# Patient Record
Sex: MALE | Race: WHITE | Employment: UNEMPLOYED | URBAN - METROPOLITAN AREA
[De-identification: names, ages, dates, MRNs, and addresses within clinical notes are randomized per-mention and may not be internally consistent; named-entity substitution may affect disease eponyms.]

---

## 2018-01-01 ENCOUNTER — APPOINTMENT (OUTPATIENT)
Dept: GENERAL RADIOLOGY | Age: 58
DRG: 870 | End: 2018-01-01
Attending: INTERNAL MEDICINE
Payer: MEDICARE

## 2018-01-01 ENCOUNTER — APPOINTMENT (OUTPATIENT)
Dept: GENERAL RADIOLOGY | Age: 58
DRG: 870 | End: 2018-01-01
Attending: HOSPITALIST
Payer: MEDICARE

## 2018-01-01 ENCOUNTER — APPOINTMENT (OUTPATIENT)
Dept: GENERAL RADIOLOGY | Age: 58
DRG: 870 | End: 2018-01-01
Attending: EMERGENCY MEDICINE
Payer: MEDICARE

## 2018-01-01 ENCOUNTER — HOSPITAL ENCOUNTER (INPATIENT)
Age: 58
LOS: 10 days | DRG: 870 | End: 2018-01-11
Attending: EMERGENCY MEDICINE | Admitting: INTERNAL MEDICINE
Payer: MEDICARE

## 2018-01-01 ENCOUNTER — APPOINTMENT (OUTPATIENT)
Dept: INTERVENTIONAL RADIOLOGY/VASCULAR | Age: 58
DRG: 870 | End: 2018-01-01
Attending: INTERNAL MEDICINE
Payer: MEDICARE

## 2018-01-01 VITALS
DIASTOLIC BLOOD PRESSURE: 57 MMHG | HEIGHT: 67 IN | WEIGHT: 224.87 LBS | TEMPERATURE: 97.6 F | BODY MASS INDEX: 35.29 KG/M2 | SYSTOLIC BLOOD PRESSURE: 99 MMHG
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
## 2018-01-01 DIAGNOSIS — R06.02 SOB (SHORTNESS OF BREATH): ICD-10-CM

## 2018-01-01 DIAGNOSIS — J18.9 HAP (HOSPITAL-ACQUIRED PNEUMONIA): ICD-10-CM

## 2018-01-01 DIAGNOSIS — J96.01 ACUTE RESPIRATORY FAILURE WITH HYPOXIA (HCC): Primary | ICD-10-CM

## 2018-01-01 DIAGNOSIS — R14.0 ABDOMINAL DISTENTION: ICD-10-CM

## 2018-01-01 DIAGNOSIS — N28.9 ACUTE RENAL INSUFFICIENCY: ICD-10-CM

## 2018-01-01 DIAGNOSIS — G93.40 ACUTE ENCEPHALOPATHY: ICD-10-CM

## 2018-01-01 DIAGNOSIS — I50.23 ACUTE ON CHRONIC SYSTOLIC HF (HEART FAILURE) (HCC): ICD-10-CM

## 2018-01-01 PROBLEM — J96.00 ACUTE RESPIRATORY FAILURE (HCC): Status: ACTIVE | Noted: 2018-01-01

## 2018-01-01 PROBLEM — I48.3 TYPICAL ATRIAL FLUTTER (HCC): Status: ACTIVE | Noted: 2018-01-01

## 2018-01-01 LAB
ALBUMIN SERPL-MCNC: 1.1 G/DL (ref 3.5–5)
ALBUMIN SERPL-MCNC: 1.2 G/DL (ref 3.5–5)
ALBUMIN SERPL-MCNC: 1.3 G/DL (ref 3.5–5)
ALBUMIN SERPL-MCNC: 1.4 G/DL (ref 3.5–5)
ALBUMIN SERPL-MCNC: 1.4 G/DL (ref 3.5–5)
ALBUMIN SERPL-MCNC: 1.5 G/DL (ref 3.5–5)
ALBUMIN SERPL-MCNC: 1.8 G/DL (ref 3.5–5)
ALBUMIN SERPL-MCNC: 2.1 G/DL (ref 3.5–5)
ALBUMIN SERPL-MCNC: 3.2 G/DL (ref 3.5–5)
ALBUMIN/GLOB SERPL: 0.3 {RATIO} (ref 1.1–2.2)
ALBUMIN/GLOB SERPL: 0.4 {RATIO} (ref 1.1–2.2)
ALBUMIN/GLOB SERPL: 0.5 {RATIO} (ref 1.1–2.2)
ALBUMIN/GLOB SERPL: 0.7 {RATIO} (ref 1.1–2.2)
ALP SERPL-CCNC: 114 U/L (ref 45–117)
ALP SERPL-CCNC: 43 U/L (ref 45–117)
ALP SERPL-CCNC: 50 U/L (ref 45–117)
ALP SERPL-CCNC: 56 U/L (ref 45–117)
ALP SERPL-CCNC: 59 U/L (ref 45–117)
ALP SERPL-CCNC: 69 U/L (ref 45–117)
ALP SERPL-CCNC: 70 U/L (ref 45–117)
ALP SERPL-CCNC: 88 U/L (ref 45–117)
ALP SERPL-CCNC: 94 U/L (ref 45–117)
ALP SERPL-CCNC: 99 U/L (ref 45–117)
ALT SERPL-CCNC: 25 U/L (ref 12–78)
ALT SERPL-CCNC: 25 U/L (ref 12–78)
ALT SERPL-CCNC: 29 U/L (ref 12–78)
ALT SERPL-CCNC: 29 U/L (ref 12–78)
ALT SERPL-CCNC: 34 U/L (ref 12–78)
ALT SERPL-CCNC: 40 U/L (ref 12–78)
ALT SERPL-CCNC: 41 U/L (ref 12–78)
ALT SERPL-CCNC: 44 U/L (ref 12–78)
ALT SERPL-CCNC: 48 U/L (ref 12–78)
ALT SERPL-CCNC: 62 U/L (ref 12–78)
AMORPH CRY URNS QL MICRO: ABNORMAL
ANION GAP SERPL CALC-SCNC: 10 MMOL/L (ref 5–15)
ANION GAP SERPL CALC-SCNC: 2 MMOL/L (ref 5–15)
ANION GAP SERPL CALC-SCNC: 3 MMOL/L (ref 5–15)
ANION GAP SERPL CALC-SCNC: 4 MMOL/L (ref 5–15)
ANION GAP SERPL CALC-SCNC: 5 MMOL/L (ref 5–15)
ANION GAP SERPL CALC-SCNC: 6 MMOL/L (ref 5–15)
ANION GAP SERPL CALC-SCNC: 7 MMOL/L (ref 5–15)
ANION GAP SERPL CALC-SCNC: 8 MMOL/L (ref 5–15)
ANION GAP SERPL CALC-SCNC: 9 MMOL/L (ref 5–15)
APPEARANCE UR: ABNORMAL
APTT PPP: 37.6 SEC (ref 22.1–32.5)
APTT PPP: 50.2 SEC (ref 22.1–32.5)
APTT PPP: 56 SEC (ref 22.1–32.5)
APTT PPP: 57.1 SEC (ref 22.1–32.5)
APTT PPP: 58.3 SEC (ref 22.1–32.5)
APTT PPP: 59.2 SEC (ref 22.1–32.5)
APTT PPP: 59.6 SEC (ref 22.1–32.5)
APTT PPP: 61.6 SEC (ref 22.1–32.5)
APTT PPP: 62.2 SEC (ref 22.1–32.5)
APTT PPP: 63 SEC (ref 22.1–32.5)
APTT PPP: >130 SEC (ref 22.1–32.5)
ARTERIAL PATENCY WRIST A: ABNORMAL
ARTERIAL PATENCY WRIST A: YES
AST SERPL-CCNC: 35 U/L (ref 15–37)
AST SERPL-CCNC: 40 U/L (ref 15–37)
AST SERPL-CCNC: 50 U/L (ref 15–37)
AST SERPL-CCNC: 52 U/L (ref 15–37)
AST SERPL-CCNC: 56 U/L (ref 15–37)
AST SERPL-CCNC: 60 U/L (ref 15–37)
AST SERPL-CCNC: 71 U/L (ref 15–37)
AST SERPL-CCNC: 92 U/L (ref 15–37)
ATRIAL RATE: 120 BPM
ATRIAL RATE: 150 BPM
ATRIAL RATE: 311 BPM
ATRIAL RATE: 96 BPM
BACTERIA SPEC CULT: ABNORMAL
BACTERIA SPEC CULT: ABNORMAL
BACTERIA SPEC CULT: NORMAL
BACTERIA SPEC CULT: NORMAL
BACTERIA URNS QL MICRO: NEGATIVE /HPF
BASE EXCESS BLDA CALC-SCNC: 1 MMOL/L
BASE EXCESS BLDA CALC-SCNC: 1 MMOL/L
BASE EXCESS BLDA CALC-SCNC: 1.5 MMOL/L
BASE EXCESS BLDA CALC-SCNC: 1.8 MMOL/L
BASE EXCESS BLDA CALC-SCNC: 10 MMOL/L
BASE EXCESS BLDA CALC-SCNC: 10 MMOL/L
BASE EXCESS BLDA CALC-SCNC: 11.9 MMOL/L
BASE EXCESS BLDA CALC-SCNC: 3.3 MMOL/L
BASE EXCESS BLDA CALC-SCNC: 3.3 MMOL/L
BASE EXCESS BLDA CALC-SCNC: 3.6 MMOL/L
BASE EXCESS BLDA CALC-SCNC: 3.6 MMOL/L
BASE EXCESS BLDA CALC-SCNC: 5.5 MMOL/L
BASE EXCESS BLDA CALC-SCNC: 6.2 MMOL/L
BASE EXCESS BLDA CALC-SCNC: 6.2 MMOL/L
BASE EXCESS BLDA CALC-SCNC: 7.7 MMOL/L
BASE EXCESS BLDA CALC-SCNC: 8 MMOL/L
BASOPHILS # BLD: 0 K/UL
BASOPHILS # BLD: 0 K/UL (ref 0–0.1)
BASOPHILS NFR BLD: 0 %
BASOPHILS NFR BLD: 0 % (ref 0–1)
BDY SITE: ABNORMAL
BILIRUB DIRECT SERPL-MCNC: 0.1 MG/DL (ref 0–0.2)
BILIRUB SERPL-MCNC: 0.4 MG/DL (ref 0.2–1)
BILIRUB SERPL-MCNC: 0.5 MG/DL (ref 0.2–1)
BILIRUB SERPL-MCNC: 0.9 MG/DL (ref 0.2–1)
BILIRUB UR QL: NEGATIVE
BNP SERPL-MCNC: 4443 PG/ML (ref 0–125)
BREATHS.SPONTANEOUS ON VENT: 30
BREATHS.SPONTANEOUS ON VENT: 30
BREATHS.SPONTANEOUS ON VENT: 32
BUN SERPL-MCNC: 100 MG/DL (ref 6–20)
BUN SERPL-MCNC: 105 MG/DL (ref 6–20)
BUN SERPL-MCNC: 123 MG/DL (ref 6–20)
BUN SERPL-MCNC: 124 MG/DL (ref 6–20)
BUN SERPL-MCNC: 22 MG/DL (ref 6–20)
BUN SERPL-MCNC: 43 MG/DL (ref 6–20)
BUN SERPL-MCNC: 43 MG/DL (ref 6–20)
BUN SERPL-MCNC: 47 MG/DL (ref 6–20)
BUN SERPL-MCNC: 49 MG/DL (ref 6–20)
BUN SERPL-MCNC: 52 MG/DL (ref 6–20)
BUN SERPL-MCNC: 54 MG/DL (ref 6–20)
BUN SERPL-MCNC: 55 MG/DL (ref 6–20)
BUN SERPL-MCNC: 64 MG/DL (ref 6–20)
BUN SERPL-MCNC: 72 MG/DL (ref 6–20)
BUN SERPL-MCNC: 75 MG/DL (ref 6–20)
BUN SERPL-MCNC: 82 MG/DL (ref 6–20)
BUN SERPL-MCNC: 89 MG/DL (ref 6–20)
BUN/CREAT SERPL: 21 (ref 12–20)
BUN/CREAT SERPL: 23 (ref 12–20)
BUN/CREAT SERPL: 25 (ref 12–20)
BUN/CREAT SERPL: 26 (ref 12–20)
BUN/CREAT SERPL: 27 (ref 12–20)
BUN/CREAT SERPL: 27 (ref 12–20)
BUN/CREAT SERPL: 28 (ref 12–20)
BUN/CREAT SERPL: 29 (ref 12–20)
BUN/CREAT SERPL: 29 (ref 12–20)
BUN/CREAT SERPL: 30 (ref 12–20)
BUN/CREAT SERPL: 36 (ref 12–20)
CALCIUM SERPL-MCNC: 7.4 MG/DL (ref 8.5–10.1)
CALCIUM SERPL-MCNC: 7.4 MG/DL (ref 8.5–10.1)
CALCIUM SERPL-MCNC: 7.7 MG/DL (ref 8.5–10.1)
CALCIUM SERPL-MCNC: 7.8 MG/DL (ref 8.5–10.1)
CALCIUM SERPL-MCNC: 7.9 MG/DL (ref 8.5–10.1)
CALCIUM SERPL-MCNC: 8 MG/DL (ref 8.5–10.1)
CALCIUM SERPL-MCNC: 8.1 MG/DL (ref 8.5–10.1)
CALCIUM SERPL-MCNC: 8.1 MG/DL (ref 8.5–10.1)
CALCIUM SERPL-MCNC: 8.2 MG/DL (ref 8.5–10.1)
CALCIUM SERPL-MCNC: 8.3 MG/DL (ref 8.5–10.1)
CALCIUM SERPL-MCNC: 8.4 MG/DL (ref 8.5–10.1)
CALCIUM SERPL-MCNC: 8.8 MG/DL (ref 8.5–10.1)
CALCULATED P AXIS, ECG09: 115 DEGREES
CALCULATED P AXIS, ECG09: 51 DEGREES
CALCULATED R AXIS, ECG10: 113 DEGREES
CALCULATED R AXIS, ECG10: 113 DEGREES
CALCULATED R AXIS, ECG10: 119 DEGREES
CALCULATED R AXIS, ECG10: 132 DEGREES
CALCULATED T AXIS, ECG11: -22 DEGREES
CALCULATED T AXIS, ECG11: -66 DEGREES
CALCULATED T AXIS, ECG11: -76 DEGREES
CALCULATED T AXIS, ECG11: -78 DEGREES
CHLORIDE SERPL-SCNC: 100 MMOL/L (ref 97–108)
CHLORIDE SERPL-SCNC: 93 MMOL/L (ref 97–108)
CHLORIDE SERPL-SCNC: 93 MMOL/L (ref 97–108)
CHLORIDE SERPL-SCNC: 94 MMOL/L (ref 97–108)
CHLORIDE SERPL-SCNC: 95 MMOL/L (ref 97–108)
CHLORIDE SERPL-SCNC: 95 MMOL/L (ref 97–108)
CHLORIDE SERPL-SCNC: 96 MMOL/L (ref 97–108)
CHLORIDE SERPL-SCNC: 97 MMOL/L (ref 97–108)
CHLORIDE SERPL-SCNC: 98 MMOL/L (ref 97–108)
CHLORIDE SERPL-SCNC: 99 MMOL/L (ref 97–108)
CK MB CFR SERPL CALC: 4.1 % (ref 0–2.5)
CK MB SERPL-MCNC: 1.4 NG/ML (ref 5–25)
CK SERPL-CCNC: 34 U/L (ref 39–308)
CK SERPL-CCNC: 40 U/L (ref 39–308)
CO2 SERPL-SCNC: 30 MMOL/L (ref 21–32)
CO2 SERPL-SCNC: 31 MMOL/L (ref 21–32)
CO2 SERPL-SCNC: 31 MMOL/L (ref 21–32)
CO2 SERPL-SCNC: 32 MMOL/L (ref 21–32)
CO2 SERPL-SCNC: 33 MMOL/L (ref 21–32)
CO2 SERPL-SCNC: 34 MMOL/L (ref 21–32)
CO2 SERPL-SCNC: 35 MMOL/L (ref 21–32)
CO2 SERPL-SCNC: 37 MMOL/L (ref 21–32)
CO2 SERPL-SCNC: 42 MMOL/L (ref 21–32)
COLOR UR: ABNORMAL
CREAT SERPL-MCNC: 1.07 MG/DL (ref 0.7–1.3)
CREAT SERPL-MCNC: 1.3 MG/DL (ref 0.7–1.3)
CREAT SERPL-MCNC: 1.42 MG/DL (ref 0.7–1.3)
CREAT SERPL-MCNC: 1.82 MG/DL (ref 0.7–1.3)
CREAT SERPL-MCNC: 1.89 MG/DL (ref 0.7–1.3)
CREAT SERPL-MCNC: 1.9 MG/DL (ref 0.7–1.3)
CREAT SERPL-MCNC: 2.14 MG/DL (ref 0.7–1.3)
CREAT SERPL-MCNC: 2.17 MG/DL (ref 0.7–1.3)
CREAT SERPL-MCNC: 2.44 MG/DL (ref 0.7–1.3)
CREAT SERPL-MCNC: 2.62 MG/DL (ref 0.7–1.3)
CREAT SERPL-MCNC: 2.72 MG/DL (ref 0.7–1.3)
CREAT SERPL-MCNC: 2.99 MG/DL (ref 0.7–1.3)
CREAT SERPL-MCNC: 3.56 MG/DL (ref 0.7–1.3)
CREAT SERPL-MCNC: 3.75 MG/DL (ref 0.7–1.3)
CREAT SERPL-MCNC: 3.8 MG/DL (ref 0.7–1.3)
CREAT SERPL-MCNC: 4.76 MG/DL (ref 0.7–1.3)
CREAT SERPL-MCNC: 4.81 MG/DL (ref 0.7–1.3)
DATE LAST DOSE: ABNORMAL
DIAGNOSIS, 93000: NORMAL
DIFFERENTIAL METHOD BLD: ABNORMAL
EOSINOPHIL # BLD: 0 K/UL
EOSINOPHIL # BLD: 0 K/UL (ref 0–0.4)
EOSINOPHIL # BLD: 0.2 K/UL
EOSINOPHIL NFR BLD: 0 %
EOSINOPHIL NFR BLD: 0 % (ref 0–7)
EOSINOPHIL NFR BLD: 1 %
EPAP/CPAP/PEEP, PAPEEP: 12
EPAP/CPAP/PEEP, PAPEEP: 12
EPAP/CPAP/PEEP, PAPEEP: 14
EPAP/CPAP/PEEP, PAPEEP: 18
EPAP/CPAP/PEEP, PAPEEP: 5
EPAP/CPAP/PEEP, PAPEEP: 6
EPAP/CPAP/PEEP, PAPEEP: 8
EPITH CASTS URNS QL MICRO: ABNORMAL /LPF
ERYTHROCYTE [DISTWIDTH] IN BLOOD BY AUTOMATED COUNT: 14.4 % (ref 11.5–14.5)
ERYTHROCYTE [DISTWIDTH] IN BLOOD BY AUTOMATED COUNT: 15 % (ref 11.5–14.5)
ERYTHROCYTE [DISTWIDTH] IN BLOOD BY AUTOMATED COUNT: 15.3 % (ref 11.5–14.5)
ERYTHROCYTE [DISTWIDTH] IN BLOOD BY AUTOMATED COUNT: 15.5 % (ref 11.5–14.5)
ERYTHROCYTE [DISTWIDTH] IN BLOOD BY AUTOMATED COUNT: 15.6 % (ref 11.5–14.5)
ERYTHROCYTE [DISTWIDTH] IN BLOOD BY AUTOMATED COUNT: 15.6 % (ref 11.5–14.5)
ERYTHROCYTE [DISTWIDTH] IN BLOOD BY AUTOMATED COUNT: 15.8 % (ref 11.5–14.5)
ERYTHROCYTE [DISTWIDTH] IN BLOOD BY AUTOMATED COUNT: 16 % (ref 11.5–14.5)
EST. AVERAGE GLUCOSE BLD GHB EST-MCNC: 212 MG/DL
FIBRINOGEN PPP-MCNC: 633 MG/DL (ref 200–475)
FIO2 ON VENT: 100 %
FIO2 ON VENT: 90 %
FIO2 ON VENT: 90 %
GAS FLOW.O2 O2 DELIVERY SYS: 60 L/MIN
GAS FLOW.O2 SETTING OXYMISER: 16 L/MIN
GAS FLOW.O2 SETTING OXYMISER: 24 L/MIN
GAS FLOW.O2 SETTING OXYMISER: 30 L/MIN
GAS FLOW.O2 SETTING OXYMISER: 4 L/MIN
GLOBULIN SER CALC-MCNC: 3.2 G/DL (ref 2–4)
GLOBULIN SER CALC-MCNC: 3.4 G/DL (ref 2–4)
GLOBULIN SER CALC-MCNC: 3.5 G/DL (ref 2–4)
GLOBULIN SER CALC-MCNC: 3.6 G/DL (ref 2–4)
GLOBULIN SER CALC-MCNC: 3.6 G/DL (ref 2–4)
GLOBULIN SER CALC-MCNC: 3.8 G/DL (ref 2–4)
GLOBULIN SER CALC-MCNC: 3.8 G/DL (ref 2–4)
GLOBULIN SER CALC-MCNC: 3.9 G/DL (ref 2–4)
GLOBULIN SER CALC-MCNC: 4 G/DL (ref 2–4)
GLOBULIN SER CALC-MCNC: 4.7 G/DL (ref 2–4)
GLUCOSE BLD STRIP.AUTO-MCNC: 100 MG/DL (ref 65–100)
GLUCOSE BLD STRIP.AUTO-MCNC: 100 MG/DL (ref 65–100)
GLUCOSE BLD STRIP.AUTO-MCNC: 103 MG/DL (ref 65–100)
GLUCOSE BLD STRIP.AUTO-MCNC: 118 MG/DL (ref 65–100)
GLUCOSE BLD STRIP.AUTO-MCNC: 122 MG/DL (ref 65–100)
GLUCOSE BLD STRIP.AUTO-MCNC: 133 MG/DL (ref 65–100)
GLUCOSE BLD STRIP.AUTO-MCNC: 134 MG/DL (ref 65–100)
GLUCOSE BLD STRIP.AUTO-MCNC: 139 MG/DL (ref 65–100)
GLUCOSE BLD STRIP.AUTO-MCNC: 141 MG/DL (ref 65–100)
GLUCOSE BLD STRIP.AUTO-MCNC: 142 MG/DL (ref 65–100)
GLUCOSE BLD STRIP.AUTO-MCNC: 147 MG/DL (ref 65–100)
GLUCOSE BLD STRIP.AUTO-MCNC: 152 MG/DL (ref 65–100)
GLUCOSE BLD STRIP.AUTO-MCNC: 152 MG/DL (ref 65–100)
GLUCOSE BLD STRIP.AUTO-MCNC: 153 MG/DL (ref 65–100)
GLUCOSE BLD STRIP.AUTO-MCNC: 161 MG/DL (ref 65–100)
GLUCOSE BLD STRIP.AUTO-MCNC: 176 MG/DL (ref 65–100)
GLUCOSE BLD STRIP.AUTO-MCNC: 179 MG/DL (ref 65–100)
GLUCOSE BLD STRIP.AUTO-MCNC: 195 MG/DL (ref 65–100)
GLUCOSE BLD STRIP.AUTO-MCNC: 195 MG/DL (ref 65–100)
GLUCOSE BLD STRIP.AUTO-MCNC: 198 MG/DL (ref 65–100)
GLUCOSE BLD STRIP.AUTO-MCNC: 198 MG/DL (ref 65–100)
GLUCOSE BLD STRIP.AUTO-MCNC: 217 MG/DL (ref 65–100)
GLUCOSE BLD STRIP.AUTO-MCNC: 220 MG/DL (ref 65–100)
GLUCOSE BLD STRIP.AUTO-MCNC: 231 MG/DL (ref 65–100)
GLUCOSE BLD STRIP.AUTO-MCNC: 233 MG/DL (ref 65–100)
GLUCOSE BLD STRIP.AUTO-MCNC: 237 MG/DL (ref 65–100)
GLUCOSE BLD STRIP.AUTO-MCNC: 238 MG/DL (ref 65–100)
GLUCOSE BLD STRIP.AUTO-MCNC: 247 MG/DL (ref 65–100)
GLUCOSE BLD STRIP.AUTO-MCNC: 247 MG/DL (ref 65–100)
GLUCOSE BLD STRIP.AUTO-MCNC: 252 MG/DL (ref 65–100)
GLUCOSE BLD STRIP.AUTO-MCNC: 262 MG/DL (ref 65–100)
GLUCOSE BLD STRIP.AUTO-MCNC: 270 MG/DL (ref 65–100)
GLUCOSE BLD STRIP.AUTO-MCNC: 278 MG/DL (ref 65–100)
GLUCOSE BLD STRIP.AUTO-MCNC: 285 MG/DL (ref 65–100)
GLUCOSE BLD STRIP.AUTO-MCNC: 294 MG/DL (ref 65–100)
GLUCOSE BLD STRIP.AUTO-MCNC: 298 MG/DL (ref 65–100)
GLUCOSE BLD STRIP.AUTO-MCNC: 308 MG/DL (ref 65–100)
GLUCOSE BLD STRIP.AUTO-MCNC: 312 MG/DL (ref 65–100)
GLUCOSE BLD STRIP.AUTO-MCNC: 35 MG/DL (ref 65–100)
GLUCOSE BLD STRIP.AUTO-MCNC: 36 MG/DL (ref 65–100)
GLUCOSE BLD STRIP.AUTO-MCNC: 47 MG/DL (ref 65–100)
GLUCOSE BLD STRIP.AUTO-MCNC: 49 MG/DL (ref 65–100)
GLUCOSE BLD STRIP.AUTO-MCNC: 78 MG/DL (ref 65–100)
GLUCOSE BLD STRIP.AUTO-MCNC: 83 MG/DL (ref 65–100)
GLUCOSE BLD STRIP.AUTO-MCNC: 86 MG/DL (ref 65–100)
GLUCOSE BLD STRIP.AUTO-MCNC: 89 MG/DL (ref 65–100)
GLUCOSE BLD STRIP.AUTO-MCNC: 97 MG/DL (ref 65–100)
GLUCOSE SERPL-MCNC: 102 MG/DL (ref 65–100)
GLUCOSE SERPL-MCNC: 138 MG/DL (ref 65–100)
GLUCOSE SERPL-MCNC: 142 MG/DL (ref 65–100)
GLUCOSE SERPL-MCNC: 165 MG/DL (ref 65–100)
GLUCOSE SERPL-MCNC: 187 MG/DL (ref 65–100)
GLUCOSE SERPL-MCNC: 189 MG/DL (ref 65–100)
GLUCOSE SERPL-MCNC: 190 MG/DL (ref 65–100)
GLUCOSE SERPL-MCNC: 192 MG/DL (ref 65–100)
GLUCOSE SERPL-MCNC: 238 MG/DL (ref 65–100)
GLUCOSE SERPL-MCNC: 243 MG/DL (ref 65–100)
GLUCOSE SERPL-MCNC: 251 MG/DL (ref 65–100)
GLUCOSE SERPL-MCNC: 259 MG/DL (ref 65–100)
GLUCOSE SERPL-MCNC: 269 MG/DL (ref 65–100)
GLUCOSE SERPL-MCNC: 278 MG/DL (ref 65–100)
GLUCOSE SERPL-MCNC: 290 MG/DL (ref 65–100)
GLUCOSE SERPL-MCNC: 331 MG/DL (ref 65–100)
GLUCOSE SERPL-MCNC: 83 MG/DL (ref 65–100)
GLUCOSE UR STRIP.AUTO-MCNC: NEGATIVE MG/DL
GRAM STN SPEC: ABNORMAL
GRAM STN SPEC: ABNORMAL
GRAM STN SPEC: NORMAL
HBA1C MFR BLD: 9 % (ref 4.2–6.3)
HBV SURFACE AB SER QL: NONREACTIVE
HBV SURFACE AB SER-ACNC: 6.65 MIU/ML
HBV SURFACE AG SER QL: <0.1 INDEX
HBV SURFACE AG SER QL: NEGATIVE
HCO3 BLDA-SCNC: 31 MMOL/L (ref 22–26)
HCO3 BLDA-SCNC: 31 MMOL/L (ref 22–26)
HCO3 BLDA-SCNC: 32 MMOL/L (ref 22–26)
HCO3 BLDA-SCNC: 34 MMOL/L (ref 22–26)
HCO3 BLDA-SCNC: 35 MMOL/L (ref 22–26)
HCO3 BLDA-SCNC: 36 MMOL/L (ref 22–26)
HCO3 BLDA-SCNC: 37 MMOL/L (ref 22–26)
HCO3 BLDA-SCNC: 38 MMOL/L (ref 22–26)
HCO3 BLDA-SCNC: 38 MMOL/L (ref 22–26)
HCO3 BLDA-SCNC: 39 MMOL/L (ref 22–26)
HCO3 BLDA-SCNC: 44 MMOL/L (ref 22–26)
HCO3 BLDA-SCNC: 45 MMOL/L (ref 22–26)
HCT VFR BLD AUTO: 36.2 % (ref 36.6–50.3)
HCT VFR BLD AUTO: 39.6 % (ref 36.6–50.3)
HCT VFR BLD AUTO: 39.8 % (ref 36.6–50.3)
HCT VFR BLD AUTO: 40.8 % (ref 36.6–50.3)
HCT VFR BLD AUTO: 41.4 % (ref 36.6–50.3)
HCT VFR BLD AUTO: 43 % (ref 36.6–50.3)
HCT VFR BLD AUTO: 43.2 % (ref 36.6–50.3)
HCT VFR BLD AUTO: 43.5 % (ref 36.6–50.3)
HCT VFR BLD AUTO: 48.6 % (ref 36.6–50.3)
HCT VFR BLD AUTO: 55 % (ref 36.6–50.3)
HGB BLD-MCNC: 11.6 G/DL (ref 12.1–17)
HGB BLD-MCNC: 12.5 G/DL (ref 12.1–17)
HGB BLD-MCNC: 12.6 G/DL (ref 12.1–17)
HGB BLD-MCNC: 12.7 G/DL (ref 12.1–17)
HGB BLD-MCNC: 13.3 G/DL (ref 12.1–17)
HGB BLD-MCNC: 13.7 G/DL (ref 12.1–17)
HGB BLD-MCNC: 14 G/DL (ref 12.1–17)
HGB BLD-MCNC: 14.1 G/DL (ref 12.1–17)
HGB BLD-MCNC: 14.9 G/DL (ref 12.1–17)
HGB BLD-MCNC: 18.4 G/DL (ref 12.1–17)
HGB UR QL STRIP: ABNORMAL
INR PPP: 1.2 (ref 0.9–1.1)
IPAP/PIP, IPAPIP: 14
IPAP/PIP, IPAPIP: 16
IPAP/PIP, IPAPIP: 25
IPAP/PIP, IPAPIP: 26
IPAP/PIP, IPAPIP: 34
KETONES UR QL STRIP.AUTO: NEGATIVE MG/DL
KOH PREP SPEC: NORMAL
LACTATE SERPL-SCNC: 0.8 MMOL/L (ref 0.4–2)
LACTATE SERPL-SCNC: 2.5 MMOL/L (ref 0.4–2)
LEUKOCYTE ESTERASE UR QL STRIP.AUTO: ABNORMAL
LYMPHOCYTES # BLD: 0 K/UL
LYMPHOCYTES # BLD: 0.2 K/UL (ref 0.8–3.5)
LYMPHOCYTES # BLD: 0.3 K/UL
LYMPHOCYTES # BLD: 0.3 K/UL (ref 0.8–3.5)
LYMPHOCYTES # BLD: 0.5 K/UL (ref 0.8–3.5)
LYMPHOCYTES # BLD: 1.3 K/UL
LYMPHOCYTES # BLD: 1.6 K/UL
LYMPHOCYTES NFR BLD: 0 %
LYMPHOCYTES NFR BLD: 1 %
LYMPHOCYTES NFR BLD: 1 % (ref 12–49)
LYMPHOCYTES NFR BLD: 1 % (ref 12–49)
LYMPHOCYTES NFR BLD: 2 % (ref 12–49)
LYMPHOCYTES NFR BLD: 4 %
LYMPHOCYTES NFR BLD: 5 %
MAGNESIUM SERPL-MCNC: 2 MG/DL (ref 1.6–2.4)
MAGNESIUM SERPL-MCNC: 2.1 MG/DL (ref 1.6–2.4)
MAGNESIUM SERPL-MCNC: 2.3 MG/DL (ref 1.6–2.4)
MAGNESIUM SERPL-MCNC: 2.3 MG/DL (ref 1.6–2.4)
MAGNESIUM SERPL-MCNC: 2.4 MG/DL (ref 1.6–2.4)
MAGNESIUM SERPL-MCNC: 2.5 MG/DL (ref 1.6–2.4)
MAGNESIUM SERPL-MCNC: 2.5 MG/DL (ref 1.6–2.4)
MCH RBC QN AUTO: 28.7 PG (ref 26–34)
MCH RBC QN AUTO: 28.9 PG (ref 26–34)
MCH RBC QN AUTO: 28.9 PG (ref 26–34)
MCH RBC QN AUTO: 29.3 PG (ref 26–34)
MCH RBC QN AUTO: 29.4 PG (ref 26–34)
MCH RBC QN AUTO: 29.4 PG (ref 26–34)
MCH RBC QN AUTO: 29.5 PG (ref 26–34)
MCH RBC QN AUTO: 29.9 PG (ref 26–34)
MCH RBC QN AUTO: 30.4 PG (ref 26–34)
MCH RBC QN AUTO: 31.7 PG (ref 26–34)
MCHC RBC AUTO-ENTMCNC: 30.7 G/DL (ref 30–36.5)
MCHC RBC AUTO-ENTMCNC: 30.9 G/DL (ref 30–36.5)
MCHC RBC AUTO-ENTMCNC: 30.9 G/DL (ref 30–36.5)
MCHC RBC AUTO-ENTMCNC: 31.6 G/DL (ref 30–36.5)
MCHC RBC AUTO-ENTMCNC: 31.9 G/DL (ref 30–36.5)
MCHC RBC AUTO-ENTMCNC: 32 G/DL (ref 30–36.5)
MCHC RBC AUTO-ENTMCNC: 32.2 G/DL (ref 30–36.5)
MCHC RBC AUTO-ENTMCNC: 32.6 G/DL (ref 30–36.5)
MCHC RBC AUTO-ENTMCNC: 33.1 G/DL (ref 30–36.5)
MCHC RBC AUTO-ENTMCNC: 33.5 G/DL (ref 30–36.5)
MCV RBC AUTO: 89.6 FL (ref 80–99)
MCV RBC AUTO: 89.7 FL (ref 80–99)
MCV RBC AUTO: 90.5 FL (ref 80–99)
MCV RBC AUTO: 91.5 FL (ref 80–99)
MCV RBC AUTO: 91.8 FL (ref 80–99)
MCV RBC AUTO: 93.2 FL (ref 80–99)
MCV RBC AUTO: 94.7 FL (ref 80–99)
MCV RBC AUTO: 94.7 FL (ref 80–99)
MCV RBC AUTO: 95.3 FL (ref 80–99)
MCV RBC AUTO: 96.2 FL (ref 80–99)
MONOCYTES # BLD: 0 K/UL
MONOCYTES # BLD: 0.4 K/UL (ref 0–1)
MONOCYTES # BLD: 0.5 K/UL (ref 0–1)
MONOCYTES # BLD: 0.6 K/UL
MONOCYTES # BLD: 0.6 K/UL (ref 0–1)
MONOCYTES # BLD: 1.3 K/UL
MONOCYTES # BLD: 1.6 K/UL
MONOCYTES NFR BLD: 0 %
MONOCYTES NFR BLD: 2 %
MONOCYTES NFR BLD: 2 % (ref 5–13)
MONOCYTES NFR BLD: 4 %
MONOCYTES NFR BLD: 5 %
MYELOCYTES NFR BLD MANUAL: 1 %
NEUTS BAND NFR BLD MANUAL: 1 %
NEUTS BAND NFR BLD MANUAL: 3 %
NEUTS BAND NFR BLD MANUAL: 6 %
NEUTS BAND NFR BLD MANUAL: 6 %
NEUTS SEG # BLD: 18.5 K/UL (ref 1.8–8)
NEUTS SEG # BLD: 22.5 K/UL (ref 1.8–8)
NEUTS SEG # BLD: 24 K/UL
NEUTS SEG # BLD: 27.8 K/UL
NEUTS SEG # BLD: 28.4 K/UL (ref 1.8–8)
NEUTS SEG # BLD: 28.7 K/UL
NEUTS SEG # BLD: 29.8 K/UL
NEUTS SEG NFR BLD: 86 %
NEUTS SEG NFR BLD: 88 %
NEUTS SEG NFR BLD: 93 %
NEUTS SEG NFR BLD: 94 %
NEUTS SEG NFR BLD: 96 % (ref 32–75)
NEUTS SEG NFR BLD: 97 % (ref 32–75)
NEUTS SEG NFR BLD: 97 % (ref 32–75)
NITRITE UR QL STRIP.AUTO: NEGATIVE
NRBC # BLD: 0 K/UL (ref 0–0.01)
NRBC # BLD: 0.06 K/UL (ref 0–0.01)
NRBC BLD-RTO: 0 PER 100 WBC
NRBC BLD-RTO: 0.2 PER 100 WBC
P-R INTERVAL, ECG05: 120 MS
P-R INTERVAL, ECG05: 148 MS
P-R INTERVAL, ECG05: 176 MS
PCO2 BLDA: 102 MMHG (ref 35–45)
PCO2 BLDA: 111 MMHG (ref 35–45)
PCO2 BLDA: 118 MMHG (ref 35–45)
PCO2 BLDA: 127 MMHG (ref 35–45)
PCO2 BLDA: 135 MMHG (ref 35–45)
PCO2 BLDA: 60 MMHG (ref 35–45)
PCO2 BLDA: 63 MMHG (ref 35–45)
PCO2 BLDA: 64 MMHG (ref 35–45)
PCO2 BLDA: 65 MMHG (ref 35–45)
PCO2 BLDA: 71 MMHG (ref 35–45)
PCO2 BLDA: 72 MMHG (ref 35–45)
PCO2 BLDA: 75 MMHG (ref 35–45)
PCO2 BLDA: 76 MMHG (ref 35–45)
PCO2 BLDA: 79 MMHG (ref 35–45)
PCO2 BLDA: 82 MMHG (ref 35–45)
PCO2 BLDA: 84 MMHG (ref 35–45)
PF4 HEPARIN CMPLX AB SER-ACNC: 0.32 OD (ref 0–0.4)
PH BLDA: 7.07 [PH] (ref 7.35–7.45)
PH BLDA: 7.09 [PH] (ref 7.35–7.45)
PH BLDA: 7.14 [PH] (ref 7.35–7.45)
PH BLDA: 7.19 [PH] (ref 7.35–7.45)
PH BLDA: 7.23 [PH] (ref 7.35–7.45)
PH BLDA: 7.24 [PH] (ref 7.35–7.45)
PH BLDA: 7.25 [PH] (ref 7.35–7.45)
PH BLDA: 7.26 [PH] (ref 7.35–7.45)
PH BLDA: 7.29 [PH] (ref 7.35–7.45)
PH BLDA: 7.32 [PH] (ref 7.35–7.45)
PH BLDA: 7.34 [PH] (ref 7.35–7.45)
PH BLDA: 7.34 [PH] (ref 7.35–7.45)
PH BLDA: 7.38 [PH] (ref 7.35–7.45)
PH BLDA: 7.39 [PH] (ref 7.35–7.45)
PH UR STRIP: 5 [PH] (ref 5–8)
PHOSPHATE SERPL-MCNC: 2.2 MG/DL (ref 2.6–4.7)
PHOSPHATE SERPL-MCNC: 3.1 MG/DL (ref 2.6–4.7)
PHOSPHATE SERPL-MCNC: 3.2 MG/DL (ref 2.6–4.7)
PHOSPHATE SERPL-MCNC: 3.7 MG/DL (ref 2.6–4.7)
PHOSPHATE SERPL-MCNC: 4 MG/DL (ref 2.6–4.7)
PHOSPHATE SERPL-MCNC: 4.2 MG/DL (ref 2.6–4.7)
PHOSPHATE SERPL-MCNC: 4.5 MG/DL (ref 2.6–4.7)
PHOSPHATE SERPL-MCNC: 4.7 MG/DL (ref 2.6–4.7)
PHOSPHATE SERPL-MCNC: 5 MG/DL (ref 2.6–4.7)
PHOSPHATE SERPL-MCNC: 5.6 MG/DL (ref 2.6–4.7)
PHOSPHATE SERPL-MCNC: 5.7 MG/DL (ref 2.6–4.7)
PHOSPHATE SERPL-MCNC: 6.5 MG/DL (ref 2.6–4.7)
PHOSPHATE SERPL-MCNC: 6.9 MG/DL (ref 2.6–4.7)
PHOSPHATE SERPL-MCNC: 7.1 MG/DL (ref 2.6–4.7)
PHOSPHATE SERPL-MCNC: 7.8 MG/DL (ref 2.6–4.7)
PHOSPHATE SERPL-MCNC: 7.9 MG/DL (ref 2.6–4.7)
PLATELET # BLD AUTO: 115 K/UL (ref 150–400)
PLATELET # BLD AUTO: 134 K/UL (ref 150–400)
PLATELET # BLD AUTO: 239 K/UL (ref 150–400)
PLATELET # BLD AUTO: 57 K/UL (ref 150–400)
PLATELET # BLD AUTO: 71 K/UL (ref 150–400)
PLATELET # BLD AUTO: 83 K/UL (ref 150–400)
PLATELET # BLD AUTO: 83 K/UL (ref 150–400)
PLATELET # BLD AUTO: 84 K/UL (ref 150–400)
PLATELET # BLD AUTO: 87 K/UL (ref 150–400)
PLATELET # BLD AUTO: 94 K/UL (ref 150–400)
PO2 BLDA: 36 MMHG (ref 80–100)
PO2 BLDA: 41 MMHG (ref 80–100)
PO2 BLDA: 44 MMHG (ref 80–100)
PO2 BLDA: 49 MMHG (ref 80–100)
PO2 BLDA: 50 MMHG (ref 80–100)
PO2 BLDA: 59 MMHG (ref 80–100)
PO2 BLDA: 60 MMHG (ref 80–100)
PO2 BLDA: 61 MMHG (ref 80–100)
PO2 BLDA: 64 MMHG (ref 80–100)
PO2 BLDA: 64 MMHG (ref 80–100)
PO2 BLDA: 70 MMHG (ref 80–100)
PO2 BLDA: 71 MMHG (ref 80–100)
PO2 BLDA: 71 MMHG (ref 80–100)
PO2 BLDA: 83 MMHG (ref 80–100)
PO2 BLDA: 83 MMHG (ref 80–100)
PO2 BLDA: 84 MMHG (ref 80–100)
POTASSIUM SERPL-SCNC: 3.8 MMOL/L (ref 3.5–5.1)
POTASSIUM SERPL-SCNC: 4.1 MMOL/L (ref 3.5–5.1)
POTASSIUM SERPL-SCNC: 4.2 MMOL/L (ref 3.5–5.1)
POTASSIUM SERPL-SCNC: 4.4 MMOL/L (ref 3.5–5.1)
POTASSIUM SERPL-SCNC: 4.5 MMOL/L (ref 3.5–5.1)
POTASSIUM SERPL-SCNC: 4.6 MMOL/L (ref 3.5–5.1)
POTASSIUM SERPL-SCNC: 4.6 MMOL/L (ref 3.5–5.1)
POTASSIUM SERPL-SCNC: 4.7 MMOL/L (ref 3.5–5.1)
POTASSIUM SERPL-SCNC: 5 MMOL/L (ref 3.5–5.1)
POTASSIUM SERPL-SCNC: 5.2 MMOL/L (ref 3.5–5.1)
POTASSIUM SERPL-SCNC: 5.4 MMOL/L (ref 3.5–5.1)
POTASSIUM SERPL-SCNC: 5.4 MMOL/L (ref 3.5–5.1)
POTASSIUM SERPL-SCNC: 5.5 MMOL/L (ref 3.5–5.1)
POTASSIUM SERPL-SCNC: 5.6 MMOL/L (ref 3.5–5.1)
POTASSIUM SERPL-SCNC: 5.7 MMOL/L (ref 3.5–5.1)
PRESSURE SUPPORT SETTING VENT: 16 CM[H2O]
PROCALCITONIN SERPL-MCNC: 2.77 NG/ML (ref 0–0.08)
PROT SERPL-MCNC: 4.7 G/DL (ref 6.4–8.2)
PROT SERPL-MCNC: 4.8 G/DL (ref 6.4–8.2)
PROT SERPL-MCNC: 5 G/DL (ref 6.4–8.2)
PROT SERPL-MCNC: 5 G/DL (ref 6.4–8.2)
PROT SERPL-MCNC: 5.2 G/DL (ref 6.4–8.2)
PROT SERPL-MCNC: 5.3 G/DL (ref 6.4–8.2)
PROT SERPL-MCNC: 6 G/DL (ref 6.4–8.2)
PROT SERPL-MCNC: 7.9 G/DL (ref 6.4–8.2)
PROT UR STRIP-MCNC: 30 MG/DL
PROTHROMBIN TIME: 12 SEC (ref 9–11.1)
Q-T INTERVAL, ECG07: 304 MS
Q-T INTERVAL, ECG07: 310 MS
Q-T INTERVAL, ECG07: 348 MS
Q-T INTERVAL, ECG07: 366 MS
QRS DURATION, ECG06: 122 MS
QRS DURATION, ECG06: 128 MS
QRS DURATION, ECG06: 128 MS
QRS DURATION, ECG06: 138 MS
QTC CALCULATION (BEZET), ECG08: 419 MS
QTC CALCULATION (BEZET), ECG08: 462 MS
QTC CALCULATION (BEZET), ECG08: 480 MS
QTC CALCULATION (BEZET), ECG08: 502 MS
RBC # BLD AUTO: 4.04 M/UL (ref 4.1–5.7)
RBC # BLD AUTO: 4.25 M/UL (ref 4.1–5.7)
RBC # BLD AUTO: 4.28 M/UL (ref 4.1–5.7)
RBC # BLD AUTO: 4.4 M/UL (ref 4.1–5.7)
RBC # BLD AUTO: 4.51 M/UL (ref 4.1–5.7)
RBC # BLD AUTO: 4.54 M/UL (ref 4.1–5.7)
RBC # BLD AUTO: 4.72 M/UL (ref 4.1–5.7)
RBC # BLD AUTO: 4.85 M/UL (ref 4.1–5.7)
RBC # BLD AUTO: 5.05 M/UL (ref 4.1–5.7)
RBC # BLD AUTO: 5.81 M/UL (ref 4.1–5.7)
RBC #/AREA URNS HPF: ABNORMAL /HPF (ref 0–5)
RBC MORPH BLD: ABNORMAL
REPORTED DOSE,DOSE: ABNORMAL UNITS
REPORTED DOSE/TIME,TMG: 2315
SAO2 % BLD: 60 % (ref 92–97)
SAO2 % BLD: 66 % (ref 92–97)
SAO2 % BLD: 77 % (ref 92–97)
SAO2 % BLD: 78 % (ref 92–97)
SAO2 % BLD: 81 % (ref 92–97)
SAO2 % BLD: 84 % (ref 92–97)
SAO2 % BLD: 85 % (ref 92–97)
SAO2 % BLD: 87 % (ref 92–97)
SAO2 % BLD: 87 % (ref 92–97)
SAO2 % BLD: 88 % (ref 92–97)
SAO2 % BLD: 90 % (ref 92–97)
SAO2 % BLD: 90 % (ref 92–97)
SAO2 % BLD: 91 % (ref 92–97)
SAO2 % BLD: 91 % (ref 92–97)
SAO2 % BLD: 92 % (ref 92–97)
SAO2 % BLD: 95 % (ref 92–97)
SAO2% DEVICE SAO2% SENSOR NAME: ABNORMAL
SERVICE CMNT-IMP: ABNORMAL
SERVICE CMNT-IMP: NORMAL
SODIUM SERPL-SCNC: 135 MMOL/L (ref 136–145)
SODIUM SERPL-SCNC: 135 MMOL/L (ref 136–145)
SODIUM SERPL-SCNC: 136 MMOL/L (ref 136–145)
SODIUM SERPL-SCNC: 137 MMOL/L (ref 136–145)
SODIUM SERPL-SCNC: 138 MMOL/L (ref 136–145)
SODIUM SERPL-SCNC: 139 MMOL/L (ref 136–145)
SODIUM SERPL-SCNC: 141 MMOL/L (ref 136–145)
SP GR UR REFRACTOMETRY: 1.02 (ref 1–1.03)
SPECIMEN SITE: ABNORMAL
THERAPEUTIC RANGE,PTTT: ABNORMAL SECS (ref 58–77)
TRIGL SERPL-MCNC: 613 MG/DL (ref ?–150)
TRIGL SERPL-MCNC: 779 MG/DL (ref ?–150)
TROPONIN I SERPL-MCNC: 0.05 NG/ML
TROPONIN I SERPL-MCNC: 0.1 NG/ML
UROBILINOGEN UR QL STRIP.AUTO: 0.2 EU/DL (ref 0.2–1)
VANCOMYCIN SERPL-MCNC: 28.4 UG/ML
VANCOMYCIN SERPL-MCNC: 7.5 UG/ML
VANCOMYCIN TROUGH SERPL-MCNC: 27.9 UG/ML (ref 5–10)
VENTILATION MODE VENT: ABNORMAL
VENTRICULAR RATE, ECG03: 110 BPM
VENTRICULAR RATE, ECG03: 125 BPM
VENTRICULAR RATE, ECG03: 150 BPM
VENTRICULAR RATE, ECG03: 96 BPM
VT SETTING VENT: 250 ML
VT SETTING VENT: 270 ML
VT SETTING VENT: 400 ML
VT SETTING VENT: 500 ML
WBC # BLD AUTO: 19.1 K/UL (ref 4.1–11.1)
WBC # BLD AUTO: 19.8 K/UL (ref 4.1–11.1)
WBC # BLD AUTO: 20.7 K/UL (ref 4.1–11.1)
WBC # BLD AUTO: 23.5 K/UL (ref 4.1–11.1)
WBC # BLD AUTO: 24.2 K/UL (ref 4.1–11.1)
WBC # BLD AUTO: 26 K/UL (ref 4.1–11.1)
WBC # BLD AUTO: 28.7 K/UL (ref 4.1–11.1)
WBC # BLD AUTO: 29.3 K/UL (ref 4.1–11.1)
WBC # BLD AUTO: 32.3 K/UL (ref 4.1–11.1)
WBC # BLD AUTO: 32.4 K/UL (ref 4.1–11.1)
WBC MORPH BLD: ABNORMAL
WBC NRBC COR # BLD: ABNORMAL 10*3/UL
WBC URNS QL MICRO: ABNORMAL /HPF (ref 0–4)

## 2018-01-01 PROCEDURE — 76450000000

## 2018-01-01 PROCEDURE — 84100 ASSAY OF PHOSPHORUS: CPT | Performed by: INTERNAL MEDICINE

## 2018-01-01 PROCEDURE — 86706 HEP B SURFACE ANTIBODY: CPT | Performed by: INTERNAL MEDICINE

## 2018-01-01 PROCEDURE — 94640 AIRWAY INHALATION TREATMENT: CPT

## 2018-01-01 PROCEDURE — C1751 CATH, INF, PER/CENT/MIDLINE: HCPCS

## 2018-01-01 PROCEDURE — 82962 GLUCOSE BLOOD TEST: CPT

## 2018-01-01 PROCEDURE — 36415 COLL VENOUS BLD VENIPUNCTURE: CPT | Performed by: INTERNAL MEDICINE

## 2018-01-01 PROCEDURE — 65610000006 HC RM INTENSIVE CARE

## 2018-01-01 PROCEDURE — 77030037878 HC DRSG MEPILEX >48IN BORD MOLN -B

## 2018-01-01 PROCEDURE — 85730 THROMBOPLASTIN TIME PARTIAL: CPT | Performed by: INTERNAL MEDICINE

## 2018-01-01 PROCEDURE — 74011250636 HC RX REV CODE- 250/636: Performed by: INTERNAL MEDICINE

## 2018-01-01 PROCEDURE — 80202 ASSAY OF VANCOMYCIN: CPT | Performed by: INTERNAL MEDICINE

## 2018-01-01 PROCEDURE — 80069 RENAL FUNCTION PANEL: CPT | Performed by: INTERNAL MEDICINE

## 2018-01-01 PROCEDURE — 77030018798 HC PMP KT ENTRL FED COVD -A

## 2018-01-01 PROCEDURE — 74011000258 HC RX REV CODE- 258: Performed by: INTERNAL MEDICINE

## 2018-01-01 PROCEDURE — 36600 WITHDRAWAL OF ARTERIAL BLOOD: CPT | Performed by: INTERNAL MEDICINE

## 2018-01-01 PROCEDURE — 80053 COMPREHEN METABOLIC PANEL: CPT | Performed by: INTERNAL MEDICINE

## 2018-01-01 PROCEDURE — 83735 ASSAY OF MAGNESIUM: CPT | Performed by: INTERNAL MEDICINE

## 2018-01-01 PROCEDURE — 87070 CULTURE OTHR SPECIMN AEROBIC: CPT | Performed by: INTERNAL MEDICINE

## 2018-01-01 PROCEDURE — 74011250637 HC RX REV CODE- 250/637: Performed by: INTERNAL MEDICINE

## 2018-01-01 PROCEDURE — 74011250636 HC RX REV CODE- 250/636: Performed by: EMERGENCY MEDICINE

## 2018-01-01 PROCEDURE — 74011636637 HC RX REV CODE- 636/637: Performed by: INTERNAL MEDICINE

## 2018-01-01 PROCEDURE — C1892 INTRO/SHEATH,FIXED,PEEL-AWAY: HCPCS

## 2018-01-01 PROCEDURE — 0BC38ZZ EXTIRPATION OF MATTER FROM RIGHT MAIN BRONCHUS, VIA NATURAL OR ARTIFICIAL OPENING ENDOSCOPIC: ICD-10-PCS | Performed by: INTERNAL MEDICINE

## 2018-01-01 PROCEDURE — 77030032490 HC SLV COMPR SCD KNE COVD -B

## 2018-01-01 PROCEDURE — 82803 BLOOD GASES ANY COMBINATION: CPT | Performed by: INTERNAL MEDICINE

## 2018-01-01 PROCEDURE — 93325 DOPPLER ECHO COLOR FLOW MAPG: CPT

## 2018-01-01 PROCEDURE — 31500 INSERT EMERGENCY AIRWAY: CPT

## 2018-01-01 PROCEDURE — 74011000250 HC RX REV CODE- 250: Performed by: INTERNAL MEDICINE

## 2018-01-01 PROCEDURE — 76937 US GUIDE VASCULAR ACCESS: CPT

## 2018-01-01 PROCEDURE — C1752 CATH,HEMODIALYSIS,SHORT-TERM: HCPCS

## 2018-01-01 PROCEDURE — 94003 VENT MGMT INPAT SUBQ DAY: CPT

## 2018-01-01 PROCEDURE — 83036 HEMOGLOBIN GLYCOSYLATED A1C: CPT | Performed by: INTERNAL MEDICINE

## 2018-01-01 PROCEDURE — 81001 URINALYSIS AUTO W/SCOPE: CPT | Performed by: INTERNAL MEDICINE

## 2018-01-01 PROCEDURE — 74011000250 HC RX REV CODE- 250: Performed by: EMERGENCY MEDICINE

## 2018-01-01 PROCEDURE — 85025 COMPLETE CBC W/AUTO DIFF WBC: CPT | Performed by: INTERNAL MEDICINE

## 2018-01-01 PROCEDURE — 93041 RHYTHM ECG TRACING: CPT

## 2018-01-01 PROCEDURE — 71045 X-RAY EXAM CHEST 1 VIEW: CPT

## 2018-01-01 PROCEDURE — 80053 COMPREHEN METABOLIC PANEL: CPT | Performed by: EMERGENCY MEDICINE

## 2018-01-01 PROCEDURE — 87205 SMEAR GRAM STAIN: CPT | Performed by: INTERNAL MEDICINE

## 2018-01-01 PROCEDURE — 87102 FUNGUS ISOLATION CULTURE: CPT | Performed by: INTERNAL MEDICINE

## 2018-01-01 PROCEDURE — 84484 ASSAY OF TROPONIN QUANT: CPT | Performed by: EMERGENCY MEDICINE

## 2018-01-01 PROCEDURE — 92950 HEART/LUNG RESUSCITATION CPR: CPT

## 2018-01-01 PROCEDURE — 74011000258 HC RX REV CODE- 258: Performed by: EMERGENCY MEDICINE

## 2018-01-01 PROCEDURE — 5A2204Z RESTORATION OF CARDIAC RHYTHM, SINGLE: ICD-10-PCS | Performed by: INTERNAL MEDICINE

## 2018-01-01 PROCEDURE — 93005 ELECTROCARDIOGRAM TRACING: CPT

## 2018-01-01 PROCEDURE — 96365 THER/PROPH/DIAG IV INF INIT: CPT

## 2018-01-01 PROCEDURE — 74011000250 HC RX REV CODE- 250: Performed by: NURSE PRACTITIONER

## 2018-01-01 PROCEDURE — 85027 COMPLETE CBC AUTOMATED: CPT | Performed by: INTERNAL MEDICINE

## 2018-01-01 PROCEDURE — 0BC78ZZ EXTIRPATION OF MATTER FROM LEFT MAIN BRONCHUS, VIA NATURAL OR ARTIFICIAL OPENING ENDOSCOPIC: ICD-10-PCS | Performed by: INTERNAL MEDICINE

## 2018-01-01 PROCEDURE — 77030018729 HC ELECTRD DEFIB PAD CARD -B

## 2018-01-01 PROCEDURE — 74011000250 HC RX REV CODE- 250

## 2018-01-01 PROCEDURE — 77030013140 HC MSK NEB VYRM -A

## 2018-01-01 PROCEDURE — 80076 HEPATIC FUNCTION PANEL: CPT | Performed by: INTERNAL MEDICINE

## 2018-01-01 PROCEDURE — 85025 COMPLETE CBC W/AUTO DIFF WBC: CPT | Performed by: EMERGENCY MEDICINE

## 2018-01-01 PROCEDURE — 74011000250 HC RX REV CODE- 250: Performed by: RADIOLOGY

## 2018-01-01 PROCEDURE — 74011250636 HC RX REV CODE- 250/636

## 2018-01-01 PROCEDURE — 82803 BLOOD GASES ANY COMBINATION: CPT | Performed by: EMERGENCY MEDICINE

## 2018-01-01 PROCEDURE — 51702 INSERT TEMP BLADDER CATH: CPT

## 2018-01-01 PROCEDURE — 74011000250 HC RX REV CODE- 250: Performed by: HOSPITALIST

## 2018-01-01 PROCEDURE — 80048 BASIC METABOLIC PNL TOTAL CA: CPT | Performed by: INTERNAL MEDICINE

## 2018-01-01 PROCEDURE — 77030018786 HC NDL GD F/USND BARD -B

## 2018-01-01 PROCEDURE — 77030018719 HC DRSG PTCH ANTIMIC J&J -A

## 2018-01-01 PROCEDURE — 02HV33Z INSERTION OF INFUSION DEVICE INTO SUPERIOR VENA CAVA, PERCUTANEOUS APPROACH: ICD-10-PCS | Performed by: RADIOLOGY

## 2018-01-01 PROCEDURE — 96366 THER/PROPH/DIAG IV INF ADDON: CPT

## 2018-01-01 PROCEDURE — 0BH17EZ INSERTION OF ENDOTRACHEAL AIRWAY INTO TRACHEA, VIA NATURAL OR ARTIFICIAL OPENING: ICD-10-PCS | Performed by: EMERGENCY MEDICINE

## 2018-01-01 PROCEDURE — 96375 TX/PRO/DX INJ NEW DRUG ADDON: CPT

## 2018-01-01 PROCEDURE — 82550 ASSAY OF CK (CPK): CPT | Performed by: EMERGENCY MEDICINE

## 2018-01-01 PROCEDURE — 36569 INSJ PICC 5 YR+ W/O IMAGING: CPT | Performed by: INTERNAL MEDICINE

## 2018-01-01 PROCEDURE — 31622 DX BRONCHOSCOPE/WASH: CPT

## 2018-01-01 PROCEDURE — 90945 DIALYSIS ONE EVALUATION: CPT

## 2018-01-01 PROCEDURE — 84478 ASSAY OF TRIGLYCERIDES: CPT | Performed by: INTERNAL MEDICINE

## 2018-01-01 PROCEDURE — 96367 TX/PROPH/DG ADDL SEQ IV INF: CPT

## 2018-01-01 PROCEDURE — 36600 WITHDRAWAL OF ARTERIAL BLOOD: CPT

## 2018-01-01 PROCEDURE — 77030013797 HC KT TRNSDUC PRSSR EDWD -A

## 2018-01-01 PROCEDURE — 5A1D70Z PERFORMANCE OF URINARY FILTRATION, INTERMITTENT, LESS THAN 6 HOURS PER DAY: ICD-10-PCS | Performed by: INTERNAL MEDICINE

## 2018-01-01 PROCEDURE — B24BZZ4 ULTRASONOGRAPHY OF HEART WITH AORTA, TRANSESOPHAGEAL: ICD-10-PCS | Performed by: INTERNAL MEDICINE

## 2018-01-01 PROCEDURE — 36415 COLL VENOUS BLD VENIPUNCTURE: CPT | Performed by: EMERGENCY MEDICINE

## 2018-01-01 PROCEDURE — 99285 EMERGENCY DEPT VISIT HI MDM: CPT

## 2018-01-01 PROCEDURE — 83605 ASSAY OF LACTIC ACID: CPT | Performed by: EMERGENCY MEDICINE

## 2018-01-01 PROCEDURE — 84145 PROCALCITONIN (PCT): CPT | Performed by: INTERNAL MEDICINE

## 2018-01-01 PROCEDURE — 74640000003 HC CRRT SET UP OR EXCHANGE

## 2018-01-01 PROCEDURE — 94664 DEMO&/EVAL PT USE INHALER: CPT

## 2018-01-01 PROCEDURE — 94660 CPAP INITIATION&MGMT: CPT

## 2018-01-01 PROCEDURE — 77030011256 HC DRSG MEPILEX <16IN NO BORD MOLN -A

## 2018-01-01 PROCEDURE — 99152 MOD SED SAME PHYS/QHP 5/>YRS: CPT

## 2018-01-01 PROCEDURE — 82550 ASSAY OF CK (CPK): CPT | Performed by: INTERNAL MEDICINE

## 2018-01-01 PROCEDURE — 93306 TTE W/DOPPLER COMPLETE: CPT

## 2018-01-01 PROCEDURE — 77030005514 HC CATH URETH FOL14 BARD -A

## 2018-01-01 PROCEDURE — 86022 PLATELET ANTIBODIES: CPT | Performed by: INTERNAL MEDICINE

## 2018-01-01 PROCEDURE — 83880 ASSAY OF NATRIURETIC PEPTIDE: CPT | Performed by: EMERGENCY MEDICINE

## 2018-01-01 PROCEDURE — 96368 THER/DIAG CONCURRENT INF: CPT

## 2018-01-01 PROCEDURE — 77030010894 HC CHMB TRNSF FISP -A

## 2018-01-01 PROCEDURE — 94002 VENT MGMT INPAT INIT DAY: CPT

## 2018-01-01 PROCEDURE — 74011250636 HC RX REV CODE- 250/636: Performed by: RADIOLOGY

## 2018-01-01 PROCEDURE — 92960 CARDIOVERSION ELECTRIC EXT: CPT | Performed by: INTERNAL MEDICINE

## 2018-01-01 PROCEDURE — 83605 ASSAY OF LACTIC ACID: CPT | Performed by: INTERNAL MEDICINE

## 2018-01-01 PROCEDURE — 87040 BLOOD CULTURE FOR BACTERIA: CPT | Performed by: EMERGENCY MEDICINE

## 2018-01-01 PROCEDURE — 77030008768 HC TU NG VYGC -A

## 2018-01-01 PROCEDURE — 87340 HEPATITIS B SURFACE AG IA: CPT | Performed by: INTERNAL MEDICINE

## 2018-01-01 PROCEDURE — 84484 ASSAY OF TROPONIN QUANT: CPT | Performed by: INTERNAL MEDICINE

## 2018-01-01 PROCEDURE — 77030020847 HC STATLOK BARD -A

## 2018-01-01 PROCEDURE — 74018 RADEX ABDOMEN 1 VIEW: CPT

## 2018-01-01 PROCEDURE — 77030008683 HC TU ET CUF COVD -A

## 2018-01-01 PROCEDURE — 77030012879 HC MSK CPAP FLL FAC PHIL -B

## 2018-01-01 PROCEDURE — 87210 SMEAR WET MOUNT SALINE/INK: CPT | Performed by: INTERNAL MEDICINE

## 2018-01-01 PROCEDURE — 36600 WITHDRAWAL OF ARTERIAL BLOOD: CPT | Performed by: EMERGENCY MEDICINE

## 2018-01-01 PROCEDURE — 5A1955Z RESPIRATORY VENTILATION, GREATER THAN 96 CONSECUTIVE HOURS: ICD-10-PCS | Performed by: EMERGENCY MEDICINE

## 2018-01-01 RX ORDER — METOPROLOL TARTRATE 5 MG/5ML
1.25 INJECTION INTRAVENOUS EVERY 4 HOURS
Status: DISCONTINUED | OUTPATIENT
Start: 2018-01-01 | End: 2018-01-01

## 2018-01-01 RX ORDER — POLYETHYLENE GLYCOL 3350 17 G/17G
17 POWDER, FOR SOLUTION ORAL DAILY
Status: DISCONTINUED | OUTPATIENT
Start: 2018-01-01 | End: 2018-01-01

## 2018-01-01 RX ORDER — IPRATROPIUM BROMIDE AND ALBUTEROL SULFATE 2.5; .5 MG/3ML; MG/3ML
3 SOLUTION RESPIRATORY (INHALATION)
Status: DISCONTINUED | OUTPATIENT
Start: 2018-01-01 | End: 2018-01-01

## 2018-01-01 RX ORDER — HEPARIN SODIUM 10000 [USP'U]/100ML
9.3-25 INJECTION, SOLUTION INTRAVENOUS
Status: DISCONTINUED | OUTPATIENT
Start: 2018-01-01 | End: 2018-01-01

## 2018-01-01 RX ORDER — BUDESONIDE 0.5 MG/2ML
500 INHALANT ORAL
Status: DISCONTINUED | OUTPATIENT
Start: 2018-01-01 | End: 2018-01-01

## 2018-01-01 RX ORDER — LIDOCAINE HYDROCHLORIDE 20 MG/ML
10 INJECTION, SOLUTION INFILTRATION; PERINEURAL ONCE
Status: COMPLETED | OUTPATIENT
Start: 2018-01-01 | End: 2018-01-01

## 2018-01-01 RX ORDER — FENTANYL CITRATE 50 UG/ML
50-100 INJECTION, SOLUTION INTRAMUSCULAR; INTRAVENOUS
Status: DISCONTINUED | OUTPATIENT
Start: 2018-01-01 | End: 2018-01-12 | Stop reason: HOSPADM

## 2018-01-01 RX ORDER — INSULIN LISPRO 100 [IU]/ML
INJECTION, SOLUTION INTRAVENOUS; SUBCUTANEOUS EVERY 6 HOURS
Status: DISCONTINUED | OUTPATIENT
Start: 2018-01-01 | End: 2018-01-01

## 2018-01-01 RX ORDER — INSULIN GLARGINE 100 [IU]/ML
15 INJECTION, SOLUTION SUBCUTANEOUS ONCE
Status: COMPLETED | OUTPATIENT
Start: 2018-01-01 | End: 2018-01-01

## 2018-01-01 RX ORDER — FLUCONAZOLE 2 MG/ML
400 INJECTION, SOLUTION INTRAVENOUS DAILY
Status: DISCONTINUED | OUTPATIENT
Start: 2018-01-01 | End: 2018-01-01

## 2018-01-01 RX ORDER — MIDAZOLAM HYDROCHLORIDE 1 MG/ML
2 INJECTION, SOLUTION INTRAMUSCULAR; INTRAVENOUS ONCE
Status: ACTIVE | OUTPATIENT
Start: 2018-01-01 | End: 2018-01-01

## 2018-01-01 RX ORDER — HYDROCORTISONE SODIUM SUCCINATE 100 MG/2ML
50 INJECTION, POWDER, FOR SOLUTION INTRAMUSCULAR; INTRAVENOUS EVERY 8 HOURS
Status: DISCONTINUED | OUTPATIENT
Start: 2018-01-01 | End: 2018-01-01 | Stop reason: SDUPTHER

## 2018-01-01 RX ORDER — ONDANSETRON 2 MG/ML
INJECTION INTRAMUSCULAR; INTRAVENOUS
Status: DISCONTINUED
Start: 2018-01-01 | End: 2018-01-01

## 2018-01-01 RX ORDER — MUPIROCIN CALCIUM 20 MG/G
CREAM TOPICAL 2 TIMES DAILY
Status: COMPLETED | OUTPATIENT
Start: 2018-01-01 | End: 2018-01-01

## 2018-01-01 RX ORDER — ONDANSETRON 2 MG/ML
4 INJECTION INTRAMUSCULAR; INTRAVENOUS
Status: DISCONTINUED | OUTPATIENT
Start: 2018-01-01 | End: 2018-01-01

## 2018-01-01 RX ORDER — FLUCONAZOLE 2 MG/ML
400 INJECTION, SOLUTION INTRAVENOUS ONCE
Status: COMPLETED | OUTPATIENT
Start: 2018-01-01 | End: 2018-01-01

## 2018-01-01 RX ORDER — INSULIN GLARGINE 100 [IU]/ML
5 INJECTION, SOLUTION SUBCUTANEOUS DAILY
Status: DISCONTINUED | OUTPATIENT
Start: 2018-01-01 | End: 2018-01-01

## 2018-01-01 RX ORDER — ENOXAPARIN SODIUM 100 MG/ML
1 INJECTION SUBCUTANEOUS EVERY 12 HOURS
Status: DISCONTINUED | OUTPATIENT
Start: 2018-01-01 | End: 2018-01-01

## 2018-01-01 RX ORDER — VECURONIUM BROMIDE FOR INJECTION 1 MG/ML
INJECTION, POWDER, LYOPHILIZED, FOR SOLUTION INTRAVENOUS
Status: COMPLETED
Start: 2018-01-01 | End: 2018-01-01

## 2018-01-01 RX ORDER — LABETALOL HYDROCHLORIDE 5 MG/ML
10 INJECTION, SOLUTION INTRAVENOUS
Status: DISCONTINUED | OUTPATIENT
Start: 2018-01-01 | End: 2018-01-01

## 2018-01-01 RX ORDER — SODIUM CHLORIDE 0.9 % (FLUSH) 0.9 %
5-10 SYRINGE (ML) INJECTION AS NEEDED
Status: DISCONTINUED | OUTPATIENT
Start: 2018-01-01 | End: 2018-01-12 | Stop reason: HOSPADM

## 2018-01-01 RX ORDER — LORAZEPAM 2 MG/ML
1-2 INJECTION INTRAMUSCULAR ONCE
Status: ACTIVE | OUTPATIENT
Start: 2018-01-01 | End: 2018-01-01

## 2018-01-01 RX ORDER — HEPARIN 100 UNIT/ML
500 SYRINGE INTRAVENOUS ONCE
Status: COMPLETED | OUTPATIENT
Start: 2018-01-01 | End: 2018-01-01

## 2018-01-01 RX ORDER — SUCCINYLCHOLINE CHLORIDE 20 MG/ML
100 INJECTION INTRAMUSCULAR; INTRAVENOUS
Status: COMPLETED | OUTPATIENT
Start: 2018-01-01 | End: 2018-01-01

## 2018-01-01 RX ORDER — LEVALBUTEROL INHALATION SOLUTION 1.25 MG/3ML
1.25 SOLUTION RESPIRATORY (INHALATION)
Status: COMPLETED | OUTPATIENT
Start: 2018-01-01 | End: 2018-01-01

## 2018-01-01 RX ORDER — SODIUM BICARBONATE 1 MEQ/ML
50 SYRINGE (ML) INTRAVENOUS ONCE
Status: COMPLETED | OUTPATIENT
Start: 2018-01-01 | End: 2018-01-01

## 2018-01-01 RX ORDER — VANCOMYCIN HYDROCHLORIDE
1250
Status: DISCONTINUED | OUTPATIENT
Start: 2018-01-01 | End: 2018-01-01

## 2018-01-01 RX ORDER — VANCOMYCIN 1.75 GRAM/500 ML IN 0.9 % SODIUM CHLORIDE INTRAVENOUS
1750 ONCE
Status: DISCONTINUED | OUTPATIENT
Start: 2018-01-01 | End: 2018-01-01 | Stop reason: ALTCHOICE

## 2018-01-01 RX ORDER — FUROSEMIDE 10 MG/ML
40 INJECTION INTRAMUSCULAR; INTRAVENOUS ONCE
Status: COMPLETED | OUTPATIENT
Start: 2018-01-01 | End: 2018-01-01

## 2018-01-01 RX ORDER — LEVOFLOXACIN 5 MG/ML
750 INJECTION, SOLUTION INTRAVENOUS EVERY 24 HOURS
Status: DISCONTINUED | OUTPATIENT
Start: 2018-01-01 | End: 2018-01-01

## 2018-01-01 RX ORDER — NOREPINEPHRINE BITARTRATE/D5W 8 MG/250ML
2-30 PLASTIC BAG, INJECTION (ML) INTRAVENOUS
Status: DISCONTINUED | OUTPATIENT
Start: 2018-01-01 | End: 2018-01-01

## 2018-01-01 RX ORDER — DEXTROSE 50 % IN WATER (D50W) INTRAVENOUS SYRINGE
12.5-25 AS NEEDED
Status: DISCONTINUED | OUTPATIENT
Start: 2018-01-01 | End: 2018-01-01

## 2018-01-01 RX ORDER — SODIUM CHLORIDE 0.9 % (FLUSH) 0.9 %
20 SYRINGE (ML) INJECTION EVERY 24 HOURS
Status: DISCONTINUED | OUTPATIENT
Start: 2018-01-01 | End: 2018-01-12 | Stop reason: HOSPADM

## 2018-01-01 RX ORDER — IPRATROPIUM BROMIDE AND ALBUTEROL SULFATE 2.5; .5 MG/3ML; MG/3ML
3 SOLUTION RESPIRATORY (INHALATION)
Status: DISCONTINUED | OUTPATIENT
Start: 2018-01-01 | End: 2018-01-01 | Stop reason: SDUPTHER

## 2018-01-01 RX ORDER — HEPARIN SODIUM 5000 [USP'U]/ML
2000 INJECTION, SOLUTION INTRAVENOUS; SUBCUTANEOUS
Status: DISCONTINUED | OUTPATIENT
Start: 2018-01-01 | End: 2018-01-01

## 2018-01-01 RX ORDER — SODIUM CHLORIDE 9 MG/ML
60 INJECTION, SOLUTION INTRAVENOUS CONTINUOUS
Status: DISCONTINUED | OUTPATIENT
Start: 2018-01-01 | End: 2018-01-01

## 2018-01-01 RX ORDER — FACIAL-BODY WIPES
10 EACH TOPICAL ONCE
Status: COMPLETED | OUTPATIENT
Start: 2018-01-01 | End: 2018-01-01

## 2018-01-01 RX ORDER — LEVOFLOXACIN 5 MG/ML
750 INJECTION, SOLUTION INTRAVENOUS ONCE
Status: COMPLETED | OUTPATIENT
Start: 2018-01-01 | End: 2018-01-01

## 2018-01-01 RX ORDER — PROPOFOL 10 MG/ML
0-50 VIAL (ML) INTRAVENOUS
Status: DISCONTINUED | OUTPATIENT
Start: 2018-01-01 | End: 2018-01-01

## 2018-01-01 RX ORDER — LORAZEPAM 2 MG/ML
1 INJECTION INTRAMUSCULAR
Status: COMPLETED | OUTPATIENT
Start: 2018-01-01 | End: 2018-01-01

## 2018-01-01 RX ORDER — LORAZEPAM 2 MG/ML
1 INJECTION INTRAMUSCULAR
Status: ACTIVE | OUTPATIENT
Start: 2018-01-01 | End: 2018-01-01

## 2018-01-01 RX ORDER — ONDANSETRON 2 MG/ML
4 INJECTION INTRAMUSCULAR; INTRAVENOUS
Status: COMPLETED | OUTPATIENT
Start: 2018-01-01 | End: 2018-01-01

## 2018-01-01 RX ORDER — DILTIAZEM HYDROCHLORIDE 5 MG/ML
10 INJECTION INTRAVENOUS
Status: COMPLETED | OUTPATIENT
Start: 2018-01-01 | End: 2018-01-01

## 2018-01-01 RX ORDER — FENTANYL CITRATE 50 UG/ML
50-100 INJECTION, SOLUTION INTRAMUSCULAR; INTRAVENOUS
Status: DISCONTINUED | OUTPATIENT
Start: 2018-01-01 | End: 2018-01-01

## 2018-01-01 RX ORDER — SODIUM CHLORIDE 0.9 % (FLUSH) 0.9 %
10 SYRINGE (ML) INJECTION EVERY 24 HOURS
Status: DISCONTINUED | OUTPATIENT
Start: 2018-01-01 | End: 2018-01-01 | Stop reason: SDUPTHER

## 2018-01-01 RX ORDER — VECURONIUM BROMIDE FOR INJECTION 1 MG/ML
10 INJECTION, POWDER, LYOPHILIZED, FOR SOLUTION INTRAVENOUS ONCE
Status: ACTIVE | OUTPATIENT
Start: 2018-01-01 | End: 2018-01-01

## 2018-01-01 RX ORDER — SODIUM CHLORIDE 0.9 % (FLUSH) 0.9 %
SYRINGE (ML) INJECTION
Status: COMPLETED
Start: 2018-01-01 | End: 2018-01-01

## 2018-01-01 RX ORDER — SODIUM CHLORIDE 9 MG/ML
75 INJECTION, SOLUTION INTRAVENOUS CONTINUOUS
Status: DISCONTINUED | OUTPATIENT
Start: 2018-01-01 | End: 2018-01-01

## 2018-01-01 RX ORDER — CHLORHEXIDINE GLUCONATE 1.2 MG/ML
15 RINSE ORAL EVERY 12 HOURS
Status: DISCONTINUED | OUTPATIENT
Start: 2018-01-01 | End: 2018-01-01

## 2018-01-01 RX ORDER — LEVOFLOXACIN 5 MG/ML
750 INJECTION, SOLUTION INTRAVENOUS
Status: DISCONTINUED | OUTPATIENT
Start: 2018-01-01 | End: 2018-01-01

## 2018-01-01 RX ORDER — METOPROLOL TARTRATE 5 MG/5ML
1.25 INJECTION INTRAVENOUS EVERY 6 HOURS
Status: DISCONTINUED | OUTPATIENT
Start: 2018-01-01 | End: 2018-01-01

## 2018-01-01 RX ORDER — HEPARIN SODIUM 5000 [USP'U]/ML
4000 INJECTION, SOLUTION INTRAVENOUS; SUBCUTANEOUS
Status: DISCONTINUED | OUTPATIENT
Start: 2018-01-01 | End: 2018-01-01

## 2018-01-01 RX ORDER — DILTIAZEM HYDROCHLORIDE 5 MG/ML
10 INJECTION INTRAVENOUS
Status: DISCONTINUED | OUTPATIENT
Start: 2018-01-01 | End: 2018-01-01

## 2018-01-01 RX ORDER — VANCOMYCIN/0.9 % SOD CHLORIDE 1.5G/250ML
1500 PLASTIC BAG, INJECTION (ML) INTRAVENOUS
Status: DISCONTINUED | OUTPATIENT
Start: 2018-01-01 | End: 2018-01-01

## 2018-01-01 RX ORDER — HYDROCORTISONE SODIUM SUCCINATE 100 MG/2ML
50 INJECTION, POWDER, FOR SOLUTION INTRAMUSCULAR; INTRAVENOUS EVERY 8 HOURS
Status: DISCONTINUED | OUTPATIENT
Start: 2018-01-01 | End: 2018-01-01

## 2018-01-01 RX ORDER — FLUCONAZOLE 2 MG/ML
200 INJECTION, SOLUTION INTRAVENOUS DAILY
Status: COMPLETED | OUTPATIENT
Start: 2018-01-01 | End: 2018-01-01

## 2018-01-01 RX ORDER — HYDROCORTISONE SODIUM SUCCINATE 100 MG/2ML
100 INJECTION, POWDER, FOR SOLUTION INTRAMUSCULAR; INTRAVENOUS ONCE
Status: COMPLETED | OUTPATIENT
Start: 2018-01-01 | End: 2018-01-01

## 2018-01-01 RX ORDER — LORAZEPAM 2 MG/ML
INJECTION INTRAMUSCULAR
Status: COMPLETED
Start: 2018-01-01 | End: 2018-01-01

## 2018-01-01 RX ORDER — SODIUM CHLORIDE 0.9 % (FLUSH) 0.9 %
10-30 SYRINGE (ML) INJECTION AS NEEDED
Status: DISCONTINUED | OUTPATIENT
Start: 2018-01-01 | End: 2018-01-12 | Stop reason: HOSPADM

## 2018-01-01 RX ORDER — HEPARIN 100 UNIT/ML
300 SYRINGE INTRAVENOUS AS NEEDED
Status: DISCONTINUED | OUTPATIENT
Start: 2018-01-01 | End: 2018-01-01

## 2018-01-01 RX ORDER — METOCLOPRAMIDE HYDROCHLORIDE 5 MG/ML
5 INJECTION INTRAMUSCULAR; INTRAVENOUS EVERY 6 HOURS
Status: DISCONTINUED | OUTPATIENT
Start: 2018-01-01 | End: 2018-01-01

## 2018-01-01 RX ORDER — MORPHINE SULFATE 4 MG/ML
2-5 INJECTION, SOLUTION INTRAMUSCULAR; INTRAVENOUS
Status: DISCONTINUED | OUTPATIENT
Start: 2018-01-01 | End: 2018-01-12 | Stop reason: HOSPADM

## 2018-01-01 RX ORDER — WATER FOR INJECTION,STERILE
VIAL (ML) INJECTION
Status: DISPENSED
Start: 2018-01-01 | End: 2018-01-01

## 2018-01-01 RX ORDER — BUMETANIDE 0.25 MG/ML
2 INJECTION INTRAMUSCULAR; INTRAVENOUS EVERY 12 HOURS
Status: DISCONTINUED | OUTPATIENT
Start: 2018-01-01 | End: 2018-01-01

## 2018-01-01 RX ORDER — BUMETANIDE 0.25 MG/ML
2 INJECTION INTRAMUSCULAR; INTRAVENOUS ONCE
Status: DISPENSED | OUTPATIENT
Start: 2018-01-01 | End: 2018-01-01

## 2018-01-01 RX ORDER — SODIUM CHLORIDE 0.9 % (FLUSH) 0.9 %
5-10 SYRINGE (ML) INJECTION EVERY 8 HOURS
Status: DISCONTINUED | OUTPATIENT
Start: 2018-01-01 | End: 2018-01-01 | Stop reason: SDUPTHER

## 2018-01-01 RX ORDER — SODIUM CHLORIDE 0.9 % (FLUSH) 0.9 %
5-10 SYRINGE (ML) INJECTION AS NEEDED
Status: DISCONTINUED | OUTPATIENT
Start: 2018-01-01 | End: 2018-01-01 | Stop reason: SDUPTHER

## 2018-01-01 RX ORDER — MIDAZOLAM HYDROCHLORIDE 5 MG/ML
INJECTION INTRAMUSCULAR; INTRAVENOUS
Status: COMPLETED
Start: 2018-01-01 | End: 2018-01-01

## 2018-01-01 RX ORDER — FENTANYL CITRATE 50 UG/ML
50 INJECTION, SOLUTION INTRAMUSCULAR; INTRAVENOUS
Status: DISCONTINUED | OUTPATIENT
Start: 2018-01-01 | End: 2018-01-01

## 2018-01-01 RX ORDER — METRONIDAZOLE 500 MG/100ML
500 INJECTION, SOLUTION INTRAVENOUS EVERY 12 HOURS
Status: COMPLETED | OUTPATIENT
Start: 2018-01-01 | End: 2018-01-01

## 2018-01-01 RX ORDER — HEPARIN SODIUM 5000 [USP'U]/ML
37 INJECTION, SOLUTION INTRAVENOUS; SUBCUTANEOUS ONCE
Status: COMPLETED | OUTPATIENT
Start: 2018-01-01 | End: 2018-01-01

## 2018-01-01 RX ORDER — ETOMIDATE 2 MG/ML
20 INJECTION INTRAVENOUS
Status: COMPLETED | OUTPATIENT
Start: 2018-01-01 | End: 2018-01-01

## 2018-01-01 RX ORDER — INSULIN GLARGINE 100 [IU]/ML
20 INJECTION, SOLUTION SUBCUTANEOUS DAILY
Status: DISCONTINUED | OUTPATIENT
Start: 2018-01-12 | End: 2018-01-01

## 2018-01-01 RX ORDER — SODIUM CHLORIDE 0.9 % (FLUSH) 0.9 %
10-40 SYRINGE (ML) INJECTION EVERY 8 HOURS
Status: DISCONTINUED | OUTPATIENT
Start: 2018-01-01 | End: 2018-01-12 | Stop reason: HOSPADM

## 2018-01-01 RX ORDER — HEPARIN SODIUM 200 [USP'U]/100ML
200 INJECTION, SOLUTION INTRAVENOUS ONCE
Status: COMPLETED | OUTPATIENT
Start: 2018-01-01 | End: 2018-01-01

## 2018-01-01 RX ORDER — PROPOFOL 10 MG/ML
INJECTION, EMULSION INTRAVENOUS
Status: COMPLETED
Start: 2018-01-01 | End: 2018-01-01

## 2018-01-01 RX ORDER — HEPARIN SODIUM 5000 [USP'U]/ML
5000 INJECTION, SOLUTION INTRAVENOUS; SUBCUTANEOUS EVERY 12 HOURS
Status: DISCONTINUED | OUTPATIENT
Start: 2018-01-01 | End: 2018-01-01

## 2018-01-01 RX ORDER — INSULIN GLARGINE 100 [IU]/ML
5 INJECTION, SOLUTION SUBCUTANEOUS 2 TIMES DAILY
Status: DISCONTINUED | OUTPATIENT
Start: 2018-01-01 | End: 2018-01-01

## 2018-01-01 RX ORDER — VANCOMYCIN 2 GRAM/500 ML IN 0.9 % SODIUM CHLORIDE INTRAVENOUS
2000 ONCE
Status: COMPLETED | OUTPATIENT
Start: 2018-01-01 | End: 2018-01-01

## 2018-01-01 RX ORDER — MAGNESIUM SULFATE 100 %
4 CRYSTALS MISCELLANEOUS AS NEEDED
Status: DISCONTINUED | OUTPATIENT
Start: 2018-01-01 | End: 2018-01-01

## 2018-01-01 RX ORDER — MIDAZOLAM HYDROCHLORIDE 1 MG/ML
2 INJECTION, SOLUTION INTRAMUSCULAR; INTRAVENOUS
Status: COMPLETED | OUTPATIENT
Start: 2018-01-01 | End: 2018-01-01

## 2018-01-01 RX ADMIN — HYDROCORTISONE SODIUM SUCCINATE 50 MG: 100 INJECTION, POWDER, FOR SOLUTION INTRAMUSCULAR; INTRAVENOUS at 14:17

## 2018-01-01 RX ADMIN — HYDROCORTISONE SODIUM SUCCINATE 50 MG: 100 INJECTION, POWDER, FOR SOLUTION INTRAMUSCULAR; INTRAVENOUS at 05:43

## 2018-01-01 RX ADMIN — PROPOFOL 30 MCG/KG/MIN: 10 INJECTION, EMULSION INTRAVENOUS at 13:18

## 2018-01-01 RX ADMIN — METOCLOPRAMIDE 5 MG: 5 INJECTION, SOLUTION INTRAMUSCULAR; INTRAVENOUS at 17:31

## 2018-01-01 RX ADMIN — CHLORHEXIDINE GLUCONATE 15 ML: 1.2 RINSE ORAL at 08:31

## 2018-01-01 RX ADMIN — METOCLOPRAMIDE 5 MG: 5 INJECTION, SOLUTION INTRAMUSCULAR; INTRAVENOUS at 23:12

## 2018-01-01 RX ADMIN — APIXABAN 2.5 MG: 2.5 TABLET, FILM COATED ORAL at 08:35

## 2018-01-01 RX ADMIN — MIDAZOLAM HYDROCHLORIDE 2 MG: 1 INJECTION, SOLUTION INTRAMUSCULAR; INTRAVENOUS at 07:48

## 2018-01-01 RX ADMIN — SODIUM CHLORIDE 60 ML/HR: 900 INJECTION, SOLUTION INTRAVENOUS at 07:38

## 2018-01-01 RX ADMIN — ASCORBIC ACID: 500 INJECTION, SOLUTION INTRAMUSCULAR; INTRAVENOUS; SUBCUTANEOUS at 06:02

## 2018-01-01 RX ADMIN — FAMOTIDINE 20 MG: 10 INJECTION, SOLUTION INTRAVENOUS at 08:38

## 2018-01-01 RX ADMIN — METOPROLOL TARTRATE 1.25 MG: 5 INJECTION, SOLUTION INTRAVENOUS at 20:07

## 2018-01-01 RX ADMIN — SODIUM CHLORIDE 0.7 MCG/KG/HR: 900 INJECTION, SOLUTION INTRAVENOUS at 06:51

## 2018-01-01 RX ADMIN — Medication 100 MCG/HR: at 01:42

## 2018-01-01 RX ADMIN — IPRATROPIUM BROMIDE AND ALBUTEROL SULFATE 3 ML: .5; 3 SOLUTION RESPIRATORY (INHALATION) at 03:51

## 2018-01-01 RX ADMIN — Medication 150 MCG/HR: at 09:48

## 2018-01-01 RX ADMIN — INSULIN LISPRO 4 UNITS: 100 INJECTION, SOLUTION INTRAVENOUS; SUBCUTANEOUS at 17:46

## 2018-01-01 RX ADMIN — ENOXAPARIN SODIUM 90 MG: 30 INJECTION SUBCUTANEOUS at 18:00

## 2018-01-01 RX ADMIN — VANCOMYCIN HYDROCHLORIDE 1000 MG: 1 INJECTION, POWDER, LYOPHILIZED, FOR SOLUTION INTRAVENOUS at 07:37

## 2018-01-01 RX ADMIN — CEFEPIME 2 G: 2 INJECTION, POWDER, FOR SOLUTION INTRAMUSCULAR; INTRAVENOUS at 14:06

## 2018-01-01 RX ADMIN — PROPOFOL 40 MCG/KG/MIN: 10 INJECTION, EMULSION INTRAVENOUS at 12:36

## 2018-01-01 RX ADMIN — INSULIN LISPRO 3 UNITS: 100 INJECTION, SOLUTION INTRAVENOUS; SUBCUTANEOUS at 17:55

## 2018-01-01 RX ADMIN — EPOPROSTENOL 50 NG/KG/MIN: 0.5 INJECTION, POWDER, LYOPHILIZED, FOR SOLUTION INTRAVENOUS at 19:20

## 2018-01-01 RX ADMIN — Medication 10 ML: at 21:06

## 2018-01-01 RX ADMIN — SODIUM CHLORIDE 75 ML/HR: 900 INJECTION, SOLUTION INTRAVENOUS at 20:08

## 2018-01-01 RX ADMIN — APIXABAN 5 MG: 5 TABLET, FILM COATED ORAL at 09:22

## 2018-01-01 RX ADMIN — PROPOFOL 35 MCG/KG/MIN: 10 INJECTION, EMULSION INTRAVENOUS at 03:57

## 2018-01-01 RX ADMIN — INSULIN LISPRO 3 UNITS: 100 INJECTION, SOLUTION INTRAVENOUS; SUBCUTANEOUS at 23:18

## 2018-01-01 RX ADMIN — BUMETANIDE 2 MG: 0.25 INJECTION, SOLUTION INTRAMUSCULAR; INTRAVENOUS at 09:23

## 2018-01-01 RX ADMIN — ASCORBIC ACID: 500 INJECTION, SOLUTION INTRAMUSCULAR; INTRAVENOUS; SUBCUTANEOUS at 17:54

## 2018-01-01 RX ADMIN — Medication 20 ML: at 12:01

## 2018-01-01 RX ADMIN — EPOPROSTENOL 50 NG/KG/MIN: 0.5 INJECTION, POWDER, LYOPHILIZED, FOR SOLUTION INTRAVENOUS at 10:46

## 2018-01-01 RX ADMIN — AMIODARONE HYDROCHLORIDE 0.5 MG/MIN: 50 INJECTION, SOLUTION INTRAVENOUS at 10:16

## 2018-01-01 RX ADMIN — INSULIN LISPRO 3 UNITS: 100 INJECTION, SOLUTION INTRAVENOUS; SUBCUTANEOUS at 18:21

## 2018-01-01 RX ADMIN — Medication 20 ML: at 12:42

## 2018-01-01 RX ADMIN — INSULIN LISPRO 3 UNITS: 100 INJECTION, SOLUTION INTRAVENOUS; SUBCUTANEOUS at 23:13

## 2018-01-01 RX ADMIN — HYDROCORTISONE SODIUM SUCCINATE 100 MG: 100 INJECTION, POWDER, FOR SOLUTION INTRAMUSCULAR; INTRAVENOUS at 06:58

## 2018-01-01 RX ADMIN — INSULIN LISPRO 10 UNITS: 100 INJECTION, SOLUTION INTRAVENOUS; SUBCUTANEOUS at 23:24

## 2018-01-01 RX ADMIN — METRONIDAZOLE 500 MG: 500 INJECTION, SOLUTION INTRAVENOUS at 09:35

## 2018-01-01 RX ADMIN — METOCLOPRAMIDE 5 MG: 5 INJECTION, SOLUTION INTRAMUSCULAR; INTRAVENOUS at 23:16

## 2018-01-01 RX ADMIN — Medication: at 18:14

## 2018-01-01 RX ADMIN — METOCLOPRAMIDE 5 MG: 5 INJECTION, SOLUTION INTRAMUSCULAR; INTRAVENOUS at 17:09

## 2018-01-01 RX ADMIN — NOREPINEPHRINE BITARTRATE 10 MCG/MIN: 1 INJECTION INTRAVENOUS at 08:11

## 2018-01-01 RX ADMIN — METOCLOPRAMIDE 5 MG: 5 INJECTION, SOLUTION INTRAMUSCULAR; INTRAVENOUS at 05:04

## 2018-01-01 RX ADMIN — IPRATROPIUM BROMIDE AND ALBUTEROL SULFATE 3 ML: .5; 3 SOLUTION RESPIRATORY (INHALATION) at 11:28

## 2018-01-01 RX ADMIN — CHLORHEXIDINE GLUCONATE 15 ML: 1.2 RINSE ORAL at 08:53

## 2018-01-01 RX ADMIN — PROPOFOL 35 MCG/KG/MIN: 10 INJECTION, EMULSION INTRAVENOUS at 23:27

## 2018-01-01 RX ADMIN — PROPOFOL 30 MCG/KG/MIN: 10 INJECTION, EMULSION INTRAVENOUS at 00:39

## 2018-01-01 RX ADMIN — Medication 10 ML: at 21:05

## 2018-01-01 RX ADMIN — MUPIROCIN CALCIUM: 20 CREAM TOPICAL at 08:30

## 2018-01-01 RX ADMIN — Medication: at 17:30

## 2018-01-01 RX ADMIN — ASCORBIC ACID: 500 INJECTION, SOLUTION INTRAMUSCULAR; INTRAVENOUS; SUBCUTANEOUS at 08:01

## 2018-01-01 RX ADMIN — METHYLPREDNISOLONE SODIUM SUCCINATE 40 MG: 40 INJECTION, POWDER, FOR SOLUTION INTRAMUSCULAR; INTRAVENOUS at 23:16

## 2018-01-01 RX ADMIN — IPRATROPIUM BROMIDE AND ALBUTEROL SULFATE 3 ML: .5; 3 SOLUTION RESPIRATORY (INHALATION) at 19:43

## 2018-01-01 RX ADMIN — METHYLPREDNISOLONE SODIUM SUCCINATE 40 MG: 40 INJECTION, POWDER, FOR SOLUTION INTRAMUSCULAR; INTRAVENOUS at 05:07

## 2018-01-01 RX ADMIN — PROPOFOL 25 MCG/KG/MIN: 10 INJECTION, EMULSION INTRAVENOUS at 18:22

## 2018-01-01 RX ADMIN — Medication: at 18:00

## 2018-01-01 RX ADMIN — METHYLPREDNISOLONE SODIUM SUCCINATE 40 MG: 40 INJECTION, POWDER, FOR SOLUTION INTRAMUSCULAR; INTRAVENOUS at 11:57

## 2018-01-01 RX ADMIN — BUDESONIDE 500 MCG: 0.5 SUSPENSION RESPIRATORY (INHALATION) at 19:02

## 2018-01-01 RX ADMIN — FAMOTIDINE 20 MG: 10 INJECTION, SOLUTION INTRAVENOUS at 08:29

## 2018-01-01 RX ADMIN — VANCOMYCIN HYDROCHLORIDE 1500 MG: 10 INJECTION, POWDER, LYOPHILIZED, FOR SOLUTION INTRAVENOUS at 07:42

## 2018-01-01 RX ADMIN — Medication 40 ML: at 05:04

## 2018-01-01 RX ADMIN — APIXABAN 5 MG: 5 TABLET, FILM COATED ORAL at 20:56

## 2018-01-01 RX ADMIN — NOREPINEPHRINE BITARTRATE 4 MCG/MIN: 1 INJECTION INTRAVENOUS at 12:34

## 2018-01-01 RX ADMIN — AMIODARONE HYDROCHLORIDE 150 MG: 50 INJECTION, SOLUTION INTRAVENOUS at 13:09

## 2018-01-01 RX ADMIN — BUMETANIDE 2 MG: 0.25 INJECTION, SOLUTION INTRAMUSCULAR; INTRAVENOUS at 09:05

## 2018-01-01 RX ADMIN — AMIODARONE HYDROCHLORIDE 1 MG/MIN: 50 INJECTION, SOLUTION INTRAVENOUS at 07:58

## 2018-01-01 RX ADMIN — CALCIUM CHLORIDE, MAGNESIUM CHLORIDE, DEXTROSE MONOHYDRATE, LACTIC ACID, SODIUM CHLORIDE, SODIUM BICARBONATE AND POTASSIUM CHLORIDE 2000 ML/HR: 5.15; 2.03; 22; 5.4; 6.46; 3.09; .157 INJECTION INTRAVENOUS at 00:41

## 2018-01-01 RX ADMIN — LEVOFLOXACIN 750 MG: 5 INJECTION, SOLUTION INTRAVENOUS at 00:36

## 2018-01-01 RX ADMIN — METOCLOPRAMIDE 5 MG: 5 INJECTION, SOLUTION INTRAMUSCULAR; INTRAVENOUS at 18:00

## 2018-01-01 RX ADMIN — EPOPROSTENOL 50 NG/KG/MIN: 0.5 INJECTION, POWDER, LYOPHILIZED, FOR SOLUTION INTRAVENOUS at 05:45

## 2018-01-01 RX ADMIN — ASCORBIC ACID: 500 INJECTION, SOLUTION INTRAMUSCULAR; INTRAVENOUS; SUBCUTANEOUS at 20:59

## 2018-01-01 RX ADMIN — Medication: at 09:07

## 2018-01-01 RX ADMIN — METRONIDAZOLE 500 MG: 500 INJECTION, SOLUTION INTRAVENOUS at 20:56

## 2018-01-01 RX ADMIN — LEVOFLOXACIN 750 MG: 5 INJECTION, SOLUTION INTRAVENOUS at 02:34

## 2018-01-01 RX ADMIN — Medication 20 ML: at 12:35

## 2018-01-01 RX ADMIN — METOCLOPRAMIDE 5 MG: 5 INJECTION, SOLUTION INTRAMUSCULAR; INTRAVENOUS at 05:19

## 2018-01-01 RX ADMIN — FAMOTIDINE 20 MG: 10 INJECTION, SOLUTION INTRAVENOUS at 21:22

## 2018-01-01 RX ADMIN — METRONIDAZOLE 500 MG: 500 INJECTION, SOLUTION INTRAVENOUS at 20:40

## 2018-01-01 RX ADMIN — AMIODARONE HYDROCHLORIDE 1 MG/MIN: 50 INJECTION, SOLUTION INTRAVENOUS at 23:47

## 2018-01-01 RX ADMIN — METOCLOPRAMIDE 5 MG: 5 INJECTION, SOLUTION INTRAMUSCULAR; INTRAVENOUS at 11:55

## 2018-01-01 RX ADMIN — WATER: 1000 INJECTION, SOLUTION INTRAVENOUS at 10:08

## 2018-01-01 RX ADMIN — METHYLPREDNISOLONE SODIUM SUCCINATE 80 MG: 40 INJECTION, POWDER, FOR SOLUTION INTRAMUSCULAR; INTRAVENOUS at 12:28

## 2018-01-01 RX ADMIN — EPOPROSTENOL 50 NG/KG/MIN: 0.5 INJECTION, POWDER, LYOPHILIZED, FOR SOLUTION INTRAVENOUS at 04:35

## 2018-01-01 RX ADMIN — LIDOCAINE HYDROCHLORIDE 200 MG: 20 INJECTION, SOLUTION INFILTRATION; PERINEURAL at 13:10

## 2018-01-01 RX ADMIN — PROPOFOL 50 MCG/KG/MIN: 10 INJECTION, EMULSION INTRAVENOUS at 04:18

## 2018-01-01 RX ADMIN — PROPOFOL 45 MCG/KG/MIN: 10 INJECTION, EMULSION INTRAVENOUS at 13:15

## 2018-01-01 RX ADMIN — METOPROLOL TARTRATE 1.25 MG: 5 INJECTION, SOLUTION INTRAVENOUS at 08:56

## 2018-01-01 RX ADMIN — Medication 20 MCG/MIN: at 16:59

## 2018-01-01 RX ADMIN — CEFEPIME HYDROCHLORIDE 2 G: 2 INJECTION, POWDER, FOR SOLUTION INTRAVENOUS at 11:38

## 2018-01-01 RX ADMIN — PROPOFOL 35 MCG/KG/MIN: 10 INJECTION, EMULSION INTRAVENOUS at 18:57

## 2018-01-01 RX ADMIN — INSULIN GLARGINE 15 UNITS: 100 INJECTION, SOLUTION SUBCUTANEOUS at 12:40

## 2018-01-01 RX ADMIN — INSULIN LISPRO 3 UNITS: 100 INJECTION, SOLUTION INTRAVENOUS; SUBCUTANEOUS at 23:23

## 2018-01-01 RX ADMIN — ENOXAPARIN SODIUM 70 MG: 80 INJECTION, SOLUTION INTRAVENOUS; SUBCUTANEOUS at 06:48

## 2018-01-01 RX ADMIN — EPOPROSTENOL 50 NG/KG/MIN: 0.5 INJECTION, POWDER, LYOPHILIZED, FOR SOLUTION INTRAVENOUS at 04:02

## 2018-01-01 RX ADMIN — IPRATROPIUM BROMIDE AND ALBUTEROL SULFATE 3 ML: .5; 3 SOLUTION RESPIRATORY (INHALATION) at 12:40

## 2018-01-01 RX ADMIN — EPOPROSTENOL 50 NG/KG/MIN: 0.5 INJECTION, POWDER, LYOPHILIZED, FOR SOLUTION INTRAVENOUS at 21:57

## 2018-01-01 RX ADMIN — METRONIDAZOLE 500 MG: 500 INJECTION, SOLUTION INTRAVENOUS at 20:04

## 2018-01-01 RX ADMIN — PROPOFOL 30 MCG/KG/MIN: 10 INJECTION, EMULSION INTRAVENOUS at 14:33

## 2018-01-01 RX ADMIN — IPRATROPIUM BROMIDE AND ALBUTEROL SULFATE 3 ML: .5; 3 SOLUTION RESPIRATORY (INHALATION) at 08:24

## 2018-01-01 RX ADMIN — Medication 100 MCG/HR: at 10:23

## 2018-01-01 RX ADMIN — DEXTROSE MONOHYDRATE 25 G: 25 INJECTION, SOLUTION INTRAVENOUS at 23:52

## 2018-01-01 RX ADMIN — CALCIUM CHLORIDE, MAGNESIUM CHLORIDE, DEXTROSE MONOHYDRATE, LACTIC ACID, SODIUM CHLORIDE, SODIUM BICARBONATE AND POTASSIUM CHLORIDE 2000 ML/HR: 5.15; 2.03; 22; 5.4; 6.46; 3.09; .157 INJECTION INTRAVENOUS at 01:52

## 2018-01-01 RX ADMIN — CALCIUM CHLORIDE, MAGNESIUM CHLORIDE, DEXTROSE MONOHYDRATE, LACTIC ACID, SODIUM CHLORIDE, SODIUM BICARBONATE AND POTASSIUM CHLORIDE 2000 ML/HR: 5.15; 2.03; 22; 5.4; 6.46; 3.09; .157 INJECTION INTRAVENOUS at 03:28

## 2018-01-01 RX ADMIN — IPRATROPIUM BROMIDE AND ALBUTEROL SULFATE 3 ML: .5; 3 SOLUTION RESPIRATORY (INHALATION) at 07:09

## 2018-01-01 RX ADMIN — INSULIN LISPRO 5 UNITS: 100 INJECTION, SOLUTION INTRAVENOUS; SUBCUTANEOUS at 00:26

## 2018-01-01 RX ADMIN — CALCIUM CHLORIDE, MAGNESIUM CHLORIDE, DEXTROSE MONOHYDRATE, LACTIC ACID, SODIUM CHLORIDE, SODIUM BICARBONATE AND POTASSIUM CHLORIDE 2000 ML/HR: 5.15; 2.03; 22; 5.4; 6.46; 3.09; .157 INJECTION INTRAVENOUS at 07:08

## 2018-01-01 RX ADMIN — PROPOFOL 35 MCG/KG/MIN: 10 INJECTION, EMULSION INTRAVENOUS at 07:41

## 2018-01-01 RX ADMIN — INSULIN LISPRO 3 UNITS: 100 INJECTION, SOLUTION INTRAVENOUS; SUBCUTANEOUS at 06:00

## 2018-01-01 RX ADMIN — PROPOFOL 30 MCG/KG/MIN: 10 INJECTION, EMULSION INTRAVENOUS at 06:53

## 2018-01-01 RX ADMIN — MUPIROCIN CALCIUM: 20 CREAM TOPICAL at 10:12

## 2018-01-01 RX ADMIN — Medication: at 16:31

## 2018-01-01 RX ADMIN — INSULIN GLARGINE 5 UNITS: 100 INJECTION, SOLUTION SUBCUTANEOUS at 08:35

## 2018-01-01 RX ADMIN — ONDANSETRON HYDROCHLORIDE 4 MG: 2 INJECTION, SOLUTION INTRAMUSCULAR; INTRAVENOUS at 01:04

## 2018-01-01 RX ADMIN — DILTIAZEM HYDROCHLORIDE 10 MG: 5 INJECTION INTRAVENOUS at 03:57

## 2018-01-01 RX ADMIN — CEFEPIME HYDROCHLORIDE 2 G: 2 INJECTION, POWDER, FOR SOLUTION INTRAVENOUS at 01:48

## 2018-01-01 RX ADMIN — Medication: at 09:00

## 2018-01-01 RX ADMIN — SODIUM CHLORIDE 500 ML: 900 INJECTION, SOLUTION INTRAVENOUS at 02:45

## 2018-01-01 RX ADMIN — Medication: at 17:49

## 2018-01-01 RX ADMIN — METOCLOPRAMIDE 5 MG: 5 INJECTION, SOLUTION INTRAMUSCULAR; INTRAVENOUS at 12:13

## 2018-01-01 RX ADMIN — METOCLOPRAMIDE 5 MG: 5 INJECTION, SOLUTION INTRAMUSCULAR; INTRAVENOUS at 05:44

## 2018-01-01 RX ADMIN — MUPIROCIN CALCIUM: 20 CREAM TOPICAL at 18:00

## 2018-01-01 RX ADMIN — SODIUM CHLORIDE 0.4 MCG/KG/HR: 900 INJECTION, SOLUTION INTRAVENOUS at 02:43

## 2018-01-01 RX ADMIN — INSULIN LISPRO 7 UNITS: 100 INJECTION, SOLUTION INTRAVENOUS; SUBCUTANEOUS at 05:45

## 2018-01-01 RX ADMIN — ASCORBIC ACID: 500 INJECTION, SOLUTION INTRAMUSCULAR; INTRAVENOUS; SUBCUTANEOUS at 16:42

## 2018-01-01 RX ADMIN — CHLORHEXIDINE GLUCONATE 15 ML: 1.2 RINSE ORAL at 20:35

## 2018-01-01 RX ADMIN — APIXABAN 5 MG: 5 TABLET, FILM COATED ORAL at 12:27

## 2018-01-01 RX ADMIN — SODIUM BICARBONATE: 84 INJECTION, SOLUTION INTRAVENOUS at 21:15

## 2018-01-01 RX ADMIN — CHLORHEXIDINE GLUCONATE 15 ML: 1.2 RINSE ORAL at 20:37

## 2018-01-01 RX ADMIN — IPRATROPIUM BROMIDE AND ALBUTEROL SULFATE 3 ML: .5; 3 SOLUTION RESPIRATORY (INHALATION) at 02:31

## 2018-01-01 RX ADMIN — HYDROCORTISONE SODIUM SUCCINATE 50 MG: 100 INJECTION, POWDER, FOR SOLUTION INTRAMUSCULAR; INTRAVENOUS at 22:23

## 2018-01-01 RX ADMIN — HEPARIN SODIUM AND DEXTROSE 9.3 UNITS/KG/HR: 10000; 5 INJECTION INTRAVENOUS at 15:27

## 2018-01-01 RX ADMIN — INSULIN GLARGINE 5 UNITS: 100 INJECTION, SOLUTION SUBCUTANEOUS at 21:51

## 2018-01-01 RX ADMIN — HYDROCORTISONE SODIUM SUCCINATE 50 MG: 100 INJECTION, POWDER, FOR SOLUTION INTRAMUSCULAR; INTRAVENOUS at 13:38

## 2018-01-01 RX ADMIN — EPOPROSTENOL 50 NG/KG/MIN: 0.5 INJECTION, POWDER, LYOPHILIZED, FOR SOLUTION INTRAVENOUS at 22:38

## 2018-01-01 RX ADMIN — IPRATROPIUM BROMIDE AND ALBUTEROL SULFATE 3 ML: .5; 3 SOLUTION RESPIRATORY (INHALATION) at 21:10

## 2018-01-01 RX ADMIN — Medication 10 ML: at 06:44

## 2018-01-01 RX ADMIN — METHYLPREDNISOLONE SODIUM SUCCINATE 40 MG: 40 INJECTION, POWDER, FOR SOLUTION INTRAMUSCULAR; INTRAVENOUS at 05:19

## 2018-01-01 RX ADMIN — INSULIN LISPRO 3 UNITS: 100 INJECTION, SOLUTION INTRAVENOUS; SUBCUTANEOUS at 18:40

## 2018-01-01 RX ADMIN — INSULIN HUMAN 15 UNITS: 100 INJECTION, SUSPENSION SUBCUTANEOUS at 22:06

## 2018-01-01 RX ADMIN — FAMOTIDINE 20 MG: 10 INJECTION, SOLUTION INTRAVENOUS at 08:28

## 2018-01-01 RX ADMIN — METOPROLOL TARTRATE 1.25 MG: 5 INJECTION, SOLUTION INTRAVENOUS at 00:18

## 2018-01-01 RX ADMIN — CHLORHEXIDINE GLUCONATE 15 ML: 1.2 RINSE ORAL at 21:51

## 2018-01-01 RX ADMIN — EPOPROSTENOL 50 NG/KG/MIN: 0.5 INJECTION, POWDER, LYOPHILIZED, FOR SOLUTION INTRAVENOUS at 09:51

## 2018-01-01 RX ADMIN — IPRATROPIUM BROMIDE AND ALBUTEROL SULFATE 3 ML: .5; 3 SOLUTION RESPIRATORY (INHALATION) at 13:47

## 2018-01-01 RX ADMIN — ENOXAPARIN SODIUM 70 MG: 80 INJECTION, SOLUTION INTRAVENOUS; SUBCUTANEOUS at 18:40

## 2018-01-01 RX ADMIN — POLYETHYLENE GLYCOL 3350 17 G: 17 POWDER, FOR SOLUTION ORAL at 08:31

## 2018-01-01 RX ADMIN — Medication 10 ML: at 06:32

## 2018-01-01 RX ADMIN — Medication 10 ML: at 13:54

## 2018-01-01 RX ADMIN — NOREPINEPHRINE BITARTRATE 4.5 MCG/MIN: 1 INJECTION INTRAVENOUS at 10:13

## 2018-01-01 RX ADMIN — PROPOFOL 30 MCG/KG/MIN: 10 INJECTION, EMULSION INTRAVENOUS at 12:35

## 2018-01-01 RX ADMIN — METOPROLOL TARTRATE 1.25 MG: 5 INJECTION, SOLUTION INTRAVENOUS at 04:00

## 2018-01-01 RX ADMIN — Medication 10 ML: at 13:32

## 2018-01-01 RX ADMIN — CALCIUM CHLORIDE, MAGNESIUM CHLORIDE, DEXTROSE MONOHYDRATE, LACTIC ACID, SODIUM CHLORIDE, SODIUM BICARBONATE AND POTASSIUM CHLORIDE 2000 ML/HR: 5.15; 2.03; 22; 5.4; 6.46; 3.09; .157 INJECTION INTRAVENOUS at 15:31

## 2018-01-01 RX ADMIN — METHYLPREDNISOLONE SODIUM SUCCINATE 40 MG: 40 INJECTION, POWDER, FOR SOLUTION INTRAMUSCULAR; INTRAVENOUS at 17:30

## 2018-01-01 RX ADMIN — METOCLOPRAMIDE 5 MG: 5 INJECTION, SOLUTION INTRAMUSCULAR; INTRAVENOUS at 11:56

## 2018-01-01 RX ADMIN — SODIUM CHLORIDE 22.4 ML/HR: 900 INJECTION, SOLUTION INTRAVENOUS at 12:12

## 2018-01-01 RX ADMIN — HYDROCORTISONE SODIUM SUCCINATE 50 MG: 100 INJECTION, POWDER, FOR SOLUTION INTRAMUSCULAR; INTRAVENOUS at 21:51

## 2018-01-01 RX ADMIN — FLUCONAZOLE 200 MG: 2 INJECTION INTRAVENOUS at 09:32

## 2018-01-01 RX ADMIN — Medication 40 ML: at 21:22

## 2018-01-01 RX ADMIN — METHYLPREDNISOLONE SODIUM SUCCINATE 40 MG: 40 INJECTION, POWDER, FOR SOLUTION INTRAMUSCULAR; INTRAVENOUS at 17:09

## 2018-01-01 RX ADMIN — Medication 10 ML: at 14:20

## 2018-01-01 RX ADMIN — FENTANYL CITRATE 100 MCG: 50 INJECTION, SOLUTION INTRAMUSCULAR; INTRAVENOUS at 21:55

## 2018-01-01 RX ADMIN — MUPIROCIN CALCIUM: 20 CREAM TOPICAL at 09:06

## 2018-01-01 RX ADMIN — PROPOFOL 15 MCG/KG/MIN: 10 INJECTION, EMULSION INTRAVENOUS at 14:41

## 2018-01-01 RX ADMIN — CALCIUM CHLORIDE, MAGNESIUM CHLORIDE, DEXTROSE MONOHYDRATE, LACTIC ACID, SODIUM CHLORIDE, SODIUM BICARBONATE AND POTASSIUM CHLORIDE 2000 ML/HR: 5.15; 2.03; 22; 5.4; 6.46; 3.09; .157 INJECTION INTRAVENOUS at 14:42

## 2018-01-01 RX ADMIN — EPOPROSTENOL 50 NG/KG/MIN: 0.5 INJECTION, POWDER, LYOPHILIZED, FOR SOLUTION INTRAVENOUS at 02:42

## 2018-01-01 RX ADMIN — INSULIN LISPRO 7 UNITS: 100 INJECTION, SOLUTION INTRAVENOUS; SUBCUTANEOUS at 06:00

## 2018-01-01 RX ADMIN — CALCIUM CHLORIDE, MAGNESIUM CHLORIDE, DEXTROSE MONOHYDRATE, LACTIC ACID, SODIUM CHLORIDE, SODIUM BICARBONATE AND POTASSIUM CHLORIDE 2000 ML/HR: 5.15; 2.03; 22; 5.4; 6.46; 3.09; .157 INJECTION INTRAVENOUS at 02:40

## 2018-01-01 RX ADMIN — POLYETHYLENE GLYCOL 3350 17 G: 17 POWDER, FOR SOLUTION ORAL at 08:53

## 2018-01-01 RX ADMIN — METHYLPREDNISOLONE SODIUM SUCCINATE 40 MG: 40 INJECTION, POWDER, FOR SOLUTION INTRAMUSCULAR; INTRAVENOUS at 17:47

## 2018-01-01 RX ADMIN — IPRATROPIUM BROMIDE AND ALBUTEROL SULFATE 3 ML: .5; 3 SOLUTION RESPIRATORY (INHALATION) at 13:53

## 2018-01-01 RX ADMIN — METOCLOPRAMIDE 5 MG: 5 INJECTION, SOLUTION INTRAMUSCULAR; INTRAVENOUS at 23:08

## 2018-01-01 RX ADMIN — HYDROCORTISONE SODIUM SUCCINATE 50 MG: 100 INJECTION, POWDER, FOR SOLUTION INTRAMUSCULAR; INTRAVENOUS at 06:00

## 2018-01-01 RX ADMIN — IPRATROPIUM BROMIDE AND ALBUTEROL SULFATE 3 ML: .5; 3 SOLUTION RESPIRATORY (INHALATION) at 19:30

## 2018-01-01 RX ADMIN — IPRATROPIUM BROMIDE AND ALBUTEROL SULFATE 3 ML: .5; 3 SOLUTION RESPIRATORY (INHALATION) at 01:04

## 2018-01-01 RX ADMIN — Medication 10 ML: at 21:52

## 2018-01-01 RX ADMIN — CHLORHEXIDINE GLUCONATE 15 ML: 1.2 RINSE ORAL at 09:41

## 2018-01-01 RX ADMIN — IPRATROPIUM BROMIDE AND ALBUTEROL SULFATE 3 ML: .5; 3 SOLUTION RESPIRATORY (INHALATION) at 01:01

## 2018-01-01 RX ADMIN — SODIUM PHOSPHATE, MONOBASIC, MONOHYDRATE AND SODIUM PHOSPHATE, DIBASIC ANHYDROUS: 276; 142 INJECTION, SOLUTION INTRAVENOUS at 17:04

## 2018-01-01 RX ADMIN — SODIUM CHLORIDE 500 ML: 900 INJECTION, SOLUTION INTRAVENOUS at 06:47

## 2018-01-01 RX ADMIN — INSULIN LISPRO 3 UNITS: 100 INJECTION, SOLUTION INTRAVENOUS; SUBCUTANEOUS at 00:00

## 2018-01-01 RX ADMIN — BUDESONIDE 500 MCG: 0.5 SUSPENSION RESPIRATORY (INHALATION) at 08:03

## 2018-01-01 RX ADMIN — Medication 20 ML: at 14:07

## 2018-01-01 RX ADMIN — CALCIUM CHLORIDE, MAGNESIUM CHLORIDE, DEXTROSE MONOHYDRATE, LACTIC ACID, SODIUM CHLORIDE, SODIUM BICARBONATE AND POTASSIUM CHLORIDE 2000 ML/HR: 5.15; 2.03; 22; 5.4; 6.46; 3.09; .157 INJECTION INTRAVENOUS at 18:09

## 2018-01-01 RX ADMIN — METOROPROLOL TARTRATE 1.25 MG: 5 INJECTION, SOLUTION INTRAVENOUS at 06:32

## 2018-01-01 RX ADMIN — Medication 10 ML: at 14:08

## 2018-01-01 RX ADMIN — Medication 10 ML: at 14:07

## 2018-01-01 RX ADMIN — PROPOFOL 35 MCG/KG/MIN: 10 INJECTION, EMULSION INTRAVENOUS at 06:54

## 2018-01-01 RX ADMIN — Medication 10 ML: at 21:22

## 2018-01-01 RX ADMIN — PROPOFOL 38 MCG/KG/MIN: 10 INJECTION, EMULSION INTRAVENOUS at 03:40

## 2018-01-01 RX ADMIN — METHYLPREDNISOLONE SODIUM SUCCINATE 40 MG: 40 INJECTION, POWDER, FOR SOLUTION INTRAMUSCULAR; INTRAVENOUS at 11:55

## 2018-01-01 RX ADMIN — Medication 10 ML: at 05:42

## 2018-01-01 RX ADMIN — POLYETHYLENE GLYCOL 3350 17 G: 17 POWDER, FOR SOLUTION ORAL at 17:00

## 2018-01-01 RX ADMIN — FENTANYL CITRATE 100 MCG: 50 INJECTION, SOLUTION INTRAMUSCULAR; INTRAVENOUS at 10:59

## 2018-01-01 RX ADMIN — MUPIROCIN CALCIUM: 20 CREAM TOPICAL at 18:46

## 2018-01-01 RX ADMIN — CEFEPIME 2 G: 2 INJECTION, POWDER, FOR SOLUTION INTRAMUSCULAR; INTRAVENOUS at 01:04

## 2018-01-01 RX ADMIN — INSULIN LISPRO 3 UNITS: 100 INJECTION, SOLUTION INTRAVENOUS; SUBCUTANEOUS at 12:25

## 2018-01-01 RX ADMIN — BUDESONIDE 500 MCG: 0.5 SUSPENSION RESPIRATORY (INHALATION) at 07:24

## 2018-01-01 RX ADMIN — Medication 10 ML: at 21:51

## 2018-01-01 RX ADMIN — IPRATROPIUM BROMIDE AND ALBUTEROL SULFATE 3 ML: .5; 3 SOLUTION RESPIRATORY (INHALATION) at 15:55

## 2018-01-01 RX ADMIN — MUPIROCIN CALCIUM: 20 CREAM TOPICAL at 17:39

## 2018-01-01 RX ADMIN — SODIUM BICARBONATE: 84 INJECTION, SOLUTION INTRAVENOUS at 15:17

## 2018-01-01 RX ADMIN — AMIODARONE HYDROCHLORIDE 0.5 MG/MIN: 50 INJECTION, SOLUTION INTRAVENOUS at 18:18

## 2018-01-01 RX ADMIN — WATER: 1000 INJECTION, SOLUTION INTRAVENOUS at 02:04

## 2018-01-01 RX ADMIN — AMIODARONE HYDROCHLORIDE 1 MG/MIN: 50 INJECTION, SOLUTION INTRAVENOUS at 09:30

## 2018-01-01 RX ADMIN — NOREPINEPHRINE BITARTRATE 2 MCG/MIN: 1 INJECTION INTRAVENOUS at 09:00

## 2018-01-01 RX ADMIN — PROPOFOL 40 MCG/KG/MIN: 10 INJECTION, EMULSION INTRAVENOUS at 08:27

## 2018-01-01 RX ADMIN — CHLORHEXIDINE GLUCONATE 15 ML: 1.2 RINSE ORAL at 09:09

## 2018-01-01 RX ADMIN — VANCOMYCIN HYDROCHLORIDE 1500 MG: 10 INJECTION, POWDER, LYOPHILIZED, FOR SOLUTION INTRAVENOUS at 14:20

## 2018-01-01 RX ADMIN — AMIODARONE HYDROCHLORIDE 1 MG/MIN: 50 INJECTION, SOLUTION INTRAVENOUS at 17:53

## 2018-01-01 RX ADMIN — FAMOTIDINE 20 MG: 10 INJECTION, SOLUTION INTRAVENOUS at 22:24

## 2018-01-01 RX ADMIN — IPRATROPIUM BROMIDE AND ALBUTEROL SULFATE 3 ML: .5; 3 SOLUTION RESPIRATORY (INHALATION) at 11:16

## 2018-01-01 RX ADMIN — METRONIDAZOLE 500 MG: 500 INJECTION, SOLUTION INTRAVENOUS at 08:31

## 2018-01-01 RX ADMIN — PROPOFOL 30 MCG/KG/MIN: 10 INJECTION, EMULSION INTRAVENOUS at 08:30

## 2018-01-01 RX ADMIN — METOCLOPRAMIDE 5 MG: 5 INJECTION, SOLUTION INTRAMUSCULAR; INTRAVENOUS at 12:41

## 2018-01-01 RX ADMIN — ASCORBIC ACID: 500 INJECTION, SOLUTION INTRAMUSCULAR; INTRAVENOUS; SUBCUTANEOUS at 08:35

## 2018-01-01 RX ADMIN — CALCIUM CHLORIDE, MAGNESIUM CHLORIDE, DEXTROSE MONOHYDRATE, LACTIC ACID, SODIUM CHLORIDE, SODIUM BICARBONATE AND POTASSIUM CHLORIDE 2000 ML/HR: 5.15; 2.03; 22; 5.4; 6.46; 3.09; .157 INJECTION INTRAVENOUS at 11:07

## 2018-01-01 RX ADMIN — EPOPROSTENOL 50 NG/KG/MIN: 0.5 INJECTION, POWDER, LYOPHILIZED, FOR SOLUTION INTRAVENOUS at 00:33

## 2018-01-01 RX ADMIN — METRONIDAZOLE 500 MG: 500 INJECTION, SOLUTION INTRAVENOUS at 21:04

## 2018-01-01 RX ADMIN — FAMOTIDINE 20 MG: 10 INJECTION, SOLUTION INTRAVENOUS at 08:53

## 2018-01-01 RX ADMIN — CALCIUM CHLORIDE, MAGNESIUM CHLORIDE, DEXTROSE MONOHYDRATE, LACTIC ACID, SODIUM CHLORIDE, SODIUM BICARBONATE AND POTASSIUM CHLORIDE 2000 ML/HR: 5.15; 2.03; 22; 5.4; 6.46; 3.09; .157 INJECTION INTRAVENOUS at 08:30

## 2018-01-01 RX ADMIN — LORAZEPAM 1 MG: 2 INJECTION INTRAMUSCULAR; INTRAVENOUS at 03:33

## 2018-01-01 RX ADMIN — FLUCONAZOLE 400 MG: 2 INJECTION INTRAVENOUS at 09:41

## 2018-01-01 RX ADMIN — IPRATROPIUM BROMIDE AND ALBUTEROL SULFATE 3 ML: .5; 3 SOLUTION RESPIRATORY (INHALATION) at 15:23

## 2018-01-01 RX ADMIN — FENTANYL CITRATE 100 MCG: 50 INJECTION, SOLUTION INTRAMUSCULAR; INTRAVENOUS at 05:07

## 2018-01-01 RX ADMIN — METOPROLOL TARTRATE 1.25 MG: 5 INJECTION, SOLUTION INTRAVENOUS at 16:35

## 2018-01-01 RX ADMIN — IPRATROPIUM BROMIDE AND ALBUTEROL SULFATE 3 ML: .5; 3 SOLUTION RESPIRATORY (INHALATION) at 19:52

## 2018-01-01 RX ADMIN — IPRATROPIUM BROMIDE AND ALBUTEROL SULFATE 3 ML: .5; 3 SOLUTION RESPIRATORY (INHALATION) at 00:54

## 2018-01-01 RX ADMIN — PROPOFOL 35 MCG/KG/MIN: 10 INJECTION, EMULSION INTRAVENOUS at 09:24

## 2018-01-01 RX ADMIN — Medication 100 MCG/HR: at 00:45

## 2018-01-01 RX ADMIN — METOROPROLOL TARTRATE 1.25 MG: 5 INJECTION, SOLUTION INTRAVENOUS at 23:16

## 2018-01-01 RX ADMIN — INSULIN HUMAN 15 UNITS: 100 INJECTION, SUSPENSION SUBCUTANEOUS at 13:27

## 2018-01-01 RX ADMIN — Medication 20 ML: at 12:00

## 2018-01-01 RX ADMIN — FLUCONAZOLE 400 MG: 2 INJECTION INTRAVENOUS at 09:40

## 2018-01-01 RX ADMIN — CHLORHEXIDINE GLUCONATE 15 ML: 1.2 RINSE ORAL at 21:00

## 2018-01-01 RX ADMIN — CALCIUM CHLORIDE, MAGNESIUM CHLORIDE, DEXTROSE MONOHYDRATE, LACTIC ACID, SODIUM CHLORIDE, SODIUM BICARBONATE AND POTASSIUM CHLORIDE 2000 ML/HR: 5.15; 2.03; 22; 5.4; 6.46; 3.09; .157 INJECTION INTRAVENOUS at 05:59

## 2018-01-01 RX ADMIN — EPOPROSTENOL 50 NG/KG/MIN: 0.5 INJECTION, POWDER, LYOPHILIZED, FOR SOLUTION INTRAVENOUS at 08:23

## 2018-01-01 RX ADMIN — INSULIN LISPRO 4 UNITS: 100 INJECTION, SOLUTION INTRAVENOUS; SUBCUTANEOUS at 17:39

## 2018-01-01 RX ADMIN — Medication 20 ML: at 13:27

## 2018-01-01 RX ADMIN — VANCOMYCIN HYDROCHLORIDE 1000 MG: 1 INJECTION, POWDER, LYOPHILIZED, FOR SOLUTION INTRAVENOUS at 20:18

## 2018-01-01 RX ADMIN — IPRATROPIUM BROMIDE AND ALBUTEROL SULFATE 3 ML: .5; 3 SOLUTION RESPIRATORY (INHALATION) at 07:34

## 2018-01-01 RX ADMIN — Medication 10 ML: at 23:16

## 2018-01-01 RX ADMIN — IPRATROPIUM BROMIDE AND ALBUTEROL SULFATE 3 ML: .5; 3 SOLUTION RESPIRATORY (INHALATION) at 07:35

## 2018-01-01 RX ADMIN — INSULIN HUMAN 15 UNITS: 100 INJECTION, SUSPENSION SUBCUTANEOUS at 21:22

## 2018-01-01 RX ADMIN — Medication 100 MCG/HR: at 10:48

## 2018-01-01 RX ADMIN — FLUCONAZOLE 200 MG: 2 INJECTION INTRAVENOUS at 08:58

## 2018-01-01 RX ADMIN — WATER: 1000 INJECTION, SOLUTION INTRAVENOUS at 16:53

## 2018-01-01 RX ADMIN — CALCIUM CHLORIDE, MAGNESIUM CHLORIDE, DEXTROSE MONOHYDRATE, LACTIC ACID, SODIUM CHLORIDE, SODIUM BICARBONATE AND POTASSIUM CHLORIDE 2000 ML/HR: 5.15; 2.03; 22; 5.4; 6.46; 3.09; .157 INJECTION INTRAVENOUS at 10:10

## 2018-01-01 RX ADMIN — CALCIUM CHLORIDE, MAGNESIUM CHLORIDE, DEXTROSE MONOHYDRATE, LACTIC ACID, SODIUM CHLORIDE, SODIUM BICARBONATE AND POTASSIUM CHLORIDE 2000 ML/HR: 5.15; 2.03; 22; 5.4; 6.46; 3.09; .157 INJECTION INTRAVENOUS at 04:38

## 2018-01-01 RX ADMIN — PROPOFOL 40 MCG/KG/MIN: 10 INJECTION, EMULSION INTRAVENOUS at 08:47

## 2018-01-01 RX ADMIN — METHYLPREDNISOLONE SODIUM SUCCINATE 80 MG: 40 INJECTION, POWDER, FOR SOLUTION INTRAMUSCULAR; INTRAVENOUS at 23:33

## 2018-01-01 RX ADMIN — FENTANYL CITRATE 50 MCG: 50 INJECTION, SOLUTION INTRAMUSCULAR; INTRAVENOUS at 22:44

## 2018-01-01 RX ADMIN — CALCIUM CHLORIDE, MAGNESIUM CHLORIDE, DEXTROSE MONOHYDRATE, LACTIC ACID, SODIUM CHLORIDE, SODIUM BICARBONATE AND POTASSIUM CHLORIDE 2000 ML/HR: 5.15; 2.03; 22; 5.4; 6.46; 3.09; .157 INJECTION INTRAVENOUS at 05:05

## 2018-01-01 RX ADMIN — EPOPROSTENOL 50 NG/KG/MIN: 0.5 INJECTION, POWDER, LYOPHILIZED, FOR SOLUTION INTRAVENOUS at 20:19

## 2018-01-01 RX ADMIN — IPRATROPIUM BROMIDE AND ALBUTEROL SULFATE 3 ML: .5; 3 SOLUTION RESPIRATORY (INHALATION) at 09:29

## 2018-01-01 RX ADMIN — MUPIROCIN CALCIUM: 20 CREAM TOPICAL at 18:41

## 2018-01-01 RX ADMIN — SODIUM BICARBONATE: 84 INJECTION, SOLUTION INTRAVENOUS at 07:21

## 2018-01-01 RX ADMIN — SODIUM BICARBONATE: 84 INJECTION, SOLUTION INTRAVENOUS at 09:39

## 2018-01-01 RX ADMIN — IPRATROPIUM BROMIDE AND ALBUTEROL SULFATE 3 ML: .5; 3 SOLUTION RESPIRATORY (INHALATION) at 23:32

## 2018-01-01 RX ADMIN — IPRATROPIUM BROMIDE AND ALBUTEROL SULFATE 3 ML: .5; 3 SOLUTION RESPIRATORY (INHALATION) at 03:04

## 2018-01-01 RX ADMIN — CEFEPIME 2 G: 2 INJECTION, POWDER, FOR SOLUTION INTRAMUSCULAR; INTRAVENOUS at 20:28

## 2018-01-01 RX ADMIN — METHYLPREDNISOLONE SODIUM SUCCINATE 40 MG: 40 INJECTION, POWDER, FOR SOLUTION INTRAMUSCULAR; INTRAVENOUS at 23:11

## 2018-01-01 RX ADMIN — Medication 20 ML: at 12:51

## 2018-01-01 RX ADMIN — SODIUM CHLORIDE 0.3 MCG/KG/HR: 900 INJECTION, SOLUTION INTRAVENOUS at 15:06

## 2018-01-01 RX ADMIN — IPRATROPIUM BROMIDE AND ALBUTEROL SULFATE 3 ML: .5; 3 SOLUTION RESPIRATORY (INHALATION) at 15:33

## 2018-01-01 RX ADMIN — INSULIN GLARGINE 5 UNITS: 100 INJECTION, SOLUTION SUBCUTANEOUS at 08:29

## 2018-01-01 RX ADMIN — CEFEPIME 2 G: 2 INJECTION, POWDER, FOR SOLUTION INTRAMUSCULAR; INTRAVENOUS at 00:46

## 2018-01-01 RX ADMIN — PROPOFOL 30 MCG/KG/MIN: 10 INJECTION, EMULSION INTRAVENOUS at 18:13

## 2018-01-01 RX ADMIN — Medication 10 ML: at 05:08

## 2018-01-01 RX ADMIN — AMIODARONE HYDROCHLORIDE 0.5 MG/MIN: 50 INJECTION, SOLUTION INTRAVENOUS at 01:40

## 2018-01-01 RX ADMIN — METHYLPREDNISOLONE SODIUM SUCCINATE 80 MG: 40 INJECTION, POWDER, FOR SOLUTION INTRAMUSCULAR; INTRAVENOUS at 06:32

## 2018-01-01 RX ADMIN — METHYLPREDNISOLONE SODIUM SUCCINATE 40 MG: 40 INJECTION, POWDER, FOR SOLUTION INTRAMUSCULAR; INTRAVENOUS at 23:08

## 2018-01-01 RX ADMIN — Medication 6 MCG/MIN: at 20:16

## 2018-01-01 RX ADMIN — WATER: 1000 INJECTION, SOLUTION INTRAVENOUS at 17:09

## 2018-01-01 RX ADMIN — FENTANYL CITRATE 100 MCG: 50 INJECTION, SOLUTION INTRAMUSCULAR; INTRAVENOUS at 21:05

## 2018-01-01 RX ADMIN — HEPARIN SODIUM AND DEXTROSE 7.3 UNITS/KG/HR: 10000; 5 INJECTION INTRAVENOUS at 23:08

## 2018-01-01 RX ADMIN — METHYLPREDNISOLONE SODIUM SUCCINATE 40 MG: 40 INJECTION, POWDER, FOR SOLUTION INTRAMUSCULAR; INTRAVENOUS at 12:41

## 2018-01-01 RX ADMIN — FLUCONAZOLE 400 MG: 2 INJECTION INTRAVENOUS at 09:29

## 2018-01-01 RX ADMIN — Medication 100 MCG/HR: at 19:34

## 2018-01-01 RX ADMIN — METHYLPREDNISOLONE SODIUM SUCCINATE 40 MG: 40 INJECTION, POWDER, FOR SOLUTION INTRAMUSCULAR; INTRAVENOUS at 23:23

## 2018-01-01 RX ADMIN — SODIUM CHLORIDE 22.4 ML/HR: 900 INJECTION, SOLUTION INTRAVENOUS at 00:18

## 2018-01-01 RX ADMIN — APIXABAN 2.5 MG: 2.5 TABLET, FILM COATED ORAL at 20:55

## 2018-01-01 RX ADMIN — Medication 150 MCG/HR: at 08:44

## 2018-01-01 RX ADMIN — AMIODARONE HYDROCHLORIDE 1 MG/MIN: 50 INJECTION, SOLUTION INTRAVENOUS at 12:36

## 2018-01-01 RX ADMIN — SODIUM CHLORIDE 22.4 ML/HR: 900 INJECTION, SOLUTION INTRAVENOUS at 00:06

## 2018-01-01 RX ADMIN — CEFEPIME 2 G: 2 INJECTION, POWDER, FOR SOLUTION INTRAMUSCULAR; INTRAVENOUS at 12:28

## 2018-01-01 RX ADMIN — Medication 100 MCG/HR: at 02:47

## 2018-01-01 RX ADMIN — INSULIN LISPRO 7 UNITS: 100 INJECTION, SOLUTION INTRAVENOUS; SUBCUTANEOUS at 12:39

## 2018-01-01 RX ADMIN — EPOPROSTENOL 50 NG/KG/MIN: 0.5 INJECTION, POWDER, LYOPHILIZED, FOR SOLUTION INTRAVENOUS at 05:08

## 2018-01-01 RX ADMIN — INSULIN LISPRO 4 UNITS: 100 INJECTION, SOLUTION INTRAVENOUS; SUBCUTANEOUS at 12:00

## 2018-01-01 RX ADMIN — CHLORHEXIDINE GLUCONATE 15 ML: 1.2 RINSE ORAL at 11:00

## 2018-01-01 RX ADMIN — CEFEPIME 2 G: 2 INJECTION, POWDER, FOR SOLUTION INTRAMUSCULAR; INTRAVENOUS at 00:07

## 2018-01-01 RX ADMIN — SODIUM BICARBONATE: 84 INJECTION, SOLUTION INTRAVENOUS at 01:31

## 2018-01-01 RX ADMIN — PROPOFOL 50 MCG/KG/MIN: 10 INJECTION, EMULSION INTRAVENOUS at 06:23

## 2018-01-01 RX ADMIN — VANCOMYCIN HYDROCHLORIDE 2000 MG: 10 INJECTION, POWDER, LYOPHILIZED, FOR SOLUTION INTRAVENOUS at 09:38

## 2018-01-01 RX ADMIN — BISACODYL 10 MG: 10 SUPPOSITORY RECTAL at 12:13

## 2018-01-01 RX ADMIN — Medication 10 ML: at 06:13

## 2018-01-01 RX ADMIN — ASCORBIC ACID: 500 INJECTION, SOLUTION INTRAMUSCULAR; INTRAVENOUS; SUBCUTANEOUS at 22:38

## 2018-01-01 RX ADMIN — Medication: at 09:25

## 2018-01-01 RX ADMIN — CALCIUM CHLORIDE, MAGNESIUM CHLORIDE, DEXTROSE MONOHYDRATE, LACTIC ACID, SODIUM CHLORIDE, SODIUM BICARBONATE AND POTASSIUM CHLORIDE 2000 ML/HR: 5.15; 2.03; 22; 5.4; 6.46; 3.09; .157 INJECTION INTRAVENOUS at 18:56

## 2018-01-01 RX ADMIN — CEFEPIME 2 G: 2 INJECTION, POWDER, FOR SOLUTION INTRAMUSCULAR; INTRAVENOUS at 01:30

## 2018-01-01 RX ADMIN — METRONIDAZOLE 500 MG: 500 INJECTION, SOLUTION INTRAVENOUS at 16:40

## 2018-01-01 RX ADMIN — Medication: at 18:30

## 2018-01-01 RX ADMIN — PROPOFOL 30 MCG/KG/MIN: 10 INJECTION, EMULSION INTRAVENOUS at 03:16

## 2018-01-01 RX ADMIN — NOREPINEPHRINE BITARTRATE 5 MCG/MIN: 1 INJECTION INTRAVENOUS at 23:45

## 2018-01-01 RX ADMIN — CHLORHEXIDINE GLUCONATE 15 ML: 1.2 RINSE ORAL at 20:08

## 2018-01-01 RX ADMIN — HEPARIN SODIUM 4000 UNITS: 5000 INJECTION, SOLUTION INTRAVENOUS; SUBCUTANEOUS at 15:21

## 2018-01-01 RX ADMIN — IPRATROPIUM BROMIDE AND ALBUTEROL SULFATE 3 ML: .5; 3 SOLUTION RESPIRATORY (INHALATION) at 21:30

## 2018-01-01 RX ADMIN — METOPROLOL TARTRATE 1.25 MG: 5 INJECTION, SOLUTION INTRAVENOUS at 12:19

## 2018-01-01 RX ADMIN — Medication 100 MCG/HR: at 11:07

## 2018-01-01 RX ADMIN — METHYLPREDNISOLONE SODIUM SUCCINATE 40 MG: 40 INJECTION, POWDER, FOR SOLUTION INTRAMUSCULAR; INTRAVENOUS at 17:31

## 2018-01-01 RX ADMIN — HYDROCORTISONE SODIUM SUCCINATE 50 MG: 100 INJECTION, POWDER, FOR SOLUTION INTRAMUSCULAR; INTRAVENOUS at 06:32

## 2018-01-01 RX ADMIN — PROPOFOL 40 MCG/KG/MIN: 10 INJECTION, EMULSION INTRAVENOUS at 16:52

## 2018-01-01 RX ADMIN — EPOPROSTENOL 50 NG/KG/MIN: 0.5 INJECTION, POWDER, LYOPHILIZED, FOR SOLUTION INTRAVENOUS at 20:28

## 2018-01-01 RX ADMIN — VECURONIUM BROMIDE: 1 INJECTION, POWDER, LYOPHILIZED, FOR SOLUTION INTRAVENOUS at 14:39

## 2018-01-01 RX ADMIN — SODIUM CHLORIDE 60 ML/HR: 900 INJECTION, SOLUTION INTRAVENOUS at 17:14

## 2018-01-01 RX ADMIN — PROPOFOL 30 MCG/KG/MIN: 10 INJECTION, EMULSION INTRAVENOUS at 04:26

## 2018-01-01 RX ADMIN — EPOPROSTENOL 50 NG/KG/MIN: 0.5 INJECTION, POWDER, LYOPHILIZED, FOR SOLUTION INTRAVENOUS at 18:22

## 2018-01-01 RX ADMIN — EPOPROSTENOL 50 NG/KG/MIN: 0.5 INJECTION, POWDER, LYOPHILIZED, FOR SOLUTION INTRAVENOUS at 11:52

## 2018-01-01 RX ADMIN — SODIUM BICARBONATE: 84 INJECTION, SOLUTION INTRAVENOUS at 20:23

## 2018-01-01 RX ADMIN — METOPROLOL TARTRATE 1.25 MG: 5 INJECTION, SOLUTION INTRAVENOUS at 12:28

## 2018-01-01 RX ADMIN — INSULIN HUMAN 15 UNITS: 100 INJECTION, SUSPENSION SUBCUTANEOUS at 14:19

## 2018-01-01 RX ADMIN — CEFEPIME HYDROCHLORIDE 2 G: 2 INJECTION, POWDER, FOR SOLUTION INTRAVENOUS at 23:10

## 2018-01-01 RX ADMIN — IPRATROPIUM BROMIDE AND ALBUTEROL SULFATE 3 ML: .5; 3 SOLUTION RESPIRATORY (INHALATION) at 20:03

## 2018-01-01 RX ADMIN — DILTIAZEM HYDROCHLORIDE 5 MG/HR: 5 INJECTION, SOLUTION INTRAVENOUS at 05:19

## 2018-01-01 RX ADMIN — Medication 100 MCG/HR: at 02:07

## 2018-01-01 RX ADMIN — PROPOFOL 50 MCG/KG/MIN: 10 INJECTION, EMULSION INTRAVENOUS at 01:35

## 2018-01-01 RX ADMIN — INSULIN LISPRO 3 UNITS: 100 INJECTION, SOLUTION INTRAVENOUS; SUBCUTANEOUS at 05:27

## 2018-01-01 RX ADMIN — INSULIN LISPRO 4 UNITS: 100 INJECTION, SOLUTION INTRAVENOUS; SUBCUTANEOUS at 12:32

## 2018-01-01 RX ADMIN — CEFEPIME HYDROCHLORIDE 2 G: 2 INJECTION, POWDER, FOR SOLUTION INTRAVENOUS at 00:02

## 2018-01-01 RX ADMIN — CHLORHEXIDINE GLUCONATE 15 ML: 1.2 RINSE ORAL at 21:07

## 2018-01-01 RX ADMIN — IPRATROPIUM BROMIDE AND ALBUTEROL SULFATE 3 ML: .5; 3 SOLUTION RESPIRATORY (INHALATION) at 19:02

## 2018-01-01 RX ADMIN — Medication 10 ML: at 13:42

## 2018-01-01 RX ADMIN — CALCIUM CHLORIDE, MAGNESIUM CHLORIDE, DEXTROSE MONOHYDRATE, LACTIC ACID, SODIUM CHLORIDE, SODIUM BICARBONATE AND POTASSIUM CHLORIDE 2000 ML/HR: 5.15; 2.03; 22; 5.4; 6.46; 3.09; .157 INJECTION INTRAVENOUS at 16:21

## 2018-01-01 RX ADMIN — FAMOTIDINE 20 MG: 10 INJECTION, SOLUTION INTRAVENOUS at 11:00

## 2018-01-01 RX ADMIN — Medication 100 MCG/HR: at 04:52

## 2018-01-01 RX ADMIN — METRONIDAZOLE 500 MG: 500 INJECTION, SOLUTION INTRAVENOUS at 08:26

## 2018-01-01 RX ADMIN — AMIODARONE HYDROCHLORIDE 0.5 MG/MIN: 50 INJECTION, SOLUTION INTRAVENOUS at 18:09

## 2018-01-01 RX ADMIN — INSULIN LISPRO 3 UNITS: 100 INJECTION, SOLUTION INTRAVENOUS; SUBCUTANEOUS at 05:54

## 2018-01-01 RX ADMIN — CALCIUM CHLORIDE, MAGNESIUM CHLORIDE, DEXTROSE MONOHYDRATE, LACTIC ACID, SODIUM CHLORIDE, SODIUM BICARBONATE AND POTASSIUM CHLORIDE 2000 ML/HR: 5.15; 2.03; 22; 5.4; 6.46; 3.09; .157 INJECTION INTRAVENOUS at 21:31

## 2018-01-01 RX ADMIN — SODIUM BICARBONATE: 84 INJECTION, SOLUTION INTRAVENOUS at 12:34

## 2018-01-01 RX ADMIN — IPRATROPIUM BROMIDE AND ALBUTEROL SULFATE 3 ML: .5; 3 SOLUTION RESPIRATORY (INHALATION) at 11:34

## 2018-01-01 RX ADMIN — Medication 10 ML: at 22:24

## 2018-01-01 RX ADMIN — METOPROLOL TARTRATE 1.25 MG: 5 INJECTION, SOLUTION INTRAVENOUS at 09:00

## 2018-01-01 RX ADMIN — INSULIN GLARGINE 5 UNITS: 100 INJECTION, SOLUTION SUBCUTANEOUS at 08:46

## 2018-01-01 RX ADMIN — EPOPROSTENOL 50 NG/KG/MIN: 0.5 INJECTION, POWDER, LYOPHILIZED, FOR SOLUTION INTRAVENOUS at 11:59

## 2018-01-01 RX ADMIN — METRONIDAZOLE 500 MG: 500 INJECTION, SOLUTION INTRAVENOUS at 20:09

## 2018-01-01 RX ADMIN — HEPARIN SODIUM IN SODIUM CHLORIDE 200 UNITS: 200 INJECTION INTRAVENOUS at 13:10

## 2018-01-01 RX ADMIN — IPRATROPIUM BROMIDE AND ALBUTEROL SULFATE 3 ML: .5; 3 SOLUTION RESPIRATORY (INHALATION) at 23:01

## 2018-01-01 RX ADMIN — METHYLPREDNISOLONE SODIUM SUCCINATE 40 MG: 40 INJECTION, POWDER, FOR SOLUTION INTRAMUSCULAR; INTRAVENOUS at 11:41

## 2018-01-01 RX ADMIN — FAMOTIDINE 20 MG: 10 INJECTION, SOLUTION INTRAVENOUS at 08:35

## 2018-01-01 RX ADMIN — BUDESONIDE 500 MCG: 0.5 SUSPENSION RESPIRATORY (INHALATION) at 02:33

## 2018-01-01 RX ADMIN — POLYETHYLENE GLYCOL 3350 17 G: 17 POWDER, FOR SOLUTION ORAL at 08:35

## 2018-01-01 RX ADMIN — CALCIUM CHLORIDE, MAGNESIUM CHLORIDE, DEXTROSE MONOHYDRATE, LACTIC ACID, SODIUM CHLORIDE, SODIUM BICARBONATE AND POTASSIUM CHLORIDE 2000 ML/HR: 5.15; 2.03; 22; 5.4; 6.46; 3.09; .157 INJECTION INTRAVENOUS at 13:00

## 2018-01-01 RX ADMIN — IPRATROPIUM BROMIDE AND ALBUTEROL SULFATE 3 ML: .5; 3 SOLUTION RESPIRATORY (INHALATION) at 04:16

## 2018-01-01 RX ADMIN — CHLORHEXIDINE GLUCONATE 15 ML: 1.2 RINSE ORAL at 20:03

## 2018-01-01 RX ADMIN — Medication 15 MCG/MIN: at 09:41

## 2018-01-01 RX ADMIN — AMIODARONE HYDROCHLORIDE 1 MG/MIN: 50 INJECTION, SOLUTION INTRAVENOUS at 13:18

## 2018-01-01 RX ADMIN — PROPOFOL 30 MCG/KG/MIN: 10 INJECTION, EMULSION INTRAVENOUS at 00:05

## 2018-01-01 RX ADMIN — SODIUM BICARBONATE 50 MEQ: 84 INJECTION, SOLUTION INTRAVENOUS at 16:40

## 2018-01-01 RX ADMIN — VANCOMYCIN HYDROCHLORIDE 1250 MG: 10 INJECTION, POWDER, LYOPHILIZED, FOR SOLUTION INTRAVENOUS at 20:07

## 2018-01-01 RX ADMIN — FAMOTIDINE 20 MG: 10 INJECTION, SOLUTION INTRAVENOUS at 08:25

## 2018-01-01 RX ADMIN — PROPOFOL 50 MCG/KG/MIN: 10 INJECTION, EMULSION INTRAVENOUS at 03:30

## 2018-01-01 RX ADMIN — IPRATROPIUM BROMIDE AND ALBUTEROL SULFATE 3 ML: .5; 3 SOLUTION RESPIRATORY (INHALATION) at 14:53

## 2018-01-01 RX ADMIN — EPOPROSTENOL 50 NG/KG/MIN: 0.5 INJECTION, POWDER, LYOPHILIZED, FOR SOLUTION INTRAVENOUS at 14:27

## 2018-01-01 RX ADMIN — CHLORHEXIDINE GLUCONATE 15 ML: 1.2 RINSE ORAL at 21:04

## 2018-01-01 RX ADMIN — METHYLPREDNISOLONE SODIUM SUCCINATE 80 MG: 40 INJECTION, POWDER, FOR SOLUTION INTRAMUSCULAR; INTRAVENOUS at 06:14

## 2018-01-01 RX ADMIN — FAMOTIDINE 20 MG: 10 INJECTION, SOLUTION INTRAVENOUS at 08:30

## 2018-01-01 RX ADMIN — METOCLOPRAMIDE 5 MG: 5 INJECTION, SOLUTION INTRAMUSCULAR; INTRAVENOUS at 11:41

## 2018-01-01 RX ADMIN — SODIUM CHLORIDE 0.4 MCG/KG/HR: 900 INJECTION, SOLUTION INTRAVENOUS at 09:31

## 2018-01-01 RX ADMIN — Medication 200 MCG/HR: at 05:50

## 2018-01-01 RX ADMIN — CHLORHEXIDINE GLUCONATE 15 ML: 1.2 RINSE ORAL at 08:29

## 2018-01-01 RX ADMIN — AMIODARONE HYDROCHLORIDE 1 MG/MIN: 50 INJECTION, SOLUTION INTRAVENOUS at 04:18

## 2018-01-01 RX ADMIN — METRONIDAZOLE 500 MG: 500 INJECTION, SOLUTION INTRAVENOUS at 08:29

## 2018-01-01 RX ADMIN — METHYLPREDNISOLONE SODIUM SUCCINATE 40 MG: 40 INJECTION, POWDER, FOR SOLUTION INTRAMUSCULAR; INTRAVENOUS at 05:03

## 2018-01-01 RX ADMIN — METRONIDAZOLE 500 MG: 500 INJECTION, SOLUTION INTRAVENOUS at 09:22

## 2018-01-01 RX ADMIN — BUMETANIDE 2 MG: 0.25 INJECTION, SOLUTION INTRAMUSCULAR; INTRAVENOUS at 23:32

## 2018-01-01 RX ADMIN — PROPOFOL 40 MCG/KG/MIN: 10 INJECTION, EMULSION INTRAVENOUS at 17:52

## 2018-01-01 RX ADMIN — LEVALBUTEROL HYDROCHLORIDE 1.25 MG: 1.25 SOLUTION RESPIRATORY (INHALATION) at 00:35

## 2018-01-01 RX ADMIN — METRONIDAZOLE 500 MG: 500 INJECTION, SOLUTION INTRAVENOUS at 08:20

## 2018-01-01 RX ADMIN — INSULIN HUMAN 8 UNITS: 100 INJECTION, SUSPENSION SUBCUTANEOUS at 14:17

## 2018-01-01 RX ADMIN — IPRATROPIUM BROMIDE AND ALBUTEROL SULFATE 3 ML: .5; 3 SOLUTION RESPIRATORY (INHALATION) at 03:16

## 2018-01-01 RX ADMIN — Medication 10 ML: at 14:00

## 2018-01-01 RX ADMIN — METRONIDAZOLE 500 MG: 500 INJECTION, SOLUTION INTRAVENOUS at 21:51

## 2018-01-01 RX ADMIN — INSULIN LISPRO 3 UNITS: 100 INJECTION, SOLUTION INTRAVENOUS; SUBCUTANEOUS at 17:59

## 2018-01-01 RX ADMIN — Medication 10 ML: at 04:57

## 2018-01-01 RX ADMIN — LORAZEPAM 2 MG: 2 INJECTION INTRAMUSCULAR; INTRAVENOUS at 09:35

## 2018-01-01 RX ADMIN — METRONIDAZOLE 500 MG: 500 INJECTION, SOLUTION INTRAVENOUS at 08:28

## 2018-01-01 RX ADMIN — CEFEPIME HYDROCHLORIDE 2 G: 2 INJECTION, POWDER, FOR SOLUTION INTRAVENOUS at 12:00

## 2018-01-01 RX ADMIN — METHYLPREDNISOLONE SODIUM SUCCINATE 40 MG: 40 INJECTION, POWDER, FOR SOLUTION INTRAMUSCULAR; INTRAVENOUS at 05:39

## 2018-01-01 RX ADMIN — PROPOFOL 30 MCG/KG/MIN: 10 INJECTION, EMULSION INTRAVENOUS at 20:45

## 2018-01-01 RX ADMIN — SODIUM CHLORIDE 2178 ML: 900 INJECTION, SOLUTION INTRAVENOUS at 09:30

## 2018-01-01 RX ADMIN — SODIUM CHLORIDE 0.4 MCG/KG/HR: 900 INJECTION, SOLUTION INTRAVENOUS at 22:24

## 2018-01-01 RX ADMIN — HYDROCORTISONE SODIUM SUCCINATE 50 MG: 100 INJECTION, POWDER, FOR SOLUTION INTRAMUSCULAR; INTRAVENOUS at 21:22

## 2018-01-01 RX ADMIN — CEFEPIME 2 G: 2 INJECTION, POWDER, FOR SOLUTION INTRAMUSCULAR; INTRAVENOUS at 00:18

## 2018-01-01 RX ADMIN — METOCLOPRAMIDE 5 MG: 5 INJECTION, SOLUTION INTRAMUSCULAR; INTRAVENOUS at 17:30

## 2018-01-01 RX ADMIN — CALCIUM CHLORIDE, MAGNESIUM CHLORIDE, DEXTROSE MONOHYDRATE, LACTIC ACID, SODIUM CHLORIDE, SODIUM BICARBONATE AND POTASSIUM CHLORIDE 2000 ML/HR: 5.15; 2.03; 22; 5.4; 6.46; 3.09; .157 INJECTION INTRAVENOUS at 20:44

## 2018-01-01 RX ADMIN — Medication: at 08:59

## 2018-01-01 RX ADMIN — FLUCONAZOLE 400 MG: 2 INJECTION INTRAVENOUS at 09:24

## 2018-01-01 RX ADMIN — CALCIUM CHLORIDE, MAGNESIUM CHLORIDE, DEXTROSE MONOHYDRATE, LACTIC ACID, SODIUM CHLORIDE, SODIUM BICARBONATE AND POTASSIUM CHLORIDE 2000 ML/HR: 5.15; 2.03; 22; 5.4; 6.46; 3.09; .157 INJECTION INTRAVENOUS at 22:05

## 2018-01-01 RX ADMIN — METOCLOPRAMIDE 5 MG: 5 INJECTION, SOLUTION INTRAMUSCULAR; INTRAVENOUS at 05:54

## 2018-01-01 RX ADMIN — CHLORHEXIDINE GLUCONATE 15 ML: 1.2 RINSE ORAL at 20:41

## 2018-01-01 RX ADMIN — METOCLOPRAMIDE 5 MG: 5 INJECTION, SOLUTION INTRAMUSCULAR; INTRAVENOUS at 23:24

## 2018-01-01 RX ADMIN — CALCIUM CHLORIDE, MAGNESIUM CHLORIDE, DEXTROSE MONOHYDRATE, LACTIC ACID, SODIUM CHLORIDE, SODIUM BICARBONATE AND POTASSIUM CHLORIDE 2000 ML/HR: 5.15; 2.03; 22; 5.4; 6.46; 3.09; .157 INJECTION INTRAVENOUS at 23:18

## 2018-01-01 RX ADMIN — IPRATROPIUM BROMIDE AND ALBUTEROL SULFATE 3 ML: .5; 3 SOLUTION RESPIRATORY (INHALATION) at 12:04

## 2018-01-01 RX ADMIN — INSULIN GLARGINE 5 UNITS: 100 INJECTION, SOLUTION SUBCUTANEOUS at 12:32

## 2018-01-01 RX ADMIN — SODIUM CHLORIDE 22.4 ML/HR: 900 INJECTION, SOLUTION INTRAVENOUS at 13:56

## 2018-01-01 RX ADMIN — PROPOFOL 35 MCG/KG/MIN: 10 INJECTION, EMULSION INTRAVENOUS at 13:54

## 2018-01-01 RX ADMIN — EPOPROSTENOL 50 NG/KG/MIN: 0.5 INJECTION, POWDER, LYOPHILIZED, FOR SOLUTION INTRAVENOUS at 03:23

## 2018-01-01 RX ADMIN — Medication: at 08:33

## 2018-01-01 RX ADMIN — INSULIN LISPRO 3 UNITS: 100 INJECTION, SOLUTION INTRAVENOUS; SUBCUTANEOUS at 05:18

## 2018-01-01 RX ADMIN — CEFEPIME 2 G: 2 INJECTION, POWDER, FOR SOLUTION INTRAMUSCULAR; INTRAVENOUS at 13:19

## 2018-01-01 RX ADMIN — CHLORHEXIDINE GLUCONATE 15 ML: 1.2 RINSE ORAL at 08:35

## 2018-01-01 RX ADMIN — CALCIUM CHLORIDE, MAGNESIUM CHLORIDE, DEXTROSE MONOHYDRATE, LACTIC ACID, SODIUM CHLORIDE, SODIUM BICARBONATE AND POTASSIUM CHLORIDE 2000 ML/HR: 5.15; 2.03; 22; 5.4; 6.46; 3.09; .157 INJECTION INTRAVENOUS at 17:10

## 2018-01-01 RX ADMIN — WATER: 1000 INJECTION, SOLUTION INTRAVENOUS at 14:35

## 2018-01-01 RX ADMIN — SODIUM CHLORIDE 22.4 ML/HR: 900 INJECTION, SOLUTION INTRAVENOUS at 00:33

## 2018-01-01 RX ADMIN — MIDAZOLAM HYDROCHLORIDE 5 MG: 5 INJECTION INTRAMUSCULAR; INTRAVENOUS at 07:50

## 2018-01-01 RX ADMIN — CHLORHEXIDINE GLUCONATE 15 ML: 1.2 RINSE ORAL at 09:48

## 2018-01-01 RX ADMIN — IPRATROPIUM BROMIDE AND ALBUTEROL SULFATE 3 ML: .5; 3 SOLUTION RESPIRATORY (INHALATION) at 08:37

## 2018-01-01 RX ADMIN — METHYLPREDNISOLONE SODIUM SUCCINATE 80 MG: 40 INJECTION, POWDER, FOR SOLUTION INTRAMUSCULAR; INTRAVENOUS at 17:49

## 2018-01-01 RX ADMIN — INSULIN LISPRO 3 UNITS: 100 INJECTION, SOLUTION INTRAVENOUS; SUBCUTANEOUS at 17:30

## 2018-01-01 RX ADMIN — LEVOFLOXACIN 750 MG: 5 INJECTION, SOLUTION INTRAVENOUS at 13:26

## 2018-01-01 RX ADMIN — IPRATROPIUM BROMIDE AND ALBUTEROL SULFATE 3 ML: .5; 3 SOLUTION RESPIRATORY (INHALATION) at 15:36

## 2018-01-01 RX ADMIN — Medication 10 ML: at 13:27

## 2018-01-01 RX ADMIN — AMIODARONE HYDROCHLORIDE 0.5 MG/MIN: 50 INJECTION, SOLUTION INTRAVENOUS at 08:36

## 2018-01-01 RX ADMIN — FAMOTIDINE 20 MG: 10 INJECTION, SOLUTION INTRAVENOUS at 20:56

## 2018-01-01 RX ADMIN — CHLORHEXIDINE GLUCONATE 15 ML: 1.2 RINSE ORAL at 09:00

## 2018-01-01 RX ADMIN — METRONIDAZOLE 500 MG: 500 INJECTION, SOLUTION INTRAVENOUS at 21:22

## 2018-01-01 RX ADMIN — IPRATROPIUM BROMIDE AND ALBUTEROL SULFATE 3 ML: .5; 3 SOLUTION RESPIRATORY (INHALATION) at 02:33

## 2018-01-01 RX ADMIN — CALCIUM CHLORIDE, MAGNESIUM CHLORIDE, DEXTROSE MONOHYDRATE, LACTIC ACID, SODIUM CHLORIDE, SODIUM BICARBONATE AND POTASSIUM CHLORIDE 2000 ML/HR: 5.15; 2.03; 22; 5.4; 6.46; 3.09; .157 INJECTION INTRAVENOUS at 12:54

## 2018-01-01 RX ADMIN — AMIODARONE HYDROCHLORIDE 0.5 MG/MIN: 50 INJECTION, SOLUTION INTRAVENOUS at 21:30

## 2018-01-01 RX ADMIN — Medication 20 ML: at 12:37

## 2018-01-01 RX ADMIN — FLUCONAZOLE 400 MG: 2 INJECTION INTRAVENOUS at 09:37

## 2018-01-01 RX ADMIN — IPRATROPIUM BROMIDE AND ALBUTEROL SULFATE 3 ML: .5; 3 SOLUTION RESPIRATORY (INHALATION) at 07:24

## 2018-01-01 RX ADMIN — FLUCONAZOLE 200 MG: 2 INJECTION INTRAVENOUS at 08:32

## 2018-01-01 RX ADMIN — EPOPROSTENOL 50 NG/KG/MIN: 0.5 INJECTION, POWDER, LYOPHILIZED, FOR SOLUTION INTRAVENOUS at 03:14

## 2018-01-01 RX ADMIN — METRONIDAZOLE 500 MG: 500 INJECTION, SOLUTION INTRAVENOUS at 08:36

## 2018-01-01 RX ADMIN — BUMETANIDE 2 MG: 0.25 INJECTION, SOLUTION INTRAMUSCULAR; INTRAVENOUS at 22:58

## 2018-01-01 RX ADMIN — ENOXAPARIN SODIUM 90 MG: 30 INJECTION SUBCUTANEOUS at 06:00

## 2018-01-01 RX ADMIN — METHYLPREDNISOLONE SODIUM SUCCINATE 40 MG: 40 INJECTION, POWDER, FOR SOLUTION INTRAMUSCULAR; INTRAVENOUS at 12:00

## 2018-01-01 RX ADMIN — SODIUM CHLORIDE, PRESERVATIVE FREE 500 UNITS: 5 INJECTION INTRAVENOUS at 13:10

## 2018-01-01 RX ADMIN — MUPIROCIN CALCIUM: 20 CREAM TOPICAL at 09:00

## 2018-01-01 RX ADMIN — INSULIN HUMAN 8 UNITS: 100 INJECTION, SUSPENSION SUBCUTANEOUS at 21:58

## 2018-01-01 RX ADMIN — METHYLPREDNISOLONE SODIUM SUCCINATE 40 MG: 40 INJECTION, POWDER, FOR SOLUTION INTRAMUSCULAR; INTRAVENOUS at 18:00

## 2018-01-01 RX ADMIN — WATER: 1000 INJECTION, SOLUTION INTRAVENOUS at 09:32

## 2018-01-01 RX ADMIN — METRONIDAZOLE 500 MG: 500 INJECTION, SOLUTION INTRAVENOUS at 20:29

## 2018-01-01 RX ADMIN — SODIUM CHLORIDE 75 ML/HR: 900 INJECTION, SOLUTION INTRAVENOUS at 08:02

## 2018-01-01 RX ADMIN — HEPARIN SODIUM AND DEXTROSE 8.3 UNITS/KG/HR: 10000; 5 INJECTION INTRAVENOUS at 03:15

## 2018-01-01 RX ADMIN — PROPOFOL 50 MCG/KG/MIN: 10 INJECTION, EMULSION INTRAVENOUS at 23:53

## 2018-01-01 RX ADMIN — CEFEPIME 2 G: 2 INJECTION, POWDER, FOR SOLUTION INTRAMUSCULAR; INTRAVENOUS at 00:01

## 2018-01-01 RX ADMIN — Medication 10 ML: at 20:42

## 2018-01-01 RX ADMIN — MUPIROCIN CALCIUM: 20 CREAM TOPICAL at 17:49

## 2018-01-01 RX ADMIN — FAMOTIDINE 20 MG: 10 INJECTION, SOLUTION INTRAVENOUS at 20:29

## 2018-01-01 RX ADMIN — Medication 10 ML: at 05:54

## 2018-01-01 RX ADMIN — ETOMIDATE 20 MG: 2 INJECTION, SOLUTION INTRAVENOUS at 06:12

## 2018-01-01 RX ADMIN — HYDROCORTISONE SODIUM SUCCINATE 50 MG: 100 INJECTION, POWDER, FOR SOLUTION INTRAMUSCULAR; INTRAVENOUS at 22:06

## 2018-01-01 RX ADMIN — DEXTROSE MONOHYDRATE 25 G: 25 INJECTION, SOLUTION INTRAVENOUS at 06:12

## 2018-01-01 RX ADMIN — METOCLOPRAMIDE 5 MG: 5 INJECTION, SOLUTION INTRAMUSCULAR; INTRAVENOUS at 05:07

## 2018-01-01 RX ADMIN — INSULIN LISPRO 7 UNITS: 100 INJECTION, SOLUTION INTRAVENOUS; SUBCUTANEOUS at 23:23

## 2018-01-01 RX ADMIN — FLUCONAZOLE 400 MG: 2 INJECTION INTRAVENOUS at 10:12

## 2018-01-01 RX ADMIN — Medication 10 ML: at 05:41

## 2018-01-01 RX ADMIN — ONDANSETRON HYDROCHLORIDE 4 MG: 2 INJECTION, SOLUTION INTRAMUSCULAR; INTRAVENOUS at 02:31

## 2018-01-01 RX ADMIN — FAMOTIDINE 20 MG: 10 INJECTION, SOLUTION INTRAVENOUS at 08:23

## 2018-01-01 RX ADMIN — EPOPROSTENOL 50 NG/KG/MIN: 0.5 INJECTION, POWDER, LYOPHILIZED, FOR SOLUTION INTRAVENOUS at 15:42

## 2018-01-01 RX ADMIN — FLUCONAZOLE 400 MG: 400 INJECTION INTRAVENOUS at 04:18

## 2018-01-01 RX ADMIN — METHYLPREDNISOLONE SODIUM SUCCINATE 80 MG: 40 INJECTION, POWDER, FOR SOLUTION INTRAMUSCULAR; INTRAVENOUS at 00:18

## 2018-01-01 RX ADMIN — HYDROCORTISONE SODIUM SUCCINATE 50 MG: 100 INJECTION, POWDER, FOR SOLUTION INTRAMUSCULAR; INTRAVENOUS at 13:28

## 2018-01-01 RX ADMIN — INSULIN LISPRO 4 UNITS: 100 INJECTION, SOLUTION INTRAVENOUS; SUBCUTANEOUS at 11:54

## 2018-01-01 RX ADMIN — Medication 100 MCG/HR: at 13:55

## 2018-01-01 RX ADMIN — INSULIN HUMAN 8 UNITS: 100 INJECTION, SUSPENSION SUBCUTANEOUS at 05:44

## 2018-01-01 RX ADMIN — CHLORHEXIDINE GLUCONATE 15 ML: 1.2 RINSE ORAL at 08:25

## 2018-01-01 RX ADMIN — Medication 100 MCG/HR: at 23:21

## 2018-01-01 RX ADMIN — CEFEPIME HYDROCHLORIDE 2 G: 2 INJECTION, POWDER, FOR SOLUTION INTRAVENOUS at 10:14

## 2018-01-01 RX ADMIN — INSULIN LISPRO 3 UNITS: 100 INJECTION, SOLUTION INTRAVENOUS; SUBCUTANEOUS at 05:12

## 2018-01-01 RX ADMIN — INSULIN LISPRO 3 UNITS: 100 INJECTION, SOLUTION INTRAVENOUS; SUBCUTANEOUS at 23:33

## 2018-01-01 RX ADMIN — HEPARIN SODIUM 5000 UNITS: 5000 INJECTION, SOLUTION INTRAVENOUS; SUBCUTANEOUS at 06:58

## 2018-01-01 RX ADMIN — CALCIUM CHLORIDE, MAGNESIUM CHLORIDE, DEXTROSE MONOHYDRATE, LACTIC ACID, SODIUM CHLORIDE, SODIUM BICARBONATE AND POTASSIUM CHLORIDE 2000 ML/HR: 5.15; 2.03; 22; 5.4; 6.46; 3.09; .157 INJECTION INTRAVENOUS at 19:31

## 2018-01-01 RX ADMIN — INSULIN LISPRO 4 UNITS: 100 INJECTION, SOLUTION INTRAVENOUS; SUBCUTANEOUS at 05:38

## 2018-01-01 RX ADMIN — ASCORBIC ACID: 500 INJECTION, SOLUTION INTRAMUSCULAR; INTRAVENOUS; SUBCUTANEOUS at 01:30

## 2018-01-01 RX ADMIN — PROPOFOL 40 MCG/KG/MIN: 10 INJECTION, EMULSION INTRAVENOUS at 07:26

## 2018-01-01 RX ADMIN — METRONIDAZOLE 500 MG: 500 INJECTION, SOLUTION INTRAVENOUS at 20:35

## 2018-01-01 RX ADMIN — METOCLOPRAMIDE 5 MG: 5 INJECTION, SOLUTION INTRAMUSCULAR; INTRAVENOUS at 17:47

## 2018-01-01 RX ADMIN — Medication 10 ML: at 06:14

## 2018-01-01 RX ADMIN — IPRATROPIUM BROMIDE AND ALBUTEROL SULFATE 3 ML: .5; 3 SOLUTION RESPIRATORY (INHALATION) at 19:40

## 2018-01-01 RX ADMIN — CALCIUM CHLORIDE, MAGNESIUM CHLORIDE, DEXTROSE MONOHYDRATE, LACTIC ACID, SODIUM CHLORIDE, SODIUM BICARBONATE AND POTASSIUM CHLORIDE 2000 ML/HR: 5.15; 2.03; 22; 5.4; 6.46; 3.09; .157 INJECTION INTRAVENOUS at 00:07

## 2018-01-01 RX ADMIN — METHYLPREDNISOLONE SODIUM SUCCINATE 40 MG: 40 INJECTION, POWDER, FOR SOLUTION INTRAMUSCULAR; INTRAVENOUS at 05:54

## 2018-01-01 RX ADMIN — SODIUM CHLORIDE 500 ML: 900 INJECTION, SOLUTION INTRAVENOUS at 05:42

## 2018-01-01 RX ADMIN — SODIUM CHLORIDE 0.4 MCG/KG/HR: 900 INJECTION, SOLUTION INTRAVENOUS at 12:50

## 2018-01-01 RX ADMIN — Medication 100 MCG/HR: at 16:22

## 2018-01-01 RX ADMIN — PROPOFOL 35 MCG/KG/MIN: 10 INJECTION, EMULSION INTRAVENOUS at 20:13

## 2018-01-01 RX ADMIN — LEVOFLOXACIN 750 MG: 5 INJECTION, SOLUTION INTRAVENOUS at 12:36

## 2018-01-01 RX ADMIN — CALCIUM CHLORIDE, MAGNESIUM CHLORIDE, DEXTROSE MONOHYDRATE, LACTIC ACID, SODIUM CHLORIDE, SODIUM BICARBONATE AND POTASSIUM CHLORIDE 2000 ML/HR: 5.15; 2.03; 22; 5.4; 6.46; 3.09; .157 INJECTION INTRAVENOUS at 07:44

## 2018-01-01 RX ADMIN — AMIODARONE HYDROCHLORIDE 0.5 MG/MIN: 50 INJECTION, SOLUTION INTRAVENOUS at 15:37

## 2018-01-01 RX ADMIN — METHYLPREDNISOLONE SODIUM SUCCINATE 40 MG: 40 INJECTION, POWDER, FOR SOLUTION INTRAMUSCULAR; INTRAVENOUS at 23:24

## 2018-01-01 RX ADMIN — PROPOFOL 50 MCG/KG/MIN: 10 INJECTION, EMULSION INTRAVENOUS at 20:56

## 2018-01-01 RX ADMIN — IPRATROPIUM BROMIDE AND ALBUTEROL SULFATE 3 ML: .5; 3 SOLUTION RESPIRATORY (INHALATION) at 23:09

## 2018-01-01 RX ADMIN — EPOPROSTENOL 50 NG/KG/MIN: 0.5 INJECTION, POWDER, LYOPHILIZED, FOR SOLUTION INTRAVENOUS at 12:09

## 2018-01-01 RX ADMIN — AMIODARONE HYDROCHLORIDE 0.5 MG/MIN: 50 INJECTION, SOLUTION INTRAVENOUS at 22:41

## 2018-01-01 RX ADMIN — CALCIUM CHLORIDE, MAGNESIUM CHLORIDE, DEXTROSE MONOHYDRATE, LACTIC ACID, SODIUM CHLORIDE, SODIUM BICARBONATE AND POTASSIUM CHLORIDE 2000 ML/HR: 5.15; 2.03; 22; 5.4; 6.46; 3.09; .157 INJECTION INTRAVENOUS at 09:20

## 2018-01-01 RX ADMIN — PROPOFOL 35 MCG/KG/MIN: 10 INJECTION, EMULSION INTRAVENOUS at 15:31

## 2018-01-01 RX ADMIN — INSULIN GLARGINE 5 UNITS: 100 INJECTION, SOLUTION SUBCUTANEOUS at 08:58

## 2018-01-01 RX ADMIN — AMIODARONE HYDROCHLORIDE 1 MG/MIN: 50 INJECTION, SOLUTION INTRAVENOUS at 15:24

## 2018-01-01 RX ADMIN — ASCORBIC ACID: 500 INJECTION, SOLUTION INTRAMUSCULAR; INTRAVENOUS; SUBCUTANEOUS at 10:44

## 2018-01-01 RX ADMIN — FAMOTIDINE 20 MG: 10 INJECTION, SOLUTION INTRAVENOUS at 09:57

## 2018-01-01 RX ADMIN — VANCOMYCIN HYDROCHLORIDE 1500 MG: 10 INJECTION, POWDER, LYOPHILIZED, FOR SOLUTION INTRAVENOUS at 23:15

## 2018-01-01 RX ADMIN — METOROPROLOL TARTRATE 1.25 MG: 5 INJECTION, SOLUTION INTRAVENOUS at 18:40

## 2018-01-01 RX ADMIN — Medication 10 ML: at 13:18

## 2018-01-01 RX ADMIN — Medication 125 MCG/HR: at 19:12

## 2018-01-01 RX ADMIN — MUPIROCIN CALCIUM: 20 CREAM TOPICAL at 13:00

## 2018-01-01 RX ADMIN — Medication: at 23:33

## 2018-01-01 RX ADMIN — ENOXAPARIN SODIUM 90 MG: 60 INJECTION SUBCUTANEOUS at 06:06

## 2018-01-01 RX ADMIN — INSULIN LISPRO 5 UNITS: 100 INJECTION, SOLUTION INTRAVENOUS; SUBCUTANEOUS at 11:52

## 2018-01-01 RX ADMIN — HYDROCORTISONE SODIUM SUCCINATE 50 MG: 100 INJECTION, POWDER, FOR SOLUTION INTRAMUSCULAR; INTRAVENOUS at 14:19

## 2018-01-01 RX ADMIN — CEFEPIME 2 G: 2 INJECTION, POWDER, FOR SOLUTION INTRAMUSCULAR; INTRAVENOUS at 04:26

## 2018-01-01 RX ADMIN — FUROSEMIDE 40 MG: 10 INJECTION, SOLUTION INTRAMUSCULAR; INTRAVENOUS at 10:00

## 2018-01-01 RX ADMIN — PROPOFOL 30 MCG/KG/MIN: 10 INJECTION, EMULSION INTRAVENOUS at 05:07

## 2018-01-01 RX ADMIN — INSULIN HUMAN 15 UNITS: 100 INJECTION, SUSPENSION SUBCUTANEOUS at 05:41

## 2018-01-01 RX ADMIN — ASCORBIC ACID: 500 INJECTION, SOLUTION INTRAMUSCULAR; INTRAVENOUS; SUBCUTANEOUS at 15:39

## 2018-01-01 RX ADMIN — IPRATROPIUM BROMIDE AND ALBUTEROL SULFATE 3 ML: .5; 3 SOLUTION RESPIRATORY (INHALATION) at 08:03

## 2018-01-01 RX ADMIN — SUCCINYLCHOLINE CHLORIDE 100 MG: 20 INJECTION, SOLUTION INTRAMUSCULAR; INTRAVENOUS at 06:12

## 2018-01-01 RX ADMIN — Medication 16 MCG/MIN: at 01:19

## 2018-01-01 RX ADMIN — Medication 10 ML: at 21:08

## 2018-01-01 RX ADMIN — METRONIDAZOLE 500 MG: 500 INJECTION, SOLUTION INTRAVENOUS at 09:40

## 2018-01-01 RX ADMIN — Medication 20 ML: at 12:34

## 2018-01-01 RX ADMIN — FENTANYL CITRATE 100 MCG: 50 INJECTION, SOLUTION INTRAMUSCULAR; INTRAVENOUS at 16:12

## 2018-01-01 RX ADMIN — Medication 10 ML: at 05:19

## 2018-01-01 RX ADMIN — BUDESONIDE 500 MCG: 0.5 SUSPENSION RESPIRATORY (INHALATION) at 21:10

## 2018-01-01 RX ADMIN — METOCLOPRAMIDE 5 MG: 5 INJECTION, SOLUTION INTRAMUSCULAR; INTRAVENOUS at 12:01

## 2018-01-01 RX ADMIN — METRONIDAZOLE 500 MG: 500 INJECTION, SOLUTION INTRAVENOUS at 08:39

## 2018-01-01 RX ADMIN — METHYLPREDNISOLONE SODIUM SUCCINATE 40 MG: 40 INJECTION, POWDER, FOR SOLUTION INTRAMUSCULAR; INTRAVENOUS at 12:13

## 2018-01-01 RX ADMIN — FLUCONAZOLE 200 MG: 2 INJECTION INTRAVENOUS at 08:23

## 2018-01-01 RX ADMIN — Medication: at 08:31

## 2018-01-01 NOTE — PROGRESS NOTES
Results for Rica Paredes (MRN [id number]) as of 1/1/2018 17:46   Ref. Range 1/1/2018 17:33   pH Latest Ref Range: 7.35 - 7.45   7.23 (LL)   PCO2 Latest Ref Range: 35.0 - 45.0 mmHg 79 (H)   PO2 Latest Ref Range: 80 - 100 mmHg 71 (L)   BICARBONATE Latest Ref Range: 22 - 26 mmol/L 32 (H)   O2 SAT Latest Ref Range: 92 - 97 % 90 (L)   BASE EXCESS Latest Units: mmol/L 1.5   Sample source Latest Units:   ARTERIAL   SITE Latest Units:   RIGHT RADIAL   SYBIL'S TEST Latest Units:   YES   MODE Latest Units:   PRESSURE CONTROL   FIO2 Latest Units: % 100   IPAP/PIP Latest Units:   26   EPAP/CPAP/PEEP Latest Units:   6.0   O2 METHOD Latest Units:   VENTILATOR   SET RATE Latest Units:   30   Critical value read back Unknown Derik Cabrera MD       Results given to Dr. Fernanda Prieto by RT. No new orders at this time.

## 2018-01-01 NOTE — ED NOTES
Assumed care of patient from [facility]. Patient is alert to noxious stimuli, tachypneic, tachycardic and very lethargic. Lungs sounds absent on the left side, TIMOTHY crackles, right upper and lower lobe crackles. Patient significant other at bedside. Patient becoming anxious at times for water. Left AC IV infiltrated. New PIV started. The patient is connected to the monitor, blood pressure and continuous pulse oximetry and the cardiac monitor. Patient is resting comfortably, bed in the lowest position, side rails raised, call bell in hand, lights dim. Instructed patient to not get up without assistance, and to ring the call bell for any questions or concerns. Updated patient on the plan of care.

## 2018-01-01 NOTE — PROGRESS NOTES
Pharmacy Automatic Renal Dosing Protocol - Antimicrobials    Indication for Antimicrobials: HAP     Current Regimen of Each Antimicrobial:  Vancomycin 2000 mg X1, then 1g q 12 hours(Start Date ; Day # 1  Fluconazole 400 mg every day (; day #1  Cefepime 2g q 8 hours (; day #1   mg q 24 hours (; day #1    Previous Antimicrobial Therapy:  none    Goal Level: VANCOMYCIN TROUGH GOAL RANGE    Vancomycin Trough: 15 - 20 mcg/mL    Measured / Extrapolated Vancomycin Level: na   / na    Significant Cultures:   : Blood cx: pending    Paralysis, amputations, malnutrition: none    Labs:  Recent Labs      18   0025   CREA  1.07   BUN  22*   WBC  32.3*     Temp (24hrs), Av.7 °F (36.5 °C), Min:97.7 °F (36.5 °C), Max:97.7 °F (36.5 °C)      Creatinine Clearance (mL/min) or Dialysis: >60  No results found for: PCT    Impression/Plan:   Will give vancomycin 2g X1, then 1g q 12 hours. This will yield an estimated trough of 15 mcg/ml. Fluconazole, cefepime and LVQ dose are appropriate      Pharmacy will follow daily and adjust medications as appropriate for renal function and/or serum levels. Thank you,  Maeve Tubbs, RIAND          Recommended duration of therapy  http://[facility]/[place]/[facility]/Pharmacy/Clinical%20Companion/Duration%20of%20ABX%20therapy. docx    Renal Dosing  http://[facility]/[place]/[facility]/Pharmacy/Clinical%20Companion/Renal%[id number]. pdf

## 2018-01-01 NOTE — ED NOTES
ABG received, will intubate patient at this time. Emergency equipment at bedside, RT at bedside, MD Noel Juarez and Gilbert at bedside. Aiden RN at bedside to RSI. Patient remains sinus tach with flutter mottled skin, tachypneic. Dentures, wallet, shoes shirt and pants placed in belonging bag with label at bedside. 06:12 - 20mg Etomidate, 100mg Succs. Given per MD Noel Juarez for RSI. Allergies verified. 06:15 - Intubated by MD Noel Juarez, 22 at the gum, verified with colormetric and auscultation. Will do cxr. 98% on vent. Sinus tach at 102, tolerated well.    06:29 - OG 16 Bulgarian tube placed, jarvis placed 14french, tolerated well. Verified OG with auscultation. OG draining copious amounts of dark fluid. Patient suctioned, orally, thick secretions. HR 78 in atrial flutter, Diltiazem turned down per MD Noel Juarez. CO2 81 at this time, 100% on assist control 16, volume 500, 100%, peep 6.     06:38 - Diltiazem turned off per MD. Robyn Schofield at bedside. Patient linens changed, repositioned. Skin intact, dual skin with Aiden RN - mottling noted with large CABG scar in addition on chest.     06:40 - Spoke with MD Noel Juarez about BP and MD Hunt, reports 500cc bolus should help and hypotension is related to RSI medications. Will monitor closely. Awaiting CXR and KUB    07:00 - MD Noel Juarez at bedside to reassess, BP remains hypotensive 70/54. Patient alert to noxious stimuli, able to move all four extremities on command. Verbal order for another 500cc bolus. 07:35 - Patient fighting the tube, arms moving around, legs flailing in the bed. Patient bilateral soft wrists placed by Sindhu Moore. Advocated for patient to have sedation, vasopressors, and a central line. TRANSFER - OUT REPORT:    Verbal report given to [place] RN(name) on Haroon Michele  being transferred to CCU(unit) for routine progression of care       Report consisted of patients Situation, Background, Assessment and   Recommendations(SBAR).      Information from the following report(s) SBAR, Kardex, ED Summary, Procedure Summary, Intake/Output, MAR, Recent Results, Med Rec Status and Cardiac Rhythm Atrial Flutter was reviewed with the receiving nurse. Lines:   Peripheral IV 01/01/18 Right Antecubital (Active)   Site Assessment Clean, dry, & intact 1/1/2018 12:28 AM   Phlebitis Assessment 0 1/1/2018 12:28 AM   Infiltration Assessment 0 1/1/2018 12:28 AM   Dressing Status Clean, dry, & intact 1/1/2018 12:28 AM   Hub Color/Line Status Green 1/1/2018 12:28 AM       Peripheral IV 01/01/18 Left Wrist (Active)   Site Assessment Clean, dry, & intact 1/1/2018  3:54 AM   Phlebitis Assessment 0 1/1/2018  3:54 AM   Infiltration Assessment 0 1/1/2018  3:54 AM   Dressing Status Clean, dry, & intact 1/1/2018  3:54 AM   Hub Color/Line Status Blue 1/1/2018  3:54 AM        Opportunity for questions and clarification was provided. Patient transported with:   Monitor  Patient-specific medications from Pharmacy  Registered Nurse  Tech    Report given to [facility] RN, will monitor patient and treat and stabilize until transfer to [place].

## 2018-01-01 NOTE — ED NOTES
Bedside and Verbal shift change report given to Star Haro RN (oncoming nurse) by Rogelio Mason RN (offgoing nurse). Report included the following information SBAR, Kardex and ED Summary.

## 2018-01-01 NOTE — ED NOTES
RT at bedside, suctioned large amount of yellow secretions. Oral 7.5 ETT remains in place, 23 cm at lip.

## 2018-01-01 NOTE — PROGRESS NOTES
Pharmacy Automatic Renal Dosing Protocol - Antimicrobials    Indication for Antimicrobials: HAP, immunocompromised    Current Regimen of Each Antimicrobial:  Levofloxacin 750 mg IV every 24 hours (Start Date ; Day # 1)  Cefepime 2 g IV every 8 hours (Start Date ; Day # 1)  Fluconazole 400 mg IV every 24 hours (Start Date ; Day # 1)  Vancomycin 2000 mg IV load followed by 1000 mg IV every 12 hours (Start Date ; Day # 1)  Metronidazole 500 mg IV Q12 hours (Start Date ; Day #1)    Goal Level: VANCOMYCIN TROUGH GOAL RANGE  Vancomycin Trough: 15 - 20 mcg/mL    Measured / Extrapolated Vancomycin Level: None    Significant Cultures:    Blood: NGTD x 6 hours- pending   Sputum (tracheal aspirate): Pending    Paralysis, amputations, malnutrition: None documented     Labs:  Recent Labs      18   0025   CREA  1.07   BUN  22*   WBC  32.3*     Temp (24hrs), Av °F (37.2 °C), Min:97.7 °F (36.5 °C), Max:100.2 °F (37.9 °C)        Creatinine Clearance (mL/min) or Dialysis: 70 mL/min   No results found for: PCT    Impression/Plan:   - Metronidazole 500 mg Q8 hours added for anaerobic coverage, will adjust frequency to Q12 hours per P&T protocol  - Vancomycin currently dosed to result in a trough of 15.4 mcg/mL. Pharmacy will follow daily and adjust medications as appropriate for renal function and/or serum levels. Thank you,  XU Deluna          Recommended duration of therapy  http://[facility]/Pharmacy/Clinical%20Companion/Duration%20of%20ABX%20therapy. docx    Renal Dosing  http://[facility]/[place]/[facility]/Pharmacy/Clinical%20Companion/Renal%[id number]. pdf

## 2018-01-01 NOTE — ED PROVIDER NOTES
EMERGENCY DEPARTMENT HISTORY AND PHYSICAL EXAM      Date: 1/1/2018  Patient Name: Alex Block Major    History of Presenting Illness     Chief Complaint   Patient presents with    Respiratory Distress     onset two hours ago       History Provided By: Patient and Patient's Wife    HPI: Isidra Castro, 61 y.o. male followed at the [place], with PMHx significant for COPD, CHF, HTN, DM, and breathing difficulties for almost one year presents via EMS to the ED with cc of worsening SOB over the past few days. Per EMS, pt was en route to the [place] but was taken here for immediate care because this was the closest hospital. Per medical records, pt has been recently evaluated several times this past month at the [place] for worsening SOB. He was found in February 2017 to have almost complete white out of his left lung. He was lost to follow-up but over past month has been admitted for antibiotics, had two bronchoscopies, and is scheduled for a wedge resection of his left lung to determine etiology of the above. One culture grew E Coli and there was some question by his wife about possible fungal infection (but this was not verified on OSH records). Pt also had a stress test Friday. Per patient it was normal. However, records indicate he had an abnormal stress test without reversible defect and an EF of 37% which was up from last noted of 25-30% one year ago. Per records, he was also in [address] 4:1 on 12/29 as well. Per EMS pt was using his oxygen concentrator at 6 but did not have any oxygen tanks. He did not want them at time of discharge per wife. Per wife patient tries to portray a picture of health and does not always see through his follow-up. She states the oxygen is a good example \"where he just didn't want to admit he needed it. \" Pt denies fevers, CP, leg swelling, coughing or known sick contacts. He is on Prednisone at this time.          PCP: PROVIDER UNKNOWN at [facility] ([facility] AT [place]    There are no other complaints, changes, or physical findings at this time. Past History     Past Medical History:  No past medical history on file. Past Surgical History:  No past surgical history on file. Family History:  No family history on file. Social History:  Social History   Substance Use Topics    Smoking status: Not on file    Smokeless tobacco: Not on file    Alcohol use Not on file       Allergies:  No Known Allergies      Review of Systems   Review of Systems   Constitutional: Negative for chills and fever. HENT: Negative. Negative for congestion, rhinorrhea, sneezing and sore throat. Eyes: Negative. Negative for redness and visual disturbance. Respiratory: Positive for shortness of breath. Negative for cough and wheezing. Cardiovascular: Negative. Negative for chest pain and leg swelling. Gastrointestinal: Negative. Negative for abdominal pain, diarrhea, nausea and vomiting. Genitourinary: Negative. Negative for difficulty urinating, discharge and frequency. Musculoskeletal: Negative. Negative for arthralgias, back pain, myalgias and neck stiffness. Skin: Negative. Negative for color change and rash. Neurological: Negative. Negative for dizziness, syncope, weakness, numbness and headaches. Hematological: Negative for adenopathy. Psychiatric/Behavioral: Negative. All other systems reviewed and are negative. Physical Exam   Physical Exam   Constitutional: He is oriented to person, place, and time. He appears distressed. Respiratory failure   HENT:   Head: Atraumatic. Eyes: EOM are normal.   Neck: Normal range of motion. Cardiovascular: Regular rhythm, normal heart sounds and intact distal pulses. Exam reveals no gallop and no friction rub. No murmur heard. Tachycardic 150   Pulmonary/Chest: He is in respiratory distress. He has no wheezes. He has rales. He exhibits no tenderness.    Hypoxic  Increased WOB  Accessory muscle use  Decreased air movement throughout lung fields left > right  occ rales  Speaking in few words   Abdominal: Soft. Bowel sounds are normal. He exhibits no distension and no mass. There is no tenderness. There is no rebound and no guarding. Musculoskeletal: Normal range of motion. He exhibits no edema or tenderness. Neurological: He is alert and oriented to person, place, and time. Skin: Skin is dry. Psychiatric: He has a normal mood and affect. Nursing note and vitals reviewed. Diagnostic Study Results     Labs -     Recent Results (from the past 12 hour(s))   CBC WITH AUTOMATED DIFF    Collection Time: 01/01/18 12:25 AM   Result Value Ref Range    WBC 32.3 (H) 4.1 - 11.1 K/uL    RBC 5.81 (H) 4.10 - 5.70 M/uL    HGB 18.4 (H) 12.1 - 17.0 g/dL    HCT 55.0 (H) 36.6 - 50.3 %    MCV 94.7 80.0 - 99.0 FL    MCH 31.7 26.0 - 34.0 PG    MCHC 33.5 30.0 - 36.5 g/dL    RDW 14.4 11.5 - 14.5 %    PLATELET 924 315 - 708 K/uL    NEUTROPHILS 88 %    BAND NEUTROPHILS 1 %    LYMPHOCYTES 5 %    MONOCYTES 5 %    EOSINOPHILS 0 %    BASOPHILS 0 %    MYELOCYTES 1 %    ABS. NEUTROPHILS 28.7 K/UL    ABS. LYMPHOCYTES 1.6 K/UL    ABS. MONOCYTES 1.6 K/UL    ABS. EOSINOPHILS 0.0 K/UL    ABS. BASOPHILS 0.0 K/UL    RBC COMMENTS NORMOCYTIC, NORMOCHROMIC      DF MANUAL     METABOLIC PANEL, COMPREHENSIVE    Collection Time: 01/01/18 12:25 AM   Result Value Ref Range    Sodium 138 136 - 145 mmol/L    Potassium 4.6 3.5 - 5.1 mmol/L    Chloride 94 (L) 97 - 108 mmol/L    CO2 42 (HH) 21 - 32 mmol/L    Anion gap 2 (L) 5 - 15 mmol/L    Glucose 83 65 - 100 mg/dL    BUN 22 (H) 6 - 20 MG/DL    Creatinine 1.07 0.70 - 1.30 MG/DL    BUN/Creatinine ratio 21 (H) 12 - 20      GFR est AA >60 >60 ml/min/1.73m2    GFR est non-AA >60 >60 ml/min/1.73m2    Calcium 8.8 8.5 - 10.1 MG/DL    Bilirubin, total 0.4 0.2 - 1.0 MG/DL    ALT (SGPT) 40 12 - 78 U/L    AST (SGOT) 35 15 - 37 U/L    Alk.  phosphatase 99 45 - 117 U/L    Protein, total 7.9 6.4 - 8.2 g/dL    Albumin 3.2 (L) 3.5 - 5.0 g/dL    Globulin 4.7 (H) 2.0 - 4.0 g/dL    A-G Ratio 0.7 (L) 1.1 - 2.2     LACTIC ACID    Collection Time: 01/01/18 12:25 AM   Result Value Ref Range    Lactic acid 0.8 0.4 - 2.0 MMOL/L       Radiologic Studies -   CXR Results  (Last 48 hours)               01/01/18 0033  XR CHEST PORT Final result    Impression:  IMPRESSION:   Extensive airspace disease throughout the left lung. Narrative:  INDICATION:   Sepsis       EXAM:  AP CHEST RADIOGRAPH       COMPARISON: June 21, 2008       FINDINGS:       AP portable view of the chest demonstrates prior median sternotomy. The lungs   are adequately expanded. Extensive left lung consolidation with near complete   whiteout. Mild right basilar airspace disease. No pneumothorax. The osseous   structures are unremarkable. Medical Decision Making   I am the first provider for this patient. I reviewed the vital signs, available nursing notes, past medical history, past surgical history, family history and social history. Vital Signs-Reviewed the patient's vital signs. Patient Vitals for the past 12 hrs:   Temp Pulse Resp BP SpO2   01/01/18 0043 - - - - 95 %   01/01/18 0010 97.7 °F (36.5 °C) (!) 140 21 (!) 144/100 93 %       Pulse Oximetry Analysis - 93% on 15L NRB    Cardiac Monitor:   Rate: 140 bpm    EKG interpretation: (Preliminary)  Rhythm: sinus tachycardia; and regular . Rate (approx.): 150; Axis: normal; FL interval: normal; QRS interval: normal ; T wave abnormality; RBBB. Records Reviewed: Nursing Notes, Old Medical Records and Ambulance Run Sheet    Provider Notes (Medical Decision Making):   Pt presents in respiratory failure with hypoxia. HCAP, CHF, Interstitial lung disease, pt denied known toxic exposure during service. Will try nasal canula support for now and place on BiPAP if needed. Treat as Code Sepsis however, with history of heart failure will hold fluids for now.  Complicated case as patient has complete white out of left lung without clear etiology at this time. Pt very worried about bill and asking to be transferred when stable. We discussed the importance of stabilizing him at this time and the need for admission tonight. He endorsed understanding. ED Course:   Initial assessment performed. The patients presenting problems have been discussed, and they are in agreement with the care plan formulated and outlined with them. I have encouraged them to ask questions as they arise throughout their visit. PROGRESS NOTE:   12:33 AM  Pt escalated to BiPAP because he is working harder to breathe and continues to be hypoxic. CONSULT NOTE:   1:25 AM  Robinson Nelson MD spoke with Dr. Jamal Tran,   Specialty: Hospitalist  Discussed pt's hx, disposition, available diagnostic and imaging results, and [facility]. Reviewed care plans. Consultant will accept pt for admission. Will cover him with antifungal as well as hospital acquired PNA coverage. Reason for antifungal is for chronic steroid use. PROGRESS NOTE:  3:30 AM  Sepsis fluid 30mls per kilo, bolus not initiated due to documented heart failure of noted EF of 25% in 2014. He received only 200 total cc NS in ED over six hours. PROGRESS NOTE:  4:13 AM  Pt's heart rate improved from 150s to 120s with 10 IV diltiazem. Will consult with Gilbert but will recommend diltiazem drip to which he agreed given initial response to 10 mg bolus of Diltiazem. Per outside hospital records, Friday stress test 12/29/17, he was in a-flutter 4:1 with EF of 37% (which is improved from last report of 25-30%). PROGRESS NOTE:  5:52 AM  Nurse came by, said pt has persistent hypoxia, increase work of breathing, increasing somnolence, increased WOB, worsening ABG despite adjusting bipap settings. Pt ABG shows pt acidotic. Will plan to intubate. Procedure Note - Orotracheal Intubation:   6:16 AM  Performed by: Robinson Nelson MD   Indication for procedure: respiratory failure  RSI performed.    The patient was sedated with 20 mg of etomidate and 100 mg succinylcholine and orotracheally intubated with a 7.5 cuffed Georgian endotracheal tube using a glidescope with direct visualization. Tube was advanced to 22 cm at the gums. ETT location confirmed by bilateral, symmetric breath sounds and good end-tidal CO2 detector color change . Number of attempts: 1  Complications: none  The procedure took 1-15 minutes, and pt tolerated well. Critical Care Time:   CRITICAL CARE NOTE :  12:22 AM  IMPENDING DETERIORATION -Respiratory and Cardiovascular  ASSOCIATED RISK FACTORS - Hypoxia, Dysrhythmia and Metabolic changes  MANAGEMENT- Bedside Assessment and Supervision of Care  INTERPRETATION -  Xrays, Blood Gases, ECG and Blood Pressure  INTERVENTIONS - vent mngmt  CASE REVIEW - Hospitalist, Nursing and Family  TREATMENT RESPONSE -Stable  PERFORMED BY - Self  NOTES   :  I have spent >110 minutes of critical care time involved in lab review, consultations with specialist, family decision- making, bedside attention and documentation. During this entire length of time I was immediately available to the patient . Disposition:  ADMIT NOTE:  1:31 AM  Patient is being admitted to the hospital by Dr. Earline Patel. The results of their tests and reasons for their admission have been discussed with them and/or available family. They convey agreement and understanding for the need to be admitted and for their admission diagnosis. Consultation has been made with the inpatient physician specialist for hospitalization. PLAN:  1. Admit to hospitalist.    Diagnosis     Clinical Impression:   1. Acute respiratory failure with hypoxia ([address]    2. HAP (hospital-acquired pneumonia)        Attestations: This note is prepared by Nolvia Hilliard, acting as Scribe for Claudette Salinas MD.      The scribe's documentation has been prepared under my direction and personally reviewed by me in its entirety.  I confirm that the note above accurately reflects all work, treatment, procedures, and medical decision making performed by me.   Roderick Lundborg, MD

## 2018-01-01 NOTE — ED NOTES
Pt arrives to the ED for c/c of SOB. Pt uses 6L air concentrator, 76% at home. Pt was diverted from [place] ([facility] undergoing biopsies for undiagnosed lung condition). Pt received neb treatment in route. Pt NRB 15L upon arrival. Pts breathing appears labored. Pt alert, oriented X4, ambulatory to stretcher with assistance.

## 2018-01-01 NOTE — CONSULTS
[facility] INTENSIV[facility] Consult Service Note  Pulmonary, Critical Care, and Sleep Medicine    Name: Funmi Dias MRN: [id number]   : 1960 Hospital: [facility]   Date: 2018   Hospital Day: 1       Subjective/Interval History:   I have reviewed the notes from other providers and old records readily available and summarized findings below with the flowsheet. Seen earlier today on rounds. Patient Active Problem List   Diagnosis Code    Acute encephalopathy G93.40    Acute respiratory failure ([facility] J96.00    Typical atrial flutter (HCC) I48.3     3:25 PM  Poor gas exchange. Essentially single ( or less) lung ventilation with mostly dead space. Conventional support barely adequate. Updated wife and best friend el[facility]. I reached out to , [facility] to discuss management options. Appreciate his time and quidance. ECMO was considered for salvage and support but given pt's long xiong with an unknown left lobar infiltrative process, likelihood of full recovery unlikley. Family was clear when I conveyed the options that pt would not consider portable oxygen or other dependency any quality of life. Decision made to continue with conventional and conservative measures. They agree to make pt a DNR per his wishes and that would include no additional pressors( already on levophed, no shock, no CPR. Will see how he does overnight but is tenuous. Will start low dose IV bicarb and allow permissive hypercapnea. IMPRESSION:     1. Acute and possible chronic respiratory failure with severe hypoxia and severe hypercarbia- noting his erytrhocytosis could be from long standing hypoxia, very difficult to ventilate- explaine to wife at bedside  2.  Severe unresolved and now worsening atypical bilateral penumonia- not just white out of left lung but also RLL and RMl involvement on post intubation CXR- asymmetric left >> right but almost pan lobar- will likely worsen; showed CXRs to wife at bedside; for now sparing RUL but for how long? Had bronch at the [place] and only E coli treated? 3. Abdominal distension-post NGT placement with copious output- not sure if emesis led to aspirationa nd tipped him over? 4. Atrail filbrillation, flutter with RVR  5. Shock responsive to fluids and IV levophed  6. Cardiomyopathy LVEF 37% per -had stress test at [facility]   7. possible DM2  8. possisble Rheumatoid arthritis per wife and only just recently given prednisone  9. Medical non complinance- unclear if he ever really took the meds sent to him, wife noted he refused some and declined home O2  10. Pt is requiring Drug therapy requiring intensive monitoring for toxicity  11. Pt is critically ill and at high risk of end organ dysfunction and failure      RECOMMENDATIONS/PLAN:   1. CCU  2. Sedate  3. CXR  4. May need to paralyze if too much dyssynchrony  5. IV levophed  6. IV fluids  7. IV abx will be adjusted  8. Will send sputum for cultures  9. Bronchial hygiene  10. Pt needs IV fluids with additives and Drug therapy requiring intensive monitoring for toxicity  11. Prescription drug management with home med reconciliation done  12. DVT, SUP prophylaxis  13. Patient requiring restraints due to threat of injury to self, interference with medical devices. 14. Will be available to assist in medical management while in the CCU pending disposition          My assessment/management was discussed with:  Nursing XX    respiratory therapy XX  XX   Family XX wife      I have provided   76 + 35    minutes of critical care time rendering care exclusive of any procedures.  During this entire length of time I was immediately available to the patient.      The reason for providing this level of medical care was due to a critical illness that impaired one or more vital organ systems, such that there was a high probability of imminent or life threatening deterioration in the patient's condition. Pt's condition is unstable and unpredictable. This care involved high complexity decision making which includes independently reviewing the patient's past medical records, current laboratory results, medication profiles that were immediately available to me and actual Xray images at the bedside in order to assess, support vital system function, and to treat this degree of vital organ system failure, and to prevent further life threatening deterioration of the patients condition. I was in direct communication with the nursing staff throughout this time. I have personally and independently reviewed the patients interval diagnostic lab data, radiographs and the reports. I have ordered additional labs to follow the current medical conditions and to monitor treatment responses over the next 24 hours or sooner if needed. I will order additional imaging to follow longitudinal changes found on the most current imaging. Risk of deterioration: high   [x] High complexity decision making was performed  [x] See my orders for details  Tubes:   Granados and Intubated/Vent  ICU disposition :Unchanged. Updated Family. Time 1:20 PM      Code: Full Code        Subjective/Initial History:   I have reviewed the flowsheet and previous days notes. Seen earlier today on rounds. I was asked by Teofilo Enriquez MD to see Luis Manuel Lorenzo in consultation for a chief complaint of severe bilateral pneumonia    Marc Michele, 61 y.o. male retired  per wife followed at the [place], with PMHx significant for COPD, CHF, HTN, DM, and breathing difficulties for almost one year presents via EMS to the ED with cc of worsening SOB over the past few days. Per EMS, pt was en route to the [place] but was taken here for immediate care because this was the closest hospital. Per medical records, pt has been recently evaluated several times this past month at the [place] for worsening SOB.  He was found in February 2017 to have almost complete white out of his left lung. He was lost to follow-up but over past month has been admitted for antibiotics, had two bronchoscopies, and is scheduled for a wedge resection of his left lung to determine etiology of the above. One culture grew E Coli and there was some question by his wife about possible fungal infection (but this was not verified on OSH records). Pt also had a stress test Friday. Per patient it was normal. However, records indicate he had an abnormal stress test without reversible defect and an EF of 37% which was up from last noted of 25-30% one year ago. Per records, he was also in [address] 4:1 on 12/29 as well. Per EMS pt was using his oxygen concentrator at 6 but did not have any oxygen tanks. He did not want them at time of discharge per wife. Patient tries to portray a picture of health and does not always see through his follow-up. She states the oxygen is a good example \"where he just didn't want to admit he needed it. \" she notes others have noted him not looking well thepast three months. He was discharged fr the [place] in December and was offered home o2 which he declined. Last week he was well enough to run errands. Last night he had some emesis but non bloody. Pt was coughing up copious purulent sputum. Became hypotensive in the ER. Was hypoxic and severely hypercapneic and intubated in the ER. Discussed with dr. Eric Nichols and CCU nurising . Pt also being seen by Cardiology for atrail flutter. arrrived getting IV levophed and IV fluid bolus. Very restless, agitated pulling at tubing and resisting staff. Before intubation the pt denied fevers, CP, leg swelling, coughing or known sick contacts. He is on Prednisone at this time. PMH:  has no past medical history on file. PSH:   has no past surgical history on file. FHX: family history includes Hypertension in an other family member.    SHX:    The patient is unable to give any meaningful history or review of systems due to patient factors. ROS:Review of systems not obtained due to patient factors.     No Known Allergies     MAR reviewed and pertinent medications noted or modified as needed  MEDS:   Current Facility-Administered Medications   Medication    ondansetron (ZOFRAN) injection 4 mg    labetalol (NORMODYNE;TRANDATE) injection 10 mg    albuterol-ipratropium (DUO-NEB) 2.5 MG-0.5 MG/3 ML    0.9% sodium chloride infusion    albuterol-ipratropium (DUO-NEB) 2.5 MG-0.5 MG/3 ML    [START ON 2018] levoFLOXacin (LEVAQUIN) 750 mg in D5W IVPB    [START ON 2018] cefepime (MAXIPIME) 2 g in 0.9% sodium chloride (MBP/ADV) 100 mL    sodium chloride (NS) flush 5-10 mL    sodium chloride (NS) flush 5-10 mL    hydrocortisone Sod Succ (PF) (SOLU-CORTEF) injection 50 mg    fluconazole (DIFLUCAN) 400mg/200 mL IVPB (premix)    NOREPINephrine (LEVOPHED) 8 mg in dextrose 5% 250 mL infusion    vancomycin (VANCOCIN) 1,000 mg in 0.9% sodium chloride (MBP/ADV) 250 mL    LORazepam (ATIVAN) injection 1-2 mg    famotidine (PF) (PEPCID) 20 mg in sodium chloride 0.9 % 10 mL injection    fentaNYL citrate (PF) injection  mcg    LORazepam (ATIVAN) injection 1 mg    chlorhexidine (PERIDEX) 0.12 % mouthwash 15 mL    mupirocin calcium (BACTROBAN) 2 % cream    enoxaparin (LOVENOX) injection 70 mg    amiodarone (CORDARONE) 375 mg/250 mL D5W infusion    Followed by   Dewayne Gar amiodarone (CORDARONE) 375 mg/250 mL D5W infusion        Objective:     Vital Signs: Intake/Output: Intake/Output:   Temp (24hrs), Av °F (37.2 °C), Min:97.7 °F (36.5 °C), Max:100.2 °F (37.9 °C)    Visit Vitals    BP (!) 88/53    Pulse (!) 102    Temp 99 °F (37.2 °C)    Resp (!) 34    Ht 5' 7\" (1.702 m)    Wt 72.6 kg (160 lb)    SpO2 97%    BMI 25.06 kg/m2          Telemetry:    atrial fluttter O2 Device: Endotracheal tube  O2 Flow Rate (L/min): 15 l/min    Wt Readings from Last 4 Encounters:   18 72.6 kg (160 lb)          Intake/Output Summary (Last 24 hours) at 01/01/18 1326  Last data filed at 01/01/18 0845   Gross per 24 hour   Intake                0 ml   Output              750 ml   Net             -750 ml     Last shift:      01/01 0701 - 01/01 1900  In: -   Out: 550 [Urine:150]  Last 3 shifts: 12/30 1901 - 01/01 0700  In: -   Out: 200 [Urine:200]     Hemodynamics:    CO:    CI:    CVP:    SVR:   PAP Systolic:    PAP Diastolic:    PVR:    FX58:        Ventilator Settings:      Mode Rate TV Press PEEP FiO2 PIP Min. Vent   Assist control    250 ml    6 cm H20 100 %  44 cm H2O  7.17 l/min      Physical Exam:     General: severely ill WM on vent   HEAD: Normocephalic, without obvious abnormality, atraumatic   EYES: conjunctivae clear. PERRL,  AN Icteric sclerae   NOSE:  nares normal, no drainage, no flaring,    THROAT: intubated; Lips, mucosa dry; ? Oral hygiene;     Neck: Supple, symmetrical, trachea midline,  No accessory mm use; No Stridor/ cuff leak, No goiter or thyroid tenderness   LYMPH: No abnormally enlarged lymph nodes. in neck or groin   Chest: increased AP diameter   Lungs: rales bilaterally and rhonchi   Heart: Regular rate and rhythm  No edema   Abdomen: soft, non-tender, without masses or organomegaly, protuberant   : normal;     Granados   Extremity: negative, clubbing   Neuro: obtunded Motor 4 / 5 symmetric; withdraws to pain; unable to check gait and station   Psych:  Unable to assess; 2-3+ agitation   Skin: Warmno lesions noted and no edema;    Pulses:Bilateral, Radial, 1+   Capillary refill: abnormal:  sluggish capillary refill,      Data:   Interval lab and diagnostic data was reviewed. Interval radiology images were independently viewed and available reports were reviewed. All lab results for the last 24 hours reviewed.           Labs:    Recent Labs      01/01/18   0025   WBC  32.3*   HGB  18.4*   PLT  239     Recent Labs      01/01/18   0025   NA  138   K  4.6   CL  94*   CO2  42*   GLU  83   BUN  22*   CREA  1.07 CA  8.8   LAC  0.8   ALB  3.2*   SGOT  35   ALT  40     Recent Labs      01/01/18   1151  01/01/18   0733  01/01/18   0530   PH  7.14*  7.29*  7.07*   PCO2  102*  76*  135*   PO2  83  84  70*   HCO3  34*  36*  39*   FIO2  100  100  90     Recent Labs      01/01/18   0516   TROIQ  0.05*     No results found for: BNPP, BNP   Lab Results   Component Value Date/Time    Culture result: NO GROWTH AFTER 6 HOURS 01/01/2018 12:25 AM     No results found for: VANCT, CPK    No results found for: COLOR, APPRN, SPGRU, ANGELO, PROTU, GLUCU, KETU, BILU, BLDU, UROU, AMBROSE, LEUKU, WBCU, RBCU, UEPI, BACTU, CASTS, UCRY    No results found for: IRON, FE, TIBC, IBCT, PSAT, FERR  No results found for: SR, CRP, HEAVENLY, ANAIGG, RA, RPR, RPRT, VDRLT, VDRLS, TSH, TSHEXT   No results found for: TOXA1, RPR, HBCM, HBSAG, HAAB, HCAB1, HAAT, G6PD, CRYAC, HIVGT, HIVR, HIV1, HIV12, HIVPC, HIVRPI      Imaging:  I have personally reviewed the patients radiographs and have reviewed the reports:          Results from Hospital Encounter encounter on 01/01/18   XR CHEST PORT   Narrative EXAM:  XR CHEST PORT. INDICATION: Sepsis. COMPARISON: 1/1/2018 at 0631 hours. FINDINGS:   A portable AP radiograph of the chest was obtained at 0951 hours. There are  sternal sutures and a valve replacement. Lines and tubes: The patient is on a cardiac monitor. The endotracheal tube and  nasogastric tube are unchanged in position. Lungs: There continues to be complete opacification of the left hemithorax. The  right lower lobe is difficult to evaluate. Pleura: There is no pneumothorax or pleural effusion. Mediastinum: The cardiac size is difficult to evaluate. The aorta is  atherosclerotic. There are surgical clips in the left hilum. Bones and soft tissues: The bones and soft tissues are grossly within normal  limits. Impression IMPRESSION: Persistent opacity of the left hemithorax and right lower lobe  airspace disease.           No results found for this or any previous visit. Thank you for allowing us to participate in the care of this patient. We will be happy to follow along with you.     Bessy Perez MD

## 2018-01-01 NOTE — ED NOTES
Assumed care of pt after report given to ICU. Pt medicated with versed, now resting in restraints with SBP >90. Remains in A Flutter, -116. Copious amounts of yellow secretions noted, remains on vent at current settings. Levophed drip at bedside, diltiazem gtt d/c'd at this time.

## 2018-01-01 NOTE — PROGRESS NOTES
Update as to HPI given records from [facility] have arrived. Patient has past medical history for rheumatoid arthritis on chronic immunosuppression with hydroxychloroquine, diabetes mellitus type 2, coronary artery disease, chronic systolic heart failure ejection fraction 25%, benign essential hypertension, aortic valve replacement, tobacco abuse, severe chronic obstructive pulmonary disease. Addition to HPI:   Patient was admitted to [facility] on December 18, 2017 through December 22, 2017. On December 19, 2017 the patient was status post flexible bronchoscopy with cryo-biopsy by pulmonary medicine at the [facility]. Records reveal that the patient has had near complete opacification of the left lung since February 2017. Record further shows CAT scan on 11/20/2017 showing multi lobar infiltrate with extensive involvement of left upper and left lower lobes with progression into the right lower lobe. Bronchoscopy was performed initially on 12/5/2017 with results showing pansensitive E. coli negative malignant cells. He completed a two-week course of ciprofloxacin. Workup has been essentially negative for the diffuse pulmonary infiltrate. PAS stain negative, TB nondiagnostic, CCP was elevated at greater than 250, aspergillus and blasto negative, IgE within normal limits, histoplasmosis negative. The patient was started on prednisone for discharge 60 mg daily. Repeat CT done prior to discharge from [facility] hospital on 12/22 showed worsening involvement of the right lower lobe and therefore placed on Levaquin to complete a 7 day course of antibiotics upon discharge. He was continued on hydroxychloroquine for his rheumatoid arthritis. Patient underwent myocardial perfusion scan on 12/29 showing ejection fraction 37% with global hypokinesis and impaired thickening in the proximal lateral and septal walls.  He had fixed perfusion defects in the apical septal and proximal inferior lateral wall no significant reversible perfusion defect.     Planned changes based on this information:  -continue abx as we are doing given immunocompromised state  -use solucortef as we are doing given chronic prednisone use  -await medications he is currently taking and add back as able  Suzanne Barlow MD

## 2018-01-01 NOTE — PROGRESS NOTES
Assisted in bringing patient to room from ED. Primary Nurse Neli Oliveira, NIMISHA and Tiffanie Yee RN performed a dual skin assessment on this patient No impairment noted  Angus score is 13  1  Girlfriend called, giving information regarding past.  Recent treatment from [address] [facility] with biopsy of lung and antibiotic treatments. \" CABG was about 7 years ago, has been on patch for cigarette addiction for a couple of months\", per Severo Sarkar. Aware of being on Mechanical Ventilator, to alert daughter to condition.

## 2018-01-01 NOTE — ED NOTES
Rounded on pt and visitor, updated on plan of care to start antibiotic treatment. Pt resting comfortably, provided sip of water and able to tolerate. Call bell within reach, will continue to monitor.

## 2018-01-01 NOTE — ED NOTES
Pt meets [facility]. Discussed with MD Gorge Denver. Will hold off on fluids until Xray is done, due to diminished lungs sounds.

## 2018-01-01 NOTE — PROGRESS NOTES
Follow up as requested by family. Pt was occasionally opening his eyes and moving his arms. Cristo Duncan was concerned that he seemed to be sweating.  facilitated providing a cool cloth for pt and provided assurance to family that they would remain in my prayers. Family seemed to be dispersing soon.  reminded them of  availability. For Spiritual Care paging please call [phone]([phone]).        Deb Vaughn.

## 2018-01-01 NOTE — ED NOTES
Rounded on pt, pt appears to be more comfortable with Bipap on. Call bell within reach, will continue to monitor.

## 2018-01-01 NOTE — CONSULTS
Cardiology Consult Note       Date of  Admission: 1/1/2018 12:03 AM     Admission type:Emergency     Subjective:     Mr. Michele is admitted with acute respiratory failure. Noted to be in aflutter with rapid rate. He is intubated and sedated. History from chart review. Recent stress test at [facility] showed EF 37%, no significant ischemia. Has significant pulmonary issues . CT, brochoscopy results noted per hospitalist. He is on antibiotics, levophed. Patient Active Problem List    Diagnosis Date Noted    Acute encephalopathy 01/01/2018    Acute respiratory failure ([facility] 01/01/2018    Typical atrial flutter ([facility] [address] 01/01/2018      PROVIDER UNKNOWN  History reviewed. No pertinent past medical history. No past surgical history on file.   No Known Allergies   Family History   Problem Relation Age of Onset    Hypertension Other       Current Facility-Administered Medications   Medication Dose Route Frequency    ondansetron (ZOFRAN) injection 4 mg  4 mg IntraVENous Q4H PRN    labetalol (NORMODYNE;TRANDATE) injection 10 mg  10 mg IntraVENous Q2H PRN    albuterol-ipratropium (DUO-NEB) 2.5 MG-0.5 MG/3 ML  3 mL Nebulization Q2H PRN    0.9% sodium chloride infusion  75 mL/hr IntraVENous CONTINUOUS    albuterol-ipratropium (DUO-NEB) 2.5 MG-0.5 MG/3 ML  3 mL Nebulization Q4H RT    [START ON 1/2/2018] levoFLOXacin (LEVAQUIN) 750 mg in D5W IVPB  750 mg IntraVENous Q24H    [START ON 1/2/2018] cefepime (MAXIPIME) 2 g in 0.9% sodium chloride (MBP/ADV) 100 mL  2 g IntraVENous Q8H    sodium chloride (NS) flush 5-10 mL  5-10 mL IntraVENous Q8H    sodium chloride (NS) flush 5-10 mL  5-10 mL IntraVENous PRN    hydrocortisone Sod Succ (PF) (SOLU-CORTEF) injection 50 mg  50 mg IntraVENous Q8H    fluconazole (DIFLUCAN) 400mg/200 mL IVPB (premix)  400 mg IntraVENous DAILY    NOREPINephrine (LEVOPHED) 8 mg in dextrose 5% 250 mL infusion  2-16 mcg/min IntraVENous TITRATE    vancomycin (VANCOCIN) 1,000 mg in 0.9% sodium chloride (MBP/ADV) 250 mL  1,000 mg IntraVENous Q12H    LORazepam (ATIVAN) injection 1-2 mg  1-2 mg IntraVENous ONCE    famotidine (PF) (PEPCID) 20 mg in sodium chloride 0.9 % 10 mL injection  20 mg IntraVENous Q12H    fentaNYL citrate (PF) injection  mcg   mcg IntraVENous Q2H PRN    LORazepam (ATIVAN) injection 1 mg  1 mg IntraVENous Q4H PRN    chlorhexidine (PERIDEX) 0.12 % mouthwash 15 mL  15 mL Oral Q12H    mupirocin calcium (BACTROBAN) 2 % cream   Topical BID    amiodarone (CORDARONE) 150 mg in dextrose 5% 100 mL bolus infusion  150 mg IntraVENous ONCE    amiodarone (CORDARONE) 450 mg in dextrose 5% 250 mL infusion  0.5-1 mg/min IntraVENous CONTINUOUS    enoxaparin (LOVENOX) injection 70 mg  1 mg/kg SubCUTAneous Q12H         Review of Symptoms:  Review of systems not obtained due to patient factors. Physical Exam    Visit Vitals    /73    Pulse (!) 117    Temp 100.2 °F (37.9 °C)    Resp 24    Ht 5' 7\" (1.702 m)    Wt 160 lb (72.6 kg)    SpO2 95%    BMI 25.06 kg/m2     General Appearance:  Well developed, well nourished,alert and oriented x 0, and individual in no acute distress. Ears/Nose/Mouth/Throat:   Hearing grossly normal.         Neck: Supple. Chest:   Lungs clear to auscultation bilaterally. Cardiovascular:  Regular rate and rhythm, S1, S2 normal, no murmur. Abdomen:   Soft, non-tender, bowel sounds are active. Extremities: No edema bilaterally. Skin: Warm and dry.                Cardiographics    Telemetry: atrial flutter  ECG: atrial flutter with variable conduction, rate 130-140  Echocardiogram: Not done    Labs:   Recent Results (from the past 24 hour(s))   CBC WITH AUTOMATED DIFF    Collection Time: 01/01/18 12:25 AM   Result Value Ref Range    WBC 32.3 (H) 4.1 - 11.1 K/uL    RBC 5.81 (H) 4.10 - 5.70 M/uL    HGB 18.4 (H) 12.1 - 17.0 g/dL    HCT 55.0 (H) 36.6 - 50.3 %    MCV 94.7 80.0 - 99.0 FL    MCH 31.7 26.0 - 34.0 PG    MCHC 33.5 30.0 - 36.5 g/dL RDW 14.4 11.5 - 14.5 %    PLATELET 866 692 - 203 K/uL    NEUTROPHILS 88 %    BAND NEUTROPHILS 1 %    LYMPHOCYTES 5 %    MONOCYTES 5 %    EOSINOPHILS 0 %    BASOPHILS 0 %    MYELOCYTES 1 %    ABS. NEUTROPHILS 28.7 K/UL    ABS. LYMPHOCYTES 1.6 K/UL    ABS. MONOCYTES 1.6 K/UL    ABS. EOSINOPHILS 0.0 K/UL    ABS. BASOPHILS 0.0 K/UL    RBC COMMENTS NORMOCYTIC, NORMOCHROMIC      DF MANUAL     METABOLIC PANEL, COMPREHENSIVE    Collection Time: 01/01/18 12:25 AM   Result Value Ref Range    Sodium 138 136 - 145 mmol/L    Potassium 4.6 3.5 - 5.1 mmol/L    Chloride 94 (L) 97 - 108 mmol/L    CO2 42 (HH) 21 - 32 mmol/L    Anion gap 2 (L) 5 - 15 mmol/L    Glucose 83 65 - 100 mg/dL    BUN 22 (H) 6 - 20 MG/DL    Creatinine 1.07 0.70 - 1.30 MG/DL    BUN/Creatinine ratio 21 (H) 12 - 20      GFR est AA >60 >60 ml/min/1.73m2    GFR est non-AA >60 >60 ml/min/1.73m2    Calcium 8.8 8.5 - 10.1 MG/DL    Bilirubin, total 0.4 0.2 - 1.0 MG/DL    ALT (SGPT) 40 12 - 78 U/L    AST (SGOT) 35 15 - 37 U/L    Alk.  phosphatase 99 45 - 117 U/L    Protein, total 7.9 6.4 - 8.2 g/dL    Albumin 3.2 (L) 3.5 - 5.0 g/dL    Globulin 4.7 (H) 2.0 - 4.0 g/dL    A-G Ratio 0.7 (L) 1.1 - 2.2     CULTURE, BLOOD    Collection Time: 01/01/18 12:25 AM   Result Value Ref Range    Special Requests: NO SPECIAL REQUESTS      Culture result: NO GROWTH AFTER 6 HOURS     LACTIC ACID    Collection Time: 01/01/18 12:25 AM   Result Value Ref Range    Lactic acid 0.8 0.4 - 2.0 MMOL/L   NT-PRO BNP    Collection Time: 01/01/18 12:25 AM   Result Value Ref Range    NT pro-BNP 4443 (H) 0 - 125 PG/ML   EKG, 12 LEAD, INITIAL    Collection Time: 01/01/18 12:33 AM   Result Value Ref Range    Ventricular Rate 150 BPM    Atrial Rate 150 BPM    P-R Interval 120 ms    QRS Duration 138 ms    Q-T Interval 304 ms    QTC Calculation (Bezet) 480 ms    Calculated P Axis 115 degrees    Calculated R Axis 132 degrees    Calculated T Axis -66 degrees    Diagnosis       ** Suspect arm lead reversal, interpretation assumes no reversal  Sinus tachycardia  Right bundle branch block  Lateral infarct , age undetermined  T wave abnormality, consider inferior ischemia  Abnormal ECG  No previous ECGs available     BLOOD GAS, ARTERIAL    Collection Time: 01/01/18 12:56 AM   Result Value Ref Range    pH 7.23 (LL) 7.35 - 7.45      PCO2 111 (H) 35.0 - 45.0 mmHg    PO2 71 (L) 80 - 100 mmHg    O2 SAT 90 (L) 92 - 97 %    BICARBONATE 45 (H) 22 - 26 mmol/L    BASE EXCESS 11.9 mmol/L    O2 METHOD BIPAP      FIO2 90 %    SET RATE 4      SPONTANEOUS RATE 32      IPAP/PIP 14      EPAP/CPAP/PEEP 5      Sample source ARTERIAL      SITE LEFT RADIAL      SYBIL'S TEST YES      Critical value read back CVRB TO Chela Sánchez MD @ 0103    TROPONIN I    Collection Time: 01/01/18  5:16 AM   Result Value Ref Range    Troponin-I, Qt. 0.05 (H) <0.05 ng/mL   CK W/ REFLX CKMB    Collection Time: 01/01/18  5:16 AM   Result Value Ref Range    CK 40 39 - 308 U/L   BLOOD GAS, ARTERIAL    Collection Time: 01/01/18  5:30 AM   Result Value Ref Range    pH 7.07 (LL) 7.35 - 7.45      PCO2 135 (H) 35.0 - 45.0 mmHg    PO2 70 (L) 80 - 100 mmHg    O2 SAT 84 (L) 92 - 97 %    BICARBONATE 39 (H) 22 - 26 mmol/L    BASE EXCESS 3.3 mmol/L    O2 METHOD BIPAP      FIO2 90 %    Sample source ARTERIAL      SITE RIGHT RADIAL      SYBIL'S TEST YES      Critical value read back Bryn Dennis RN    BLOOD GAS, ARTERIAL    Collection Time: 01/01/18  7:33 AM   Result Value Ref Range    pH 7.29 (L) 7.35 - 7.45      PCO2 76 (H) 35.0 - 45.0 mmHg    PO2 84 80 - 100 mmHg    O2 SAT 95 92 - 97 %    BICARBONATE 36 (H) 22 - 26 mmol/L    BASE EXCESS 6.2 mmol/L    O2 METHOD VENTILATOR      FIO2 100 %    MODE A/C      Tidal volume 500      SET RATE 16      EPAP/CPAP/PEEP 6.0      Sample source ARTERIAL      SITE RIGHT RADIAL      SYBIL'S TEST YES     EKG, 12 LEAD, INITIAL    Collection Time: 01/01/18 11:04 AM   Result Value Ref Range    Ventricular Rate 125 BPM    Atrial Rate 311 BPM    QRS Duration 128 ms    Q-T Interval 348 ms    QTC Calculation (Bezet) 502 ms    Calculated R Axis 119 degrees    Calculated T Axis -22 degrees    Diagnosis       Atrial flutter with variable AV block  Nonspecific intraventricular block  Lateral infarct , age undetermined  T wave abnormality, consider inferior ischemia  T wave abnormality, consider anterior ischemia  Abnormal ECG  When compared with ECG of 01-JAN-2018 00:33,  MANUAL COMPARISON REQUIRED, DATA IS UNCONFIRMED     GLUCOSE, POC    Collection Time: 01/01/18 11:10 AM   Result Value Ref Range    Glucose (POC) 133 (H) 65 - 100 mg/dL    Performed by [facility]         Assessment:     Assessment:       Active Problems:    Acute encephalopathy (1/1/2018)      Acute respiratory failure ([address] (1/1/2018)      Typical atrial flutter ([address] (1/1/2018)         Plan: Active Problems:    Acute encephalopathy (1/1/2018)      Acute respiratory failure (HCC) (1/1/2018)      Typical atrial flutter ([address] (1/1/2018)- rapid rate. Duration unknown. He is on levophed to help his BP. Will start amiodarone for rate control. He will need full dose anticoagulation. Will d/c prophylactic heparin and start lovenox. Check echo. Hopefully will improve with correction of his respiratory status. May need FLORIAN/DCC if no improvement. Will follow, thank you for the consult.         Swetha Pierce MD

## 2018-01-01 NOTE — PROGRESS NOTES
Spiritual Care Assessment/Progress Notes    Staci Bedoya [id number]  [id number]    1960  61 y.o.  male    Patient Telephone Number: [phone] (home)   Gnosticism Affiliation: Unknown   Language: English   Extended Emergency Contact Information  Primary Emergency Contact: [address] Phone: [phone]  Relation: Other Relative   Patient Active Problem List    Diagnosis Date Noted    Acute encephalopathy 01/01/2018    Acute respiratory failure ([facility] [address] 01/01/2018    Typical atrial flutter ([facility] [address] 01/01/2018        Date: 1/1/2018       Level of Gnosticism/Spiritual Activity:  []         Involved in ryan tradition/spiritual practice    [x]         Not involved in ryan tradition/spiritual practice  [x]         Spiritually oriented    []         Claims no spiritual orientation    []         seeking spiritual identity  []         Feels alienated from Gnosticism practice/tradition  []         Feels angry about Gnosticism practice/tradition  [x]         Spirituality/Gnosticism tradition may be a resource for coping at this time.   []         Not able to assess due to medical condition    Services Provided Today:  [x]         crisis intervention    []         reading Scriptures  [x]         spiritual assessment    []         prayer  [x]         empathic listening/emotional support  []         rites and rituals (cite in comments)  [x]         life review     []         Gnosticism support  []         theological development   []         advocacy  []         ethical dialog     []         blessing  []         bereavement support    [x]         support to family  []         anticipatory grief support   []         help with AMD  []         spiritual guidance    []         meditation      Spiritual Care Needs  [x]         Emotional Support  [x]         Spiritual/Gnosticism Care  []         Loss/Adjustment  []         Advocacy/Referral                /Ethics  []         No needs expressed at               this time  []         Other: (note in               comments)  [address]  []         Follow up visits with               pt/family  []         Provide materials  []         Schedule sacraments  []         Contact Community               Clergy  [x]         Follow up as needed  []         Other: (note in               comments)     Comments:   Responded to page from Melinda Cao to support family of pt who may be transitioning to comfort care. Provided support to pt's partner of 16 years, Steven Nixon; pt's sister, Estelle Burton, best friend, other male visitor, and 2 nieces - Major and ? .  Provided listening presence as family shared stories of his birth/adoption and growing up. Pt goes by the nickname \"Gene\" and is a very private person who enjoys being active. Family shared stories of his lying to cover up the severity of his illnesses -- denying any problem with diabetes or his heart. Family shared that he had become more interested in Judaism and life after death more recently -- sharing their suspicion that he had a deeper understanding of how serious his condition had become. Pt regained consciousness briefly.  stepped out as another page had come through. Family asked this  to come back, if possible. For Spiritual Care paging please call [address] [place] [phone]).       Deb Vaughn.

## 2018-01-01 NOTE — H&P
Hospitalist Admission Note    NAME: Ricki Drummond   :  1960   MRN:  [id number]     Date/Time:  2018 5:01 AM    Patient PCP: PROVIDER UNKNOWN  ________________________________________________________________________    Given the patient's current clinical presentation, I have a high level of concern for decompensation if discharged from the ED. Complex decision making was performed which includes reviewing the patient's available past medical records, laboratory results, and Xray films. I have also directly communicated my plan and discussed this case with the involved ED physician. My assessment of this patient's clinical condition and my plan of care is as follows:    Assessment / Plan:  acute hypoxic, hypercarbic respiratory failure suspect acute on chronic causing Acute encephalopathy  Atrial fibrillation with rapid response  Sepsis due to Possible bilateral pneumonia  Hypotension post intubation  Chronic steroid use, possible adrenal insufficiency  Chronic left lung white out of unclear origin  -steroids to cover both possible adrenal insufficiency and respiratory exacerbation  -nebs prn and scheduled  -remains intubated with vent management per pulmonary  -unclear etiolgoy of chronic left lung white out - may need to discuss with pulmonary at [facility] for further insight and details as to what their next steps were to be and see if we can help further  -not stable for transfer at this time to [facility] which were his wishes  -will empirically treat for HAP given recent [facility] IP stay - cefepime, lq, vanco.   -will continue on diflucan as well given chronic steroids and lung condition  -tailor abx regimen based on culture results  -abg as needed  -amazingly lactic acid wnl  -CXR in AM per pulmonary  -for now is on diltiazem gtt but will be curious to see if we will need post intubation - dc if bp drops or HR drops.  Can use levophed prn  -there was concern he may be immunocompromised and this may change our differential diagnosis once we confirm he is or is not immunosuppressed. Abdominal distension  -post NGT placement with copious output  -stat KUB  -etiology as to why distension occurred unclear - will follow KUB and discuss with intensivist    Suspect coronary artery disease  -had stress test at [facility] results pending -     Possible DM2  -ssi + poc bs for now  -may need coverage scheduled while on steroids    We do not have medications on record and therefore suspect we will need to augment once records become available. Patient remains acutely/critically ill with elevated risk for furhter decompensation. To ICU. I have personally reviewed the radiographs, laboratory data in Epic and decisions and statements above are based partially on this personal interpretation. Code Status: Full Code  DVT Prophylaxis: Hep SQ  GI Prophylaxis: not indicated        Subjective:   CHIEF COMPLAINT: patient unconscious in room on bipap    HISTORY OF PRESENT ILLNESS:     Larisa Montanez is a 61 y.o.  male with known history as listed below presents to ED with complaint noted above. Available records were reviewed at the time of H&P. Patient with unclear PMH given no records in our system. Per review of ED records and discussion with ED MD given patient is unconscious and likely near intubation and no family/loved ones in room. Records from [place] being reviewed and in brief he is following pulmonary for his \"chronic\" white out of left lung since 2/2017. He has undergone multiple bronchoscopies and antibiotics for this without improvement  -I do not have results of these tests at this time. Girlfriend mentioned to ED MD that the patient has not been taking oxygen, nor medications, nor follow up as he is supposed to and he attempts to portray a \"picture of health\". Per review of records he had aflutter 4:1 on 12/29. EF per records up to 37% from 25% prior. He presented to [address] [facility] for SOB.  In ED initially with tachypnea and was placed on VM. There was concern that this respiratory picture could represent HAP (recently at [facility]) and therefore started on HAP abx and \"sepsis\" protocol. No fluid bolus due to known CHF ef 25-30%. Unfortunately continued to worsen with increased work of breathing, increased abdominal distension. Repeat ABG showed hypercapnia and pH 7.0 - he was intubated. Copious output from OGT. We were asked to admit for work up and evaluation of the above problems. History reviewed. No pertinent past medical history. Patient with AMS, no family in room, nobody picking up phone of emergency numbers. No past surgical history on file. Patient with AMS, no family in room, nobody picking up phone of emergency numbers. Social History   Substance Use Topics    Smoking status: Not on file    Smokeless tobacco: Not on file    Alcohol use Not on file      Family History   Problem Relation Age of Onset    Hypertension Other     *Patient with AMS, no family in room, nobody picking up phone of emergency numbers. No Known Allergies     Prior to Admission medications    Not on File     Patient with AMS, no family in room, nobody picking up phone of emergency numbers.     REVIEW OF SYSTEMS:  See HPI for details  Patient was not able to provide review of systems due to mental status change/acute illness    Objective:   VITALS:    Visit Vitals    BP (!) 79/57    Pulse 78    Temp 97.7 °F (36.5 °C)    Resp 17    Ht 5' 7\" (1.702 m)    Wt 72.6 kg (160 lb)    SpO2 98%    BMI 25.06 kg/m2     PHYSICAL EXAM:     GENERAL:    WD y   WN y   Cachectic    Thin    Obese y   Disheveled y   Ill Appearing Critically y   Ill Appearing Chronically y   Acute Distress y respiratory, neurologic   Other      HEENT:    NC/AT/EOMI y   PERRLA y   Conjunctivae Pink    Conjunctivae Pale y   Moist Mucosa    Dry Mucosa y   Hearing intact to voice    Other      NECK:    Supple y   Masses n   Thyroid Tender n   Other RESPIRATORY:    CTA bilaterally WITHOUT wheezing/rhonchi/rales or crackles    Wheezing n   Rhonchi y   Crackles y   Use of accessory muscles y   Other      CARDIAC:    regular rate and rhythm No murmurs/rubs/gallops    Murmur n   Rubs n   Gallops n   Rate Regular/Irregular irreg irreg, rapid rate   Carotid Bruit Left/Right n   Lower Extremity Edema 1+   JVP     Other Normal capillary refill     ABDOMEN:    Soft non distended non tender +bowel sounds no HSM    Rigid n   Tenderness n   Hepatomegaly n   Splenomegaly n   Distended y with tympany   Increased girth due to habitus    Normal/Hyper/Hypo Active Bowel Sounds hypo   Other      SKIN / MUSCULOSKELETAL:    Rashes n   Ecchymosis n   Ulcers    Tight to palpitation    Turgor Good/Poor poor   Cyanosis/Clubbing n   Amputation(s)    Other      NEUROLOGY:    cranial nerves II-XII grossly intact    Cranial Nerve Deficit Poor gag. No threat   Facial Droop    Slurred Speech    Aphasia y   Strength Normal    Weakness    Meningismus/Kernig's Sign/ Brudzinsky n   Follows Commands n   Other      PSYCHIATRIC:    AAOx3 in no acute distress    Insight Poor y   Insight Good    Alert and Oriented to Person  n   Alert and Oriented to Place n   Alert and Oriented toTime n   Depressed    Anxious    Agitated    Lethargic    Stuporous y   Sedated    Other    _______________________________________________________________________  Care Plan discussed with:    Comments   Patient x Discussed in room with patient but he was AMS. Family   GF had been in room, not now, not picking up phone, not in waiting room   RN x    Care Manager                    Consultant:  deann ED MD 20   _______________________________________________________________________  Recommended Disposition:   Home with Family    HH/PT/OT/RN y   SNF/LTC y   [de-identified]    ________________________________________________________________________  TOTAL TIME:   Minutes    Critical Care Provided   45  Minutes non procedure based.  I have provided critical care time. During this entire length of time I was immediately available to the patient. The reason for providing this level of medical care was due to a critical illness that impaired one or more vital organ systems, such that there was a high probability of imminent or life threatening deterioration in the patient's condition. This care involved high complexity decision making which includes reviewing the patient's past medical records, current laboratory results, and actual Xray films in order to assess, support vital system function, and to treat this degree of vital organ system failure, and to prevent further life threatening deterioration of the patients condition. Comments   >50% of visit spent in counseling and coordination of care x Chart review  Discussion with patient and/or family and questions answered     ________________________________________________________________________  Signed: Sabine Kim MD    This note will not be viewable in [address] Procedures: see electronic medical records for all procedures/Xrays and details which were not copied into this note but were reviewed prior to creation of Plan. LAB DATA REVIEWED:    Recent Results (from the past 24 hour(s))   CBC WITH AUTOMATED DIFF    Collection Time: 01/01/18 12:25 AM   Result Value Ref Range    WBC 32.3 (H) 4.1 - 11.1 K/uL    RBC 5.81 (H) 4.10 - 5.70 M/uL    HGB 18.4 (H) 12.1 - 17.0 g/dL    HCT 55.0 (H) 36.6 - 50.3 %    MCV 94.7 80.0 - 99.0 FL    MCH 31.7 26.0 - 34.0 PG    MCHC 33.5 30.0 - 36.5 g/dL    RDW 14.4 11.5 - 14.5 %    PLATELET 272 674 - 956 K/uL    NEUTROPHILS 88 %    BAND NEUTROPHILS 1 %    LYMPHOCYTES 5 %    MONOCYTES 5 %    EOSINOPHILS 0 %    BASOPHILS 0 %    MYELOCYTES 1 %    ABS. NEUTROPHILS 28.7 K/UL    ABS. LYMPHOCYTES 1.6 K/UL    ABS. MONOCYTES 1.6 K/UL    ABS. EOSINOPHILS 0.0 K/UL    ABS.  BASOPHILS 0.0 K/UL    RBC COMMENTS NORMOCYTIC, NORMOCHROMIC      DF MANUAL     METABOLIC PANEL, COMPREHENSIVE    Collection Time: 01/01/18 12:25 AM   Result Value Ref Range    Sodium 138 136 - 145 mmol/L    Potassium 4.6 3.5 - 5.1 mmol/L    Chloride 94 (L) 97 - 108 mmol/L    CO2 42 (HH) 21 - 32 mmol/L    Anion gap 2 (L) 5 - 15 mmol/L    Glucose 83 65 - 100 mg/dL    BUN 22 (H) 6 - 20 MG/DL    Creatinine 1.07 0.70 - 1.30 MG/DL    BUN/Creatinine ratio 21 (H) 12 - 20      GFR est AA >60 >60 ml/min/1.73m2    GFR est non-AA >60 >60 ml/min/1.73m2    Calcium 8.8 8.5 - 10.1 MG/DL    Bilirubin, total 0.4 0.2 - 1.0 MG/DL    ALT (SGPT) 40 12 - 78 U/L    AST (SGOT) 35 15 - 37 U/L    Alk.  phosphatase 99 45 - 117 U/L    Protein, total 7.9 6.4 - 8.2 g/dL    Albumin 3.2 (L) 3.5 - 5.0 g/dL    Globulin 4.7 (H) 2.0 - 4.0 g/dL    A-G Ratio 0.7 (L) 1.1 - 2.2     LACTIC ACID    Collection Time: 01/01/18 12:25 AM   Result Value Ref Range    Lactic acid 0.8 0.4 - 2.0 MMOL/L   NT-PRO BNP    Collection Time: 01/01/18 12:25 AM   Result Value Ref Range    NT pro-BNP 4443 (H) 0 - 125 PG/ML   EKG, 12 LEAD, INITIAL    Collection Time: 01/01/18 12:33 AM   Result Value Ref Range    Ventricular Rate 150 BPM    Atrial Rate 150 BPM    P-R Interval 120 ms    QRS Duration 138 ms    Q-T Interval 304 ms    QTC Calculation (Bezet) 480 ms    Calculated P Axis 115 degrees    Calculated R Axis 132 degrees    Calculated T Axis -66 degrees    Diagnosis       ** Suspect arm lead reversal, interpretation assumes no reversal  Sinus tachycardia  Right bundle branch block  Lateral infarct , age undetermined  T wave abnormality, consider inferior ischemia  Abnormal ECG  No previous ECGs available     BLOOD GAS, ARTERIAL    Collection Time: 01/01/18 12:56 AM   Result Value Ref Range    pH 7.23 (LL) 7.35 - 7.45      PCO2 111 (H) 35.0 - 45.0 mmHg    PO2 71 (L) 80 - 100 mmHg    O2 SAT 90 (L) 92 - 97 %    BICARBONATE 45 (H) 22 - 26 mmol/L    BASE EXCESS 11.9 mmol/L    O2 METHOD BIPAP      FIO2 90 %    SET RATE 4      SPONTANEOUS RATE 32      IPAP/PIP 14 EPAP/CPAP/PEEP 5      Sample source ARTERIAL      SITE LEFT RADIAL      SYBIL'S TEST YES      Critical value read back CVRB TO Kraig Mariano MD @ 0103    TROPONIN I    Collection Time: 01/01/18  5:16 AM   Result Value Ref Range    Troponin-I, Qt. 0.05 (H) <0.05 ng/mL   CK W/ REFLX CKMB    Collection Time: 01/01/18  5:16 AM   Result Value Ref Range    CK 40 39 - 308 U/L   BLOOD GAS, ARTERIAL    Collection Time: 01/01/18  5:30 AM   Result Value Ref Range    pH 7.07 (LL) 7.35 - 7.45      PCO2 135 (H) 35.0 - 45.0 mmHg    PO2 70 (L) 80 - 100 mmHg    O2 SAT 84 (L) 92 - 97 %    BICARBONATE 39 (H) 22 - 26 mmol/L    BASE EXCESS 3.3 mmol/L    O2 METHOD BIPAP      FIO2 90 %    Sample source ARTERIAL      SITE RIGHT RADIAL      SYBIL'S TEST YES      Critical value read back Willie Tavares RN

## 2018-01-01 NOTE — ROUTINE PROCESS
TRANSFER - IN REPORT:  Verbal report received from AMEENA Gonzalez RN(name) on Haroon Michele  being received from the Emergency department(unit) for routine progression of care    Report consisted of patients Situation, Background, Assessment and   Recommendations(SBAR). Information from the following report(s) SBAR, Kardex, ED Summary, Procedure Summary, Intake/Output, MAR, Accordion, Recent Results, Med Rec Status and Cardiac Rhythm Atrial Flutter was reviewed with the receiving nurse  Opportunity for questions and clarification was provided. Assessment completed upon patients arrival to unit and care assumed. 0915:Transferred to the CCU via stretcher. His eyes are open, following simple commands. The mechanical ventilator is in use: Et tube 7.5/ 22 at the lip. ABG's noted. .Puralent sputum sent to the laboratory as ordered by the physician. Oral gastric tube is to low wall suction. The Granados catheter is maintained for strict Intake and Output. Skin is intact  0935:Noted agitated, with the ET-Tube, Ativan administered for relaxation  1005: He is resting comfortable with his eyes closed, tolerating the Ventilator settings well.  1100:FentaNyl given for relaxation, Suctioned for a large amount of milky sputum and repositioned for comfort. 1200:ABG results noted by , continue the current plan of care. His significant other, Cary Becerril is at the bedside. Advance Medical Directive, and Power of  papers provided. 1305:Amiodarone bolus intiated. 1350: ABG results noted by , see new orders. Amiodarone drip in use at 1mg/minute  1440:Propofol started at 15mcq/kg/min. Family members at the bedside. Frequent suctioning of thick purulent sputum. 1445:Vecuroninum bolus given per MD order  1545:Noted with an elevated heart rate (148) then bradycardia rhythm (50), I received orders from  to decrease the Amiodarone drip to half. Levophed increased to 20mcq/min.  and family in at the bedside. See DNR order. 1630: The cardiac monitor denotes a rapid Atrial Flutter, Amiodarone restarted at 0.5mg/min  1700:Sodium Bicarbonate initiated and drip started. 1850: His blood pressure remains labile, Levophed and Propofol adjusted. 6408: His systolic Blood pressure is reading <90 mmHg, therefore I decreased the Propofol to 15mcq/kg/min, and increased the Levophed to 15 mcq/min  1930:Bedside and Verbal shift change report given to ANNIE Posadas RN (oncoming nurse) by myself (offgoing nurse). Report included the following information SBAR, Kardex, ED Summary, Procedure Summary, Intake/Output, MAR, Accordion, Recent Results, Med Rec Status and Cardiac Rhythm rapid atrial flutter persist..

## 2018-01-02 PROBLEM — N28.9 ACUTE RENAL INSUFFICIENCY: Status: ACTIVE | Noted: 2018-01-01

## 2018-01-02 PROBLEM — I50.23 ACUTE ON CHRONIC SYSTOLIC HF (HEART FAILURE) (HCC): Status: ACTIVE | Noted: 2018-01-01

## 2018-01-02 PROBLEM — R14.0 ABDOMINAL DISTENTION: Status: ACTIVE | Noted: 2018-01-01

## 2018-01-02 PROBLEM — R06.02 SOB (SHORTNESS OF BREATH): Status: ACTIVE | Noted: 2018-01-01

## 2018-01-02 NOTE — DIABETES MGMT
DTC Progress Note    Recommendations/ Comments: Pt discussed with rounding team and Dr. Milton Amaya. Plan to begin NPH 8units Q8hrs timed with solu-cortef. Chart reviewed on Deep Michele during Multidisciplinary Rounds. A1c:   Lab Results   Component Value Date/Time    Hemoglobin A1c 9.0 01/02/2018 04:12 AM           Recent Glucose Results:   Lab Results   Component Value Date/Time     (H) 01/02/2018 04:12 AM    GLUCPOC 308 (H) 01/02/2018 06:31 AM    GLUCPOC 270 (H) 01/02/2018 12:19 AM    GLUCPOC 133 (H) 01/01/2018 11:10 AM        Lab Results   Component Value Date/Time    Creatinine 1.89 01/02/2018 04:12 AM     Estimated Creatinine Clearance: 45.3 mL/min (based on Cr of 1.89). Active Orders   Diet    DIET NPO        PO intake: No data found. Will continue to follow as needed. Thank you.   EMANUEL VelázquezN, RN, [address]

## 2018-01-02 NOTE — PROGRESS NOTES
0715 Report received from Noy, [facility].    2228 Assessment completed. Patient currently intubated on ventilator for airway support. Patient at times opens eyes spontaneously, not able to follow commands. Patient is currently sedated on Propofol IV and being titrated to maintain a RASS of 0 to -1. Amiodarone IV infusing at 0.5mg/min per order. Levophed IV infusing at 15mcg/min and being titrated to maintain a SBP>90. Patient currently in [address] with a rate in 150s, Dr. Amaya Cantrell aware. OG tube intact and placement verified. Granados catheter intact draining veronika colored urine. Will closely monitor. 0830 Dr. Debra Wilson in room to see and assess patient. 7990 Dr. Amaya Cantrell in to see and assess patient. Per Dr. Amaya Cantrell, patient will have bedside FLORIAN and Cardioversion this afternoon. 1120 Dr. Debra Wilson made aware of patient oxygen saturation dropping to 89% at times, new orders for adjustments to ventilator received. 1200 Assessment unchanged. All needs met. 1210 PICC Team in room for insertion of ordered PICC line. 3364 [facility] lab in room for scheduled FLORIAN and cardioversion. 1320 FLORIAN and Cardioversion done by Dr. Amaya Cantrell. Patient now in NSR/Sinus Tachycardia with rate ranging from 90 to 110.    1610 Assessment unchanged. No signs of acute distress noted. Will continue to monitor. 1920 Report given to NIMISHA Cummins.

## 2018-01-02 NOTE — PROCEDURES
[address] [facility]    Raj Dong  MR#: [id number]  : 1960  ACCOUNT #: [de-identified]   DATE OF SERVICE: 2018    INDICATIONS:  Atrial flutter. MEDICATIONS USED:  None. DESCRIPTION OF PROCEDURE:  After obtaining informed consent from the family, procedure was performed at bedside in ICU. Patient was already on sedation and on ventilator. After performing a transesophageal echocardiogram to rule out left atrial appendage thrombus, synchronized, biphasic electricity using 150 joules x1 was utilized. The patient converted to sinus rhythm. CONCLUSIONS:  Successful cardioversion. COMPLICATIONS:  None. ESTIMATED BLOOD LOSS:  None. SPECIMENS REMOVED:  None.       Fidel Islas MD       PVR / HN  D: 2018 15:00     T: 2018 15:34  JOB #: [id number]

## 2018-01-02 NOTE — PROGRESS NOTES
[address] [address]  [phone]      Cardiology Progress Note      1/2/2018 8:30 AM    Admit Date: 1/1/2018    Admit Diagnosis:   Acute encephalopathy    Subjective:     Cookie Michele remains intubated/sedated and rapid aflutter on IV amio. Remains on Levophed, slowly weaning. WBC 32.     Visit Vitals    /72    Pulse (!) 152    Temp 99.6 °F (37.6 °C)    Resp 30    Ht 5' 7\" (1.702 m)    Wt 88.7 kg (195 lb 8.8 oz)    SpO2 (!) 89%    BMI 30.63 kg/m2       Current Facility-Administered Medications   Medication Dose Route Frequency    ondansetron (ZOFRAN) injection 4 mg  4 mg IntraVENous Q4H PRN    labetalol (NORMODYNE;TRANDATE) injection 10 mg  10 mg IntraVENous Q2H PRN    albuterol-ipratropium (DUO-NEB) 2.5 MG-0.5 MG/3 ML  3 mL Nebulization Q2H PRN    0.9% sodium chloride infusion  75 mL/hr IntraVENous CONTINUOUS    albuterol-ipratropium (DUO-NEB) 2.5 MG-0.5 MG/3 ML  3 mL Nebulization Q4H RT    levoFLOXacin (LEVAQUIN) 750 mg in D5W IVPB  750 mg IntraVENous Q24H    cefepime (MAXIPIME) 2 g in 0.9% sodium chloride (MBP/ADV) 100 mL  2 g IntraVENous Q8H    sodium chloride (NS) flush 5-10 mL  5-10 mL IntraVENous Q8H    sodium chloride (NS) flush 5-10 mL  5-10 mL IntraVENous PRN    hydrocortisone Sod Succ (PF) (SOLU-CORTEF) injection 50 mg  50 mg IntraVENous Q8H    fluconazole (DIFLUCAN) 400mg/200 mL IVPB (premix)  400 mg IntraVENous DAILY    vancomycin (VANCOCIN) 1,000 mg in 0.9% sodium chloride (MBP/ADV) 250 mL  1,000 mg IntraVENous Q12H    famotidine (PF) (PEPCID) 20 mg in sodium chloride 0.9 % 10 mL injection  20 mg IntraVENous Q12H    fentaNYL citrate (PF) injection  mcg   mcg IntraVENous Q2H PRN    LORazepam (ATIVAN) injection 1 mg  1 mg IntraVENous Q4H PRN    chlorhexidine (PERIDEX) 0.12 % mouthwash 15 mL  15 mL Oral Q12H    mupirocin calcium (BACTROBAN) 2 % cream   Topical BID    enoxaparin (LOVENOX) injection 70 mg  1 mg/kg SubCUTAneous Q12H    amiodarone (CORDARONE) 375 mg/250 mL D5W infusion  0.5 mg/min IntraVENous CONTINUOUS    metroNIDAZOLE (FLAGYL) IVPB premix 500 mg  500 mg IntraVENous Q12H    propofol (DIPRIVAN) infusion  0-50 mcg/kg/min IntraVENous TITRATE    sodium bicarbonate (8.4%) 150 mEq in sterile water 1,000 mL infusion   IntraVENous CONTINUOUS    NOREPINephrine (LEVOPHED) 8 mg in 5% dextrose 250mL infusion  2-30 mcg/min IntraVENous TITRATE    insulin lispro (HUMALOG) injection   SubCUTAneous Q6H    glucose chewable tablet 16 g  4 Tab Oral PRN    dextrose (D50W) injection syrg 12.5-25 g  12.5-25 g IntraVENous PRN    glucagon (GLUCAGEN) injection 1 mg  1 mg IntraMUSCular PRN       Objective:      Physical Exam:  General Appearance: [place] male intubated  Chest:   Coarse rhonchi throughout  Cardiovascular:  tachy no murmur.   Abdomen:   Soft, non-tender, bowel sounds are active.   Extremities: no peripheral edema  Skin:  Warm and dry.     Data Review:   Recent Labs      01/02/18   0412  01/01/18   0025   WBC  32.4*  32.3*   HGB  14.9  18.4*   HCT  48.6  55.0*   PLT  134*  239     Recent Labs      01/02/18   0412  01/01/18   0025   NA  136  138   K  5.4*  4.6   CL  98  94*   CO2  31  42*   GLU  331*  83   BUN  43*  22*   CREA  1.89*  1.07   CA  7.9*  8.8   MG  2.0   --    PHOS  3.7   --    ALB  2.1*  3.2*   TBILI  0.4  0.4   SGOT  60*  35   ALT  62  40       Recent Labs      01/02/18   0412  01/01/18   0516   TROIQ  0.10*  0.05*   CPK  34*   --    CKMB  1.4   --          Intake/Output Summary (Last 24 hours) at 01/02/18 1146  Last data filed at 01/02/18 1100   Gross per 24 hour   Intake          5949.16 ml   Output              840 ml   Net          5109.16 ml        Telemetry: aflutter with RVR      Assessment:     Principal Problem:    Acute respiratory failure (HCC) (1/1/2018)    Active Problems:    Acute encephalopathy (1/1/2018)      Typical atrial flutter (HCC) (1/1/2018)      Acute on chronic systolic HF (heart failure) ([facility] (1/2/2018)        Plan:     Aflutter with RVR:  Rate remains uncontrolled at 150s. Plan for FLORIAN and cardioversion today. Consent to be obtained from family  Continue on IV amio  Continue on Lovenox BID. CHADS2 vasc Score: 2. Continue to wean pressors as tolerated    Acute on chronic systolic HF:  Echo pending  Request for [facility] records pending  According to significant other, patient not compliant with medications, diet, f/u appointments  Monitor I/Os, daily weights, labs          [facility]    1/2/2018         Agree with note as outlined by  NP. I confirm findings in history and physical exam. No additional findings noted. Agree with plan as outlined above. Continues to be tachycardic despite IV amiodarone. Will proceed with [facility].     Swetha Pierce MD

## 2018-01-02 NOTE — CDMP QUERY
Account Number: [de-identified]  MRN: [id number]  Patient: Gamaliel Melara  Created: [id number]  Clinician Name: Yaw Holloway RN, CCDS    Dr. Maximus Abarca :      Please clarify if this patient is being treated/managed for:    =>  Shock, type (hypovolemic, septic, cardiogenic) in the setting of possible sepsis   =>Other Explanation of clinical findings  =>Unable to Determine (no explanation of clinical findings)    The medical record reflects the following clinical findings, treatment, and risk factors:    Risk Factors: infection, known CHF EF  25-30%, pneumonia, respiratory failure, aflutter   Clinical Indicators:  57WM admitted w/ sepsis d/t Pneumonia, pt unconscious, on mechanical ventilation, low BP 69/44, MAP 51-49 in ED, lethargic. Treatment: Levophed, IVF, O2 support, IV abx, ICU management. Please clarify and document your clinical opinion in the progress notes and discharge summary including the definitive and/or presumptive diagnosis, (suspected or probable), related to the above clinical findings. Please include clinical findings supporting your diagnosis.     Thank Byron Bates RN, CCDS

## 2018-01-02 NOTE — PROGRESS NOTES
ASA 4 and Mallampati 4 per Dr. Myranda Barry    Pt already sedated with Diprivan prior to procedure. FLORIAN completed    1332- pt given 1 synchronized shock(s) at 150 Joules, Aflutter converted to Sinus tachycardia with PVCs.

## 2018-01-02 NOTE — PROGRESS NOTES
Pharmacy  Enoxaparin (Lovenox®) Monitoring/Dosing      Indication: A. Flutter with RVR     Current Dose: Enoxaparin 70 mg subcutaneously every 12 hours    Creatinine Clearance (mL/min): 29-40 mL/min    Current Weight: 88.7 kg    Labs:  Recent Labs      01/02/18   0412  01/01/18   0025   CREA  1.89*  1.07   HGB  14.9  18.4*   PLT  134*  239     Wt Readings from Last 1 Encounters:   01/01/18 88.7 kg (195 lb 8.8 oz)     Ht Readings from Last 1 Encounters:   01/01/18 170.2 cm (67\")       Impression/Plan:   - Patient with DEISI, and SCr ester from 1.07 on 1/1/18 to 1.89 on 1/2/18. Estimated CrCl is currently 29-40 mL/min.  - Given DEISI and rapid SCr rise, enoxaparin dose adjusted to 1 mg/kg every 24 hours. Discussed with Dr Raghu Ash on 1/2/18 CCU rounds. - Will follow SCr closely and dose adjust enoxaparin as needed on 1/3/18.      Thanks,  Hazel Gregorio, PHARMD

## 2018-01-02 NOTE — PROGRESS NOTES
[facility] INTENSIVIST Consult Service Note  Pulmonary, Critical Care, and Sleep Medicine    Name: Leonardo Kang MRN: [id number]   : 1960 Hospital: [facility]   Date: 2018   Hospital Day: 2       Subjective/Interval History:   I have reviewed the notes from other providers and old records readily available and summarized findings below with the flowsheet. Seen earlier today on rounds. Patient Active Problem List   Diagnosis Code    Acute encephalopathy G93.40    Acute respiratory failure ([address] J96.00    Typical atrial flutter (HCC) I48.3     3:25 PM  Poor gas exchange. Essentially single ( or less) lung ventilation with mostly dead space. Conventional support barely adequate. Updated wife and best friend elma who share [facility] responsibilities. I reached out to , [facility] to discuss management options. Appreciate his time and quidance. ECMO was considered for salvage and support but given pt's long xiong with an unknown left lobar infiltrative process, likelihood of full recovery unlikley. Family was clear when I conveyed the options that pt would not consider portable oxygen or other dependency any quality of life. Decision made to continue with conventional and conservative measures. They agree to make pt a DNR per his wishes and that would include no additional pressors( already on levophed, no shock, no CPR. Will see how he does overnight but is tenuous. Will start low dose IV bicarb and allow permissive hypercapnea. 18 HR still > 150 despite med management. ABG reviewed and d/w RT, nursing. IV bicarb. Cr up. Needs PICC. Unstable but gas exchange marginal as expected. Family still knows pt is critical but staying the course for know and trying to treat what might be treatable to buy a little more time for pt and meds. IMPRESSION:     1.  Acute and possible chronic respiratory failure with severe hypoxia and severe hypercarbia- noting his erytrhocytosis could be from long standing hypoxia, very difficult to ventilate-marginal but not much worse than yeasterday  2. Severe unresolved and now worsening atypical bilateral penumonia- not just white out of left lung but also RLL and RMl involvement on post intubation CXR- asymmetric left >> right but almost pan lobar- will likely worsen; showed CXRs to wife at bedside; for now sparing RUL but for how long? Had bronch at the [place] and only E coli treated? 3. DEISI from sepsis and shock- likely ATN  4. Atrail filbrillation, flutter with RVR despite IV amio- [facility] after FLORIAN by cardiology- discussed with pts' Rush Mishra who is agreeable to trying  5. Shock still dependent on IV levophed- hoping [facility] will help-   6. Poor IV access- mPOA agrees  7. Cardiomyopathy LVEF 37% per -had stress test at [facility]   8. possible DM2  9. possisble Rheumatoid arthritis per wife and only just recently given prednisone  10. Medical non complinance- unclear if he ever really took the meds sent to him, wife noted he refused some and declined home O2  11. Pt is requiring Drug therapy requiring intensive monitoring for toxicity  12. Pt is critically ill and at high risk of end organ dysfunction and failure      RECOMMENDATIONS/PLAN:   1. CCU  2. [facility] after FLORIAN if able  3. Sedate  4. PICC - he would not want dialysis if he worsens based on my discussio with family- needs secure access  5. CXR  6. Adjust vent  7. Monitor UOP  8. pallaitive care consult to support family only  5. May need to paralyze if too much dyssynchrony  10. IV levophed  11. IV fluids  12. IV abx will be adjusted per renajma fnction  13. Follow sputum for cultures  14. Bronchial hygiene  15. Pt needs IV fluids with additives and Drug therapy requiring intensive monitoring for toxicity  16. Prescription drug management with home med reconciliation done  17. DVT, SUP prophylaxis  18.  Patient requiring restraints due to threat of injury to self, interference with medical devices. 19. Will be available to assist in medical management while in the CCU pending disposition          My assessment/management was discussed with:  Nursing XX    respiratory therapy XX  XX   Family XX wife      I have provided   35    minutes of critical care time rendering care exclusive of any procedures. During this entire length of time I was immediately available to the patient.      The reason for providing this level of medical care was due to a critical illness that impaired one or more vital organ systems, such that there was a high probability of imminent or life threatening deterioration in the patient's condition. Pt's condition is unstable and unpredictable. This care involved high complexity decision making which includes independently reviewing the patient's past medical records, current laboratory results, medication profiles that were immediately available to me and actual Xray images at the bedside in order to assess, support vital system function, and to treat this degree of vital organ system failure, and to prevent further life threatening deterioration of the patients condition. I was in direct communication with the nursing staff throughout this time. I have personally and independently reviewed the patients interval diagnostic lab data, radiographs and the reports. I have ordered additional labs to follow the current medical conditions and to monitor treatment responses over the next 24 hours or sooner if needed. I will order additional imaging to follow longitudinal changes found on the most current imaging. Risk of deterioration: high   [x] High complexity decision making was performed  [x] See my orders for details  Tubes:   Granados and Intubated/Vent  ICU disposition :Unchanged. Updated Family. Time 1:20 PM      Code: Partial Code        Subjective/Initial History:   I have reviewed the flowsheet and previous days notes.  Seen earlier today on rounds. I was asked by Teofilo Enriquez MD to see Luis Manuel Lorenzo in consultation for a chief complaint of severe bilateral pneumonia    Marc Michele, 61 y.o. male retired  per wife followed at the [address] [facility], with PMHx significant for COPD, CHF, HTN, DM, and breathing difficulties for almost one year presents via EMS to the ED with cc of worsening SOB over the past few days. Per EMS, pt was en route to the [address] [place] [address] but was taken here for immediate care because this was the closest hospital. Per medical records, pt has been recently evaluated several times this past month at the [address] [facility] for worsening SOB. He was found in February 2017 to have almost complete white out of his left lung. He was lost to follow-up but over past month has been admitted for antibiotics, had two bronchoscopies, and is scheduled for a wedge resection of his left lung to determine etiology of the above. One culture grew E Coli and there was some question by his wife about possible fungal infection (but this was not verified on OSH records). Pt also had a stress test Friday. Per patient it was normal. However, records indicate he had an abnormal stress test without reversible defect and an EF of 37% which was up from last noted of 25-30% one year ago. Per records, he was also in [address] [facility] [address] 4:1 on 12/29 as well. Per EMS pt was using his oxygen concentrator at 6 but did not have any oxygen tanks. He did not want them at time of discharge per wife. Patient tries to portray a picture of health and does not always see through his follow-up. She states the oxygen is a good example \"where he just didn't want to admit he needed it. \" she notes others have noted him not looking well thepast three months. He was discharged frm the [facility] in December and was offered home o2 which he declined. Last week he was well enough to run errands. Last night he had some emesis but non bloody. Pt was coughing up copious purulent sputum.  Became hypotensive in the ER. Was hypoxic and severely hypercapneic and intubated in the ER. Discussed with dr. Tamara Allison and CCU nurising . Pt also being seen by Cardiology for atrail flutter. arrrived getting IV levophed and IV fluid bolus. Very restless, agitated pulling at tubing and resisting staff. Before intubation the pt denied fevers, CP, leg swelling, coughing or known sick contacts. He is on Prednisone at this time. PMH:  has no past medical history on file. PSH:   has no past surgical history on file. FHX: family history includes Hypertension in an other family member. SHX:    The patient is unable to give any meaningful history or review of systems due to patient factors. ROS:Review of systems not obtained due to patient factors.     No Known Allergies     MAR reviewed and pertinent medications noted or modified as needed  MEDS:   Current Facility-Administered Medications   Medication    ondansetron (ZOFRAN) injection 4 mg    labetalol (NORMODYNE;TRANDATE) injection 10 mg    albuterol-ipratropium (DUO-NEB) 2.5 MG-0.5 MG/3 ML    0.9% sodium chloride infusion    albuterol-ipratropium (DUO-NEB) 2.5 MG-0.5 MG/3 ML    levoFLOXacin (LEVAQUIN) 750 mg in D5W IVPB    cefepime (MAXIPIME) 2 g in 0.9% sodium chloride (MBP/ADV) 100 mL    sodium chloride (NS) flush 5-10 mL    sodium chloride (NS) flush 5-10 mL    hydrocortisone Sod Succ (PF) (SOLU-CORTEF) injection 50 mg    fluconazole (DIFLUCAN) 400mg/200 mL IVPB (premix)    vancomycin (VANCOCIN) 1,000 mg in 0.9% sodium chloride (MBP/ADV) 250 mL    famotidine (PF) (PEPCID) 20 mg in sodium chloride 0.9 % 10 mL injection    fentaNYL citrate (PF) injection  mcg    LORazepam (ATIVAN) injection 1 mg    chlorhexidine (PERIDEX) 0.12 % mouthwash 15 mL    mupirocin calcium (BACTROBAN) 2 % cream    enoxaparin (LOVENOX) injection 70 mg    amiodarone (CORDARONE) 375 mg/250 mL D5W infusion    metroNIDAZOLE (FLAGYL) IVPB premix 500 mg    propofol (DIPRIVAN) infusion    sodium bicarbonate (8.4%) 150 mEq in sterile water 1,000 mL infusion    NOREPINephrine (LEVOPHED) 8 mg in 5% dextrose 250mL infusion    insulin lispro (HUMALOG) injection    glucose chewable tablet 16 g    dextrose (D50W) injection syrg 12.5-25 g    glucagon (GLUCAGEN) injection 1 mg        Objective:     Vital Signs: Intake/Output: Intake/Output:   Temp (24hrs), Av.6 °F (37.6 °C), Min:98.9 °F (37.2 °C), Max:100.5 °F (38.1 °C)    Visit Vitals    /72    Pulse (!) 150    Temp 99.6 °F (37.6 °C)    Resp 30    Ht 5' 7\" (1.702 m)    Wt 88.7 kg (195 lb 8.8 oz)    SpO2 91%    BMI 30.63 kg/m2          Telemetry:    atrial fluttter O2 Device: Endotracheal tube, Ventilator  O2 Flow Rate (L/min): 15 l/min    Wt Readings from Last 4 Encounters:   18 88.7 kg (195 lb 8.8 oz)          Intake/Output Summary (Last 24 hours) at 18 0925  Last data filed at 18 0900   Gross per 24 hour   Intake          5284.16 ml   Output              740 ml   Net          4544.16 ml     Last shift:       07 -  1900  In: 715 [I.V.:715]  Out: 120 [Urine:120]  Last 3 shifts: 1901 -  0700  In: 4569.2 [I.V.:4509.2]  Out: 1370 [Urine:920]     Hemodynamics:    CO:    CI:    CVP:    SVR:   PAP Systolic:    PAP Diastolic:    PVR:    GF45:        Ventilator Settings:      Mode Rate TV Press PEEP FiO2 PIP Min. Vent   Pressure control    250 ml    6 cm H20 100 %  33 cm H2O  11.2 l/min      Physical Exam:     General: severely ill WM on vent   HEAD: Normocephalic, without obvious abnormality, atraumatic   EYES: conjunctivae clear. PERRL,  AN Icteric sclerae   NOSE:  nares normal, no drainage, no flaring,    THROAT: intubated; Lips, mucosa dry; ? Oral hygiene;     Neck: Supple, symmetrical, trachea midline,  No accessory mm use; No Stridor/ cuff leak, No goiter or thyroid tenderness   LYMPH: No abnormally enlarged lymph nodes.  in neck or groin   Chest: increased AP diameter   Lungs: rales bilaterally and rhonchi   Heart: Regular rate and rhythm  No edema   Abdomen: soft, non-tender, without masses or organomegaly, protuberant   : normal;     Granados   Extremity: negative, clubbing   Neuro: obtunded Motor 4 / 5 symmetric; withdraws to pain; unable to check gait and station   Psych:  Unable to assess; 2-3+ agitation   Skin: Warmno lesions noted and no edema;    Pulses:Bilateral, Radial, 1+   Capillary refill: abnormal:  sluggish capillary refill,      Data:   Interval lab and diagnostic data was reviewed. Interval radiology images were independently viewed and available reports were reviewed. All lab results for the last 24 hours reviewed.           Labs:    Recent Labs      01/02/18   0412  01/01/18   0025   WBC  32.4*  32.3*   HGB  14.9  18.4*   PLT  134*  239     Recent Labs      01/02/18   0412  01/01/18   0025   NA  136  138   K  5.4*  4.6   CL  98  94*   CO2  31  42*   GLU  331*  83   BUN  43*  22*   CREA  1.89*  1.07   CA  7.9*  8.8   MG  2.0   --    PHOS  3.7   --    LAC   --   0.8   ALB  2.1*  3.2*   SGOT  60*  35   ALT  62  40     Recent Labs      01/02/18   0632  01/01/18   1733  01/01/18   1349   PH  7.25*  7.23*  7.09*   PCO2  71*  79*  127*   PO2  59*  71*  83   HCO3  31*  32*  38*   FIO2  100  100  100     Recent Labs      01/02/18   0412  01/01/18   0516   CPK  34*   --    CKNDX  4.1*   --    TROIQ  0.10*  0.05*     No results found for: BNPP, BNP   Lab Results   Component Value Date/Time    Culture result: PENDING 01/01/2018 09:48 AM    Culture result: NO GROWTH 1 DAY 01/01/2018 12:25 AM     Lab Results   Component Value Date/Time    CK 34 01/02/2018 04:12 AM       No results found for: COLOR, APPRN, SPGRU, ANGELO, PROTU, GLUCU, KETU, BILU, BLDU, UROU, AMBROSE, LEUKU, WBCU, RBCU, UEPI, BACTU, CASTS, UCRY    No results found for: IRON, FE, TIBC, IBCT, PSAT, FERR  No results found for: SR, CRP, HEAVENLY, ANAIGG, RA, RPR, RPRT, VDRLT, VDRLS, TSH, TSHEXT, TSHEXT   No results found for: TOXA1, RPR, HBCM, HBSAG, HAAB, HCAB1, HAAT, G6PD, CRYAC, HIVGT, HIVR, HIV1, HIV12, HIVPC, HIVRPI      Imaging:  I have personally reviewed the patients radiographs and have reviewed the reports:          Results from Hospital Encounter encounter on 01/01/18   XR CHEST PORT   Narrative EXAM:  XR CHEST PORT. INDICATION: Atrial flutter and acute respiratory distress, tube and line  placement. COMPARISON: 1/1/2018. FINDINGS:   A portable AP radiograph of the chest was obtained at 0420 hours. There are  sternal sutures, a valve replacement and surgical clips in the left hilum. Lines and tubes: The patient is on a cardiac monitor. The endotracheal tube and  nasogastric tube are unchanged in position. Lungs: There is complete opacity of the left hemithorax. Pleura: There is no pneumothorax or pleural effusion. Mediastinum: The cardiac and mediastinal contours and pulmonary vascularity are  normal.  Bones and soft tissues: The bones and soft tissues are grossly within normal  limits. Impression IMPRESSION: No significant change. No results found for this or any previous visit. Thank you for allowing us to participate in the care of this patient. We will be happy to follow along with you.     Tanner Ford MD

## 2018-01-02 NOTE — PROGRESS NOTES
Pharmacy Automatic Renal Dosing Protocol - Antimicrobials    Indication for Antimicrobials: Recurrent HAP    Current Regimen of Each Antimicrobial:  Cefepime 2 gm IV every 8 hours (Started 18; Day #2)  Fluconazole 400 mg IV every 24 hours (Started 18; Day #2)  Levofloxacin 750 mg IV every 24 hours (Started 18; Day #2)  Metronidazole 500 mg IV every 12 hours (Started 18; Day #2)  Vancomycin 1000 mg IV every 12 hours (Started 18; Day #2)    Vancomycin Trough: 15-20 mcg/mL    Measured / Extrapolated Vancomycin Level: None    Significant Cultures:   18 Respiratory culture = Results pending  18 Blood culture = No growth x 1 day (Results pending)    Labs:  Recent Labs      18   0412  18   0025   CREA  1.89*  1.07   BUN  43*  22*   WBC  32.4*  32.3*     Temp (24hrs), Av.7 °F (37.6 °C), Min:98.9 °F (37.2 °C), Max:100.5 °F (38.1 °C)    Creatinine Clearance (mL/min) or Dialysis: 29-40 mL/min     No results found for: PCT    Impression/Plan:   - Patient with DEISI and a SCr that ester from 1.07 to 1.89 over the past 24 hours. - Cefepime dose adjusted to 2 gm IV every 12 hours based on renal function and indication.  - Fluconazole dosed appropriately based on indication and renal function. Continue current regimen.  - Levofloxacin dose adjusted to 750 mg IV every 48 hours based on renal function and indication.  - Metronidazole dosed appropriately based on indication and renal function. Continue current regimen. - Due to DEISI, vancomycin random level ordered with AM labs on 1/3/18. Will hold vancomycin until the level results on 1/3/18. Pharmacy will follow daily and adjust medications as appropriate for renal function and/or serum levels.     Thank you,  RIAN RussoD

## 2018-01-02 NOTE — PROGRESS NOTES
Care Management:    Admitted with acute encephalopathy and currently critical on vent and pressors in the ICU. He is for a FLORIAN and cardioversion this afternoon. He lives with his significant other Stefan Baeza and she is his MPOA. He is a retired  and is independent with ADLs including driving. [facility] and his PCP is Dr Cameron Osman. I have sent updates to the [place] and patient is willing to transfer when his is medically stable to do so. He is non-service connected. I spoke with Stefan Baeza on the phone and she said he wanted to go to the [place] but needed to come to the nearest hospital.     [phone] and fax [phone]. Care Management Interventions  PCP Verified by CM: Yes  Mode of Transport at Discharge: Other (see comment) (significant other Stefan Baeza)  Current Support Network: Other (Lives with significant other Stefan Baeza. He was independent with ADLs prior to admission. he is a retired . )  Confirm Follow Up Transport: Family  Plan discussed with Pt/Family/Caregiver: Yes (Spoke with significant other and GLORIA Means on the phone. )     Chart reviewed and we will cont to follow. Will plan to transfer to the [place] when patient is medically stable to do so. This would be an [place] transfer.      Ashwini Ahn [facility] ac [phone]

## 2018-01-02 NOTE — PROGRESS NOTES
TRANSFER - OUT REPORT:    Verbal report given to [facility] on Haroon E Major post FLORIAN and Cardioversion    Report consisted of patients Situation, Background, Assessment and   Recommendations(SBAR). Information from the following report(s) SBAR, Procedure Summary and Cardiac Rhythm Sinus Tachycardia was reviewed with the receiving nurse. Opportunity for questions and clarification was provided.

## 2018-01-02 NOTE — CDMP QUERY
Account Number: [de-identified]  MRN: [id number]  Patient: Isabel Tabor  Created: [id number]:90  Clinician Name: Kate Pringle RN, CCDS   Dr. Adiel Montenegro :  Please clarify if this patient is being treated/managed for:    =>  hx of CHF,  Systolic or Diastolic, Acute or Chronic   =>Other Explanation of clinical findings  =>Unable to Determine (no explanation of clinical findings)    The medical record reflects the following clinical findings, treatment, and risk factors:    Risk Factors: CHF, low EF 25-30%, Atrial flutter, CAD  Clinical Indicators: Cardiomyopathy LVEF 37% per -had stress test at [facility] , NT pro-BNP 4443. Treatment: CArdiology consult. Please clarify and document your clinical opinion in the progress notes and discharge summary including the definitive and/or presumptive diagnosis, (suspected or probable), related to the above clinical findings. Please include clinical findings supporting your diagnosis.     Thank Rica Weinstein RN, CCDS

## 2018-01-02 NOTE — PROGRESS NOTES
PICC Education: Explained reason and rationale for PICC placement along with providing education in order to make an informed consent including nature, risks, benefits, potential complications, care and maintenance of PICC line. The opportunity for questions or concerns was given. A 'Patient PICC Handbook was also provided. . Patient's MPOA, Yonis Velasquez, gave telephone consent to two RN's,  for PICC procedure to be done at the bedside. Mrs. Wili Rivera, verbalizes understanding and denies questions at this time. Patient intubated at this time. Inserted triple lumen 6 fr PICC in   Right basilic vein at 87TC with external length 0 cm out,   using using modified seldinger technique, Sherlock TLS and 3CG TPS.  (6 fr occupies 9% of the  basilic vein according to U/S measurement)   Pt's rhythm atrial flutter / . Sherlock Tip Placement device, Ultrasound guidance, Lidocaine 1% used (yes)   and amount of lidocaine used (4ml), maximum sterile barrier precautions observed. Reason for access (hemodynamically unstable, irritants/vesicant). Complications related to insertion (none). Patient tolerated procedure well with minimal blood loss. Sterile dressing applied with Biopatch, Stat loc and Tegaderm. PICC Booklet/Handout provided. right  arm circumference:32 cm. Timeout verified the correct patient and correct procedure. Brand of catheter BARD SOLO POWER PICC, Lot #[id number]/ REF# [id number] / EXP 2019-03-31. Portable CXR ordered. Primary nurse Shadi Jones, RN aware to NOT USE UNTIL PICC TIP PLACEMENT IS CONFIRMED BY CXR RESULTS and to hang new infusion tubing prior to connecting to PICC line. Assisting PICC nurse: Jodi Baeza RN / Vascular Access Nurse    Sharri Lamar RN / Vascular Access Nurse    @[phone]  \"A right PICC line is seen with the tip at the superior vena cava-right atrial junction. \"   IMPRESSION:  PICC line in satisfactory position. No pneumothorax.   Enmanuel Whitley 1/02/2018 13:05 Primary nurse Олег Almazan RN notified okay to use PICC line as portable CXR results are as stated above.   Nadine Aleman RN/Vascular Access Team

## 2018-01-02 NOTE — CARDIO/PULMONARY
C/P rehab note- chart reviewed. Adm with Acute Resp failure,A-flutter,Acute on Chronic Systolic Heart Failure,Acute Encephalopathy. LVEF 45-50% by FLORIAN. Cardioverted today. Remains intubated and sedated.

## 2018-01-02 NOTE — INTERDISCIPLINARY ROUNDS
Interdisciplinary team rounds were held 1/2/18 with the following team members:Care Management, Diabetes Treatment Specialist, Nursing, Nutrition, Palliative Care, Pharmacy, Physical Therapy, Physician, Respiratory Therapy and Clinical Coordinator. Plan of care discussed. Goal: See MD orders and progress notes for further  interventions and desired outcomes.

## 2018-01-02 NOTE — PROGRESS NOTES
Nutrition Assessment:    RECOMMENDATIONS:       DIETITIANS INTERVENTIONS/PLAN:   Monitor plan of care and readiness for TF     ASSESSMENT:   Pt admitted with acute encephalopathy. PMH: possible DM, not much info available. Chart reviewed, case discussed during CCU rounds. Pt intubated and sedated on propofol @ 19.6mL/h which provides 517 kcals daily. Levo at 15. OGT to suction with moderate OP. Pt may need [address] Family working with Palliative on goals of care. BG uncontrolled (270-308), insulin added this morning per DTC recommendations. MST triggered for unsure of wt loss or amount. Will monitor plan of care and readiness for enteral nutrition, he is currently too unstable to consider it. SUBJECTIVE/OBJECTIVE:   Pt intubated and sedated   Diet Order: NPO  % Eaten:  No data found. Pertinent Medications:cefepime, pepcid, diflucan, solucortef, insulin NPH, humalog, levaquin, flagyl, vancomycin; [email or web address]; [email or web address]@[facility]/; Drips: propofol, levo. Chemistries:  Lab Results   Component Value Date/Time    Sodium 136 01/02/2018 04:12 AM    Potassium 5.4 01/02/2018 04:12 AM    Chloride 98 01/02/2018 04:12 AM    CO2 31 01/02/2018 04:12 AM    Anion gap 7 01/02/2018 04:12 AM    Glucose 331 01/02/2018 04:12 AM    BUN 43 01/02/2018 04:12 AM    Creatinine 1.89 01/02/2018 04:12 AM    BUN/Creatinine ratio 23 01/02/2018 04:12 AM    GFR est AA 45 01/02/2018 04:12 AM    GFR est non-AA 37 01/02/2018 04:12 AM    Calcium 7.9 01/02/2018 04:12 AM    AST (SGOT) 60 01/02/2018 04:12 AM    Alk. phosphatase 94 01/02/2018 04:12 AM    Protein, total 6.0 01/02/2018 04:12 AM    Albumin 2.1 01/02/2018 04:12 AM    Globulin 3.9 01/02/2018 04:12 AM    A-G Ratio 0.5 01/02/2018 04:12 AM    ALT (SGPT) 62 01/02/2018 04:12 AM      Anthropometrics: Height: 5' 7\" (170.2 cm) Weight: 88.7 kg (195 lb 8.8 oz)  [x]bed scale(1/1)   []stated   []unknown    IBW (%IBW):   ( ) UBW (%UBW):   (  %)    BMI: Body mass index is 30.63 kg/(m^2).     This BMI is indicative of:  []Underweight   []Normal   []Overweight   [x] Obesity   [] Extreme Obesity (BMI>40)  Estimated Nutrition Needs (Based on): 2103 Kcals/day (PSU (MSJ 1666)) , 89 g (1gPro/kg) Protein  Carbohydrate: At Least 130 g/day  Fluids: 2100 mL/day    Last BM: 12/31   []Active     []Hyperactive  [x]Hypoactive       [] Absent   BS  Skin:    [x] Intact   [] Incision  [] Breakdown   [] DTI   [] Tears/Excoriation/Abrasion  [x]Edema(nonpitting-generalized) [] Other: Wt Readings from Last 30 Encounters:   01/01/18 88.7 kg (195 lb 8.8 oz)      NUTRITION DIAGNOSES:   Problem:  Inadequate protein-energy intake      Etiology: related to pt NPO 2' vent      Signs/Symptoms: as evidenced by NPO + propofol meets <25% kcal and 0% protein needs. NUTRITION INTERVENTIONS:                     GOAL:   Plan of care will be decided re: nutrition in 2-3 days.      Cultural, Sikh, or Ethnic Dietary Needs: None     LEARNING NEEDS (Diet, Food/Nutrient-Drug Interaction):    [x] None Identified   [] Identified and Education Provided/Documented   [] Identified and Pt declined/was not appropriate      [x] Interdisciplinary Care Plan Reviewed/Documented    [x] Participated in Discharge Planning:  Unable to determine    [x] Interdisciplinary Rounds     NUTRITION RISK:    [x] High              [] Moderate           []  Low  []  Minimal/Uncompromised    PT SEEN FOR:    []  MD Consult: []Calorie Count      []Diabetic Diet Education        []Diet Education     []Electrolyte Management     []General Nutrition Management and Supplements     []Management of Tube Feeding     []TPN Recommendations    [x]  RN Referral:  [x]MST score >=2     []Enteral/Parenteral Nutrition PTA     []Pregnant: Gestational DM or Multigestation   []  Low BMI  []  Re-Screen   []  LUPE Thibode[address], [address]    Pager [phone]  Weekend Pager [phone]

## 2018-01-02 NOTE — CONSULTS
Palliative Medicine Consult  Jakub: [phone] ([phone])    Patient Name: Deep Michele  YOB: 1960    Date of Initial Consult: 1/2/18  Reason for Consult: Psychosocial Distress  Requesting Provider: Josr Robb   Primary Care Physician: PROVIDER UNKNOWN     SUMMARY:   Jacques Kirk is a 61 y.o. with a past history of severe COPD, CHF, HTN, DM, systolic HF with EF 12%, aortic valve replacement, CAD, and breathing difficulties for almost one year for which he is currently waiting for biopsy results, who was admitted on 1/1/2018 from home with a diagnosis of acute respiratory failure. Current medical issues leading to Palliative Medicine involvement include: acute hypoxic, hypercarbic respiratory failure suspect acute on chronic, followed by [place] pulmonologist since 2/2017 for chronic white out of left lung,over the past month was admitted for abx, has had 2 bronchoscopies, and is scheduled for a wedge resection of his left lung.  (see Joe Mars note 1/1/18 at [phone] for more details). Admitted to CCU intubated, on levophed for hypotension, amiodarone for rate control. ECMO was considered, however, family declined, made pt DNR, not to escalate care. Psychosocial: pt lives with his girlfriend of 16 years, Julio Olmedo. Pt has a 31 y/o daughter from a previous marriage. Lilly Resendiz and pt's good friend Julieat Mims are pt's mPOAs. PALLIATIVE DIAGNOSES:   1. SOB: blood gas today PC02 71, P02 59, pH 7.25  2. Acute hypoxic hypercarbic respiratory failure: very difficult to ventilate, severe unresolved and now worsening atypical bilateral penumonia- not just white out of left lung but also  RLL and RMl involvement on post intubation CXR- asymmetric left >> right but almost pan lobar  3. Acute encephalopathy  4. afib with RVR: FLORIAN and cardioversion completed 1/2/18. Pro-BNP 4443  5. Abdominal distention, copious output  6. Shock: fluids and IV levophed  7.  Acute kidney injury: BUN/Cr 43/1.89  8. Care decisions:  Pt has an AMD on file     PLAN:   1. Met with Brianna Barnett and Dunia Donnelly; obtained history, discussed pt's current medical condition and goals of care. Per Brianna Barnett, pt has been having respiratory issues for at least 1 year. After his last hospitalization last month, unbeknownst to her, pt was supposed to come home on 02, but refused it. He told her that his not needing 02 only showed that he was doing ok. That said, he couldn't get from bed to bathroom without struggling for breath. Dunia Donnelly believes pt must have been exposed to some type of flame retardant that may have caused lung damage during his time in the [employer], during which time he worked as a . 2. Provided Palliative contact information, explained that I am here for support, encouraged them to call me as needed. 3. Initial consult note routed to primary continuity provider  4. Communicated plan of care with: Palliative IDT RN     GOALS OF CARE / TREATMENT PREFERENCES:     GOALS OF CARE:  Patient/Health Care Proxy Stated Goals: Cure  1. Treat acute illness with hopes of recovery  2. Do not escalate care. TREATMENT PREFERENCES:   Code Status: Partial Code    Advance Care Planning:  Advance Care Planning 1/1/2018   Patient's Healthcare Decision Maker is: Legal Next Anna Epps   Primary Decision Maker Name Mirian Guerra   Primary Decision Maker Phone Number [phone]   Primary Decision Maker Relationship to Patient Friend   Confirm Advance Directive Yes, on file   Does the patient have other document types Power of        Medical Interventions: Limited additional interventions   Other Instructions: Other:    As far as possible, the palliative care team has discussed with patient / health care proxy about goals of care / treatment preferences for patient.            HISTORY:     History obtained from: chart, family    CHIEF COMPLAINT: worsening SOB    HPI/SUBJECTIVE:    The patient is:   [x] Verbal and participatory  [] Non-participatory due to:     BIBA for worsening SOB over the past few days; pt was en route to the [facility] but was diverted to [facility] for immediate care because [facility] was the closest hospital.  In February 2017 to have almost compete white out of his left lung. He was lost to follow-up however, over the past month was admitted for abx, has had 2 bronchoscopies, and is scheduled for a wedge resection of his left lung. Wife reports one culture grew ecoli, and also possible fungal infection. Pt recently had a stress test which he reports was normal, however, records indicate he had an abnormal stress test without reversible defect and an EF of 37%, which was up from one last year, which was 25-30%. Records also show pt was in aflutter 4:1 on 12/29. EMS report pt using his oxygen concentrator at 6, and did not have any 02 tanks because he refused them at time of discharge. Per wife, pt is in denial, tries to portray a picture of health and does not always go to follow-ups. In ED, pt was in distress, rales, tachycardic, hypoxic, accessory muscle use, speaking in few words. CXR showed extensive airspace disease throughout the left lung. He was placed on Bipap and then shortly thereafter was intubated due to decline. Copious output from OGT.     Clinical Pain Assessment (nonverbal scale for severity on nonverbal patients):   Clinical Pain Assessment  Severity: 0          Duration: for how long has pt been experiencing pain (e.g., 2 days, 1 month, years)  Frequency: how often pain is an issue (e.g., several times per day, once every few days, constant)     FUNCTIONAL ASSESSMENT:     Palliative Performance Scale (PPS):  PPS: 10       PSYCHOSOCIAL/SPIRITUAL SCREENING:     Palliative IDT has assessed this patient for cultural preferences / practices and a referral made as appropriate to needs (Cultural Services, Patient Advocacy, Ethics, etc.)    Advance Care Planning:  Advance Care Planning 1/1/2018   Patient's Healthcare Decision Maker is: Legal Next of Andrea Epps   Primary Decision Maker Name Sameera Keys   Primary Decision Maker Phone Number [phone]   Primary Decision Maker Relationship to Patient Friend   Confirm Advance Directive Yes, on file   Does the patient have other document types Power of        Any spiritual / Orthodoxy concerns:  [] Yes /  [x] No    Caregiver Burnout:  [] Yes /  [] No /  [x] No Caregiver Present      Anticipatory grief assessment:   [x] Normal  / [] Maladaptive       ESAS Anxiety: Anxiety: 0    ESAS Depression:   unable to assess due to pt factors       REVIEW OF SYSTEMS:     Positive and pertinent negative findings in ROS are noted above in HPI. The following systems were [] reviewed / [x] unable to be reviewed as noted in HPI  Other findings are noted below. Systems: constitutional, ears/nose/mouth/throat, respiratory, gastrointestinal, genitourinary, musculoskeletal, integumentary, neurologic, psychiatric, endocrine. Positive findings noted below. Modified ESAS Completed by: provider           Pain: 0   Anxiety: 0       Dyspnea: 0                    PHYSICAL EXAM:     From RN flowsheet:  Wt Readings from Last 3 Encounters:   01/02/18 205 lb 7.5 oz (93.2 kg)     Blood pressure 127/75, pulse 92, temperature 99.4 °F (37.4 °C), resp. rate 30, height 5' 7\" (1.702 m), weight 205 lb 7.5 oz (93.2 kg), SpO2 91 %.     Pain Scale 1: Behavioral Pain Scale (BPS)  Pain Intensity 1: 3                 Last bowel movement, if known:     Constitutional: intubated  Eyes: pupils equal, anicteric  ENMT: no nasal discharge, moist mucous membranes  Cardiovascular: regular rhythm, distal pulses intact  Respiratory: breathing not labored, symmetric  Gastrointestinal: soft non-tender, +bowel sounds  Musculoskeletal: no deformity, no tenderness to palpation  Skin: warm, dry  Neurologic: unable to assess due to pt factors  Psychiatric:unable to assess due to pt factors        HISTORY: Principal Problem:    Acute respiratory failure ([facility] (1/1/2018)    Active Problems:    Acute encephalopathy (1/1/2018)      Typical atrial flutter ([facility] (1/1/2018)      Acute on chronic systolic HF (heart failure) ([facility] (1/2/2018)      Past Medical History:   Diagnosis Date    Acute on chronic systolic HF (heart failure) ([facility] 1/2/2018      No past surgical history on file. Family History   Problem Relation Age of Onset    Hypertension Other       History reviewed, no pertinent family history. Social History   Substance Use Topics    Smoking status: Not on file    Smokeless tobacco: Not on file    Alcohol use Not on file     No Known Allergies   Current Facility-Administered Medications   Medication Dose Route Frequency    sodium chloride (NS) flush 10-30 mL  10-30 mL InterCATHeter PRN    sodium chloride (NS) flush 10 mL  10 mL InterCATHeter Q24H    sodium chloride (NS) flush 10-40 mL  10-40 mL InterCATHeter Q8H    sodium chloride (NS) flush 20 mL  20 mL InterCATHeter Q24H    heparin (porcine) pf 300 Units  300 Units InterCATHeter PRN    [START ON 1/3/2018] VANCOMYCIN RANDOM LEVEL  1 Each Other ONCE    [START ON 1/3/2018] VANCOMYCIN INFORMATION NOTE   Other DAILY    [START ON 1/4/2018] levoFLOXacin (LEVAQUIN) 750 mg in D5W IVPB  750 mg IntraVENous Q48H    cefepime (MAXIPIME) 2 g in 0.9% sodium chloride (MBP/ADV) 100 mL  2 g IntraVENous Q12H    [START ON 1/3/2018] enoxaparin (LOVENOX) 90 mg  90 mg SubCUTAneous Q24H    [START ON 1/3/2018] famotidine (PF) (PEPCID) 20 mg in sodium chloride 0.9 % 10 mL injection  20 mg IntraVENous Q24H    hydrocortisone Sod Succ (PF) (SOLU-CORTEF) injection 50 mg  50 mg IntraVENous Q8H    And    insulin NPH (NOVOLIN N, HUMULIN N) injection 8 Units  8 Units SubCUTAneous Q8H    0.45% sodium chloride 1,000 mL with sodium bicarbonate (1.9%) 478 mEq, folic acid 1 mg, thiamine 100 mg, mvi, adult no. 4 with vit K 10 mL, ascorbic acid (vitamin C) 500 mg infusion IntraVENous CONTINUOUS    insulin lispro (HUMALOG) injection   SubCUTAneous Q6H    ondansetron (ZOFRAN) injection 4 mg  4 mg IntraVENous Q4H PRN    labetalol (NORMODYNE;TRANDATE) injection 10 mg  10 mg IntraVENous Q2H PRN    albuterol-ipratropium (DUO-NEB) 2.5 MG-0.5 MG/3 ML  3 mL Nebulization Q2H PRN    albuterol-ipratropium (DUO-NEB) 2.5 MG-0.5 MG/3 ML  3 mL Nebulization Q4H RT    sodium chloride (NS) flush 5-10 mL  5-10 mL IntraVENous Q8H    sodium chloride (NS) flush 5-10 mL  5-10 mL IntraVENous PRN    fluconazole (DIFLUCAN) 400mg/200 mL IVPB (premix)  400 mg IntraVENous DAILY    fentaNYL citrate (PF) injection  mcg   mcg IntraVENous Q2H PRN    LORazepam (ATIVAN) injection 1 mg  1 mg IntraVENous Q4H PRN    chlorhexidine (PERIDEX) 0.12 % mouthwash 15 mL  15 mL Oral Q12H    mupirocin calcium (BACTROBAN) 2 % cream   Topical BID    amiodarone (CORDARONE) 375 mg/250 mL D5W infusion  0.5 mg/min IntraVENous CONTINUOUS    metroNIDAZOLE (FLAGYL) IVPB premix 500 mg  500 mg IntraVENous Q12H    propofol (DIPRIVAN) infusion  0-50 mcg/kg/min IntraVENous TITRATE    NOREPINephrine (LEVOPHED) 8 mg in 5% dextrose 250mL infusion  2-30 mcg/min IntraVENous TITRATE    glucose chewable tablet 16 g  4 Tab Oral PRN    dextrose (D50W) injection syrg 12.5-25 g  12.5-25 g IntraVENous PRN    glucagon (GLUCAGEN) injection 1 mg  1 mg IntraMUSCular PRN          LAB AND IMAGING FINDINGS:     Lab Results   Component Value Date/Time    WBC 32.4 01/02/2018 04:12 AM    HGB 14.9 01/02/2018 04:12 AM    PLATELET 697 12/70/1454 04:12 AM     Lab Results   Component Value Date/Time    Sodium 136 01/02/2018 04:12 AM    Potassium 5.4 01/02/2018 04:12 AM    Chloride 98 01/02/2018 04:12 AM    CO2 31 01/02/2018 04:12 AM    BUN 43 01/02/2018 04:12 AM    Creatinine 1.89 01/02/2018 04:12 AM    Calcium 7.9 01/02/2018 04:12 AM    Magnesium 2.0 01/02/2018 04:12 AM    Phosphorus 3.7 01/02/2018 04:12 AM      Lab Results   Component Value Date/Time    AST (SGOT) 60 01/02/2018 04:12 AM    Alk. phosphatase 94 01/02/2018 04:12 AM    Protein, total 6.0 01/02/2018 04:12 AM    Albumin 2.1 01/02/2018 04:12 AM    Globulin 3.9 01/02/2018 04:12 AM     No results found for: INR, PTMR, PTP, PT1, PT2, APTT   No results found for: IRON, FE, TIBC, IBCT, PSAT, FERR   Lab Results   Component Value Date/Time    pH 7.25 01/02/2018 06:32 AM    PCO2 71 01/02/2018 06:32 AM    PO2 59 01/02/2018 06:32 AM     No components found for: [place]   Lab Results   Component Value Date/Time    CK 34 01/02/2018 04:12 AM    CK - MB 1.4 01/02/2018 04:12 AM                Total time:   Counseling / coordination time, spent as noted above:   > 50% counseling / coordination?:     Prolonged service was provided for  []30 min   []75 min in face to face time in the presence of the patient, spent as noted above. Time Start:   Time End:   Note: this can only be billed with 81501 (initial) or 87022 (follow up). If multiple start / stop times, list each separately.

## 2018-01-02 NOTE — PROGRESS NOTES
Pressure Ulcer Prevention Alert Received for Angus < 14 (moderate risk).        Care Plan/Interventions for Nursin. Complete Angus Pressure Ulcer Risk Scale and use sub scores to identify appropriate interventions. 2. Perform Assessment: skin, changes in LOC, visual cues for pain, monitor skin under medical devices  3. Respond to Reduced Sensory Perception: changes in LOC, check visual cues for pain, float heels, suspension boots, pressure redistribution bed/mattress/chair cushion, turning and reposition approximately every 2 hours (pillows & wedges), pad between skin to skin, turn & reposition  4. Manage Moisture: absorbent under pads, internal / external urinary device, internal /  external fecal device, minimize layers, contain wound drainage, access need for specialty bed, limit adult briefs, maintain skin hydration (lotion/cream), moisture barrier, offer toileting every hour  5. Promote Activity: increase time out of bed, chair cushion, PT/OT evaluation, trapeze to reposition, pressure redistribution bed/mattress/chair  6. Address Reduced Mobility: float heels / suspension boot, HOB 30 degrees or less, pressure redistribution bed/mattress/cushion, PT / OT evaluation, turn and reposition approximately every 2 hours (pillows & wedges)  7. Promote Nutrition: document food / fluid / supplement intake, encourage/assist with meals as needed  8. Reduce Friction and Shear: transferring/repositioning devices (lift/draw sheet), lift team/ patient mobility team, feet elevated on foot rest, minimize layers, foam dressing / transparent film / skin sealants, protective barrier creams and emollients, transfer aides (board, Meagan lift, ceiling lift, stand assist), HOB 30 degrees or less, trapeze to reposition.   Wound Care Team

## 2018-01-03 NOTE — PROGRESS NOTES
Interdisciplinary team rounds were held 1/3/2018 with the following team members:Care Management, Diabetes Treatment Specialist, Nursing, Nutrition, Pharmacy, Physical Therapy, Physician and Respiratory Therapy. Plan of care discussed. Goal: Adjust medications, continue to monitor and support. See MD orders and progress notes for further  interventions and desired outcomes.

## 2018-01-03 NOTE — PROGRESS NOTES
[address] [place] [address]  [phone]      Cardiology Progress Note      1/3/2018 8:30 AM    Admit Date: 1/1/2018    Admit Diagnosis:   Acute encephalopathy    Subjective:     Abigail Michele remains intubated/sedated. Successful cardioversion yesterday from aflutter to SR. Overnight developed PAF, rate controlled, continues on amiodarone. Visit Vitals    /62    Pulse (!) 102    Temp 98.2 °F (36.8 °C)    Resp 30    Ht 5' 7\" (1.702 m)    Wt 93.2 kg (205 lb 7.5 oz)    SpO2 93%    BMI 32.18 kg/m2       Current Facility-Administered Medications   Medication Dose Route Frequency    sodium chloride (NS) flush 10-30 mL  10-30 mL InterCATHeter PRN    sodium chloride (NS) flush 10-40 mL  10-40 mL InterCATHeter Q8H    sodium chloride (NS) flush 20 mL  20 mL InterCATHeter Q24H    heparin (porcine) pf 300 Units  300 Units InterCATHeter PRN    VANCOMYCIN INFORMATION NOTE   Other DAILY    [START ON 1/4/2018] levoFLOXacin (LEVAQUIN) 750 mg in D5W IVPB  750 mg IntraVENous Q48H    cefepime (MAXIPIME) 2 g in 0.9% sodium chloride (MBP/ADV) 100 mL  2 g IntraVENous Q12H    enoxaparin (LOVENOX) 90 mg  90 mg SubCUTAneous Q24H    famotidine (PF) (PEPCID) 20 mg in sodium chloride 0.9 % 10 mL injection  20 mg IntraVENous Q24H    hydrocortisone Sod Succ (PF) (SOLU-CORTEF) injection 50 mg  50 mg IntraVENous Q8H    And    insulin NPH (NOVOLIN N, HUMULIN N) injection 8 Units  8 Units SubCUTAneous Q8H    0.45% sodium chloride 1,000 mL with sodium bicarbonate (7.5%) 363 mEq, folic acid 1 mg, thiamine 100 mg, mvi, adult no. 4 with vit K 10 mL, ascorbic acid (vitamin C) 500 mg infusion   IntraVENous CONTINUOUS    insulin lispro (HUMALOG) injection   SubCUTAneous Q6H    ondansetron (ZOFRAN) injection 4 mg  4 mg IntraVENous Q4H PRN    labetalol (NORMODYNE;TRANDATE) injection 10 mg  10 mg IntraVENous Q2H PRN    albuterol-ipratropium (DUO-NEB) 2.5 MG-0.5 MG/3 ML  3 mL Nebulization Q2H PRN    albuterol-ipratropium (DUO-NEB) 2.5 MG-0.5 MG/3 ML  3 mL Nebulization Q4H RT    sodium chloride (NS) flush 5-10 mL  5-10 mL IntraVENous PRN    fluconazole (DIFLUCAN) 400mg/200 mL IVPB (premix)  400 mg IntraVENous DAILY    fentaNYL citrate (PF) injection  mcg   mcg IntraVENous Q2H PRN    chlorhexidine (PERIDEX) 0.12 % mouthwash 15 mL  15 mL Oral Q12H    mupirocin calcium (BACTROBAN) 2 % cream   Topical BID    amiodarone (CORDARONE) 375 mg/250 mL D5W infusion  1 mg/min IntraVENous CONTINUOUS    metroNIDAZOLE (FLAGYL) IVPB premix 500 mg  500 mg IntraVENous Q12H    propofol (DIPRIVAN) infusion  0-50 mcg/kg/min IntraVENous TITRATE    NOREPINephrine (LEVOPHED) 8 mg in 5% dextrose 250mL infusion  2-30 mcg/min IntraVENous TITRATE    glucose chewable tablet 16 g  4 Tab Oral PRN    dextrose (D50W) injection syrg 12.5-25 g  12.5-25 g IntraVENous PRN    glucagon (GLUCAGEN) injection 1 mg  1 mg IntraMUSCular PRN       Objective:      Physical Exam:  General Appearance:  WNWD  male intubated  Chest:   Coarse rhonchi  Cardiovascular:  irr,. irr  no murmur.   Abdomen:   Soft, non-tender, bowel sounds are active.   Extremities: no peripheral edema  Skin:  Warm and dry.     Data Review:   Recent Labs      01/03/18   0401  01/02/18   0412  01/01/18   0025   WBC  19.1*  32.4*  32.3*   HGB  13.3  14.9  18.4*   HCT  43.0  48.6  55.0*   PLT  83*  134*  239     Recent Labs      01/03/18   0401  01/02/18   0412  01/01/18   0025   NA  136  136  138   K  4.7  5.4*  4.6   CL  98  98  94*   CO2  35*  31  42*   GLU  269*  331*  83   BUN  43*  43*  22*   CREA  1.42*  1.89*  1.07   CA  8.4*  7.9*  8.8   MG  2.4  2.0   --    PHOS  3.1  3.7   --    ALB  1.8*  2.1*  3.2*   TBILI  0.4  0.4  0.4   SGOT  50*  60*  35   ALT  48  62  40       Recent Labs      01/02/18   0412  01/01/18   0516   TROIQ  0.10*  0.05*   CPK  34*   --    CKMB  1.4   --          Intake/Output Summary (Last 24 hours) at 01/03/18 1037  Last data filed at 01/03/18 1000   Gross per 24 hour   Intake          4198.56 ml   Output             1223 ml   Net          2975.56 ml        Telemetry: PAF, SR with PACs  EKG: pending    Assessment:     Principal Problem:    Acute respiratory failure ([address] (1/1/2018)    Active Problems:    Acute encephalopathy (1/1/2018)      Typical atrial flutter ([address] (1/1/2018)      Acute on chronic systolic HF (heart failure) ([address] (1/2/2018)      Acute renal insufficiency (1/2/2018)      SOB (shortness of breath) (1/2/2018)      Abdominal distention (1/2/2018)        Plan:   Santosh Cabral with RVR:  Successful cardioversion, but now with PAF at 100-110bpm.  Likely due to his underlying pulmonary issues  Continue on IV amio, rebolus if rate increases  Continue on Lovenox BID. CHADS2 vasc Score: 2. Remains on Levo low dose, difficult to wean   Once BP stable, can consider BB and or CaCH for rate management. Acute on chronic systolic HF:  Echo shows EF 45-50%  +7L with fluid resuscitation, weight up 10# since admission. Recommend decreasing volume  Request for [facility] records pending  According to significant other, patient not compliant with medications, diet, f/u appointments  Monitor I/Os, daily weights, labs        [address] Cardiology    1/3/2018         Agree with note as outlined by  NP. I confirm findings in history and physical exam. No additional findings noted. Agree with plan as outlined above.      Adalberto Cedillo MD

## 2018-01-03 NOTE — PROGRESS NOTES
Pharmacy Automatic Renal Dosing Protocol - Antimicrobials    Indication for Antimicrobials: Recurrent HAP    Current Regimen of Each Antimicrobial:  Cefepime 2 gm IV every 12 hours (Started 18; Day #3)  Fluconazole 400 mg IV every 24 hours (Started 18; Day #3)  Levofloxacin 750 mg IV every 48 hours (Started 18; Day #3)  Metronidazole 500 mg IV every 12 hours (Started 18; Day #3)  Vancomycin dosed by pharmacy (Started 18; Day #3)    Vancomycin Trough: 15-20 mcg/mL    Measured / Extrapolated Vancomycin Level:   Vancomycin RANDOM Level (1/3/18 at 0401) = 7.5 mcg/mL    Significant Cultures:   18 Respiratory culture = Rare yeast (FINAL)  18 Blood culture = No growth x 2 days (Results pending)    Labs:  Recent Labs      18   0412  18   0025   CREA  1.89*  1.07   BUN  43*  22*   WBC  32.4*  32.3*     Temp (24hrs), Av.7 °F (37.6 °C), Min:98.9 °F (37.2 °C), Max:100.5 °F (38.1 °C)    Creatinine Clearance (mL/min) or Dialysis: 53-60 mL/min    No results found for: PCT    Impression/Plan:   - Patient's DEISI improved over the past 24 hours. - Cefepime dosed appropriately based on indication and renal function. Continue current regimen. - Fluconazole dosed appropriately based on indication and renal function. Continue current regimen.  - Levofloxacin dose adjusted to 750 mg IV every 24 hours based on renal function and indication.  - Metronidazole dosed appropriately based on indication and renal function. Continue current regimen. - Based on renal function improvement and random level assessment, vancomycin dose adjusted to 1500 mg IV every 18 hours. - MD did not want to de-escalate antibiotic therapy today. Follow-up on 18 regarding antimicrobial de-escalation. Pharmacy will follow daily and adjust medications as appropriate for renal function and/or serum levels.     Thank you,  Ken Jon, RIAND

## 2018-01-03 NOTE — PROGRESS NOTES
Luz.Khari- MD paged, patient in a fib.  0- Spoke to Dr. Lucas Solorzano, updated on patient, MD aware of A-fib. No new orders at this time. 0016- Dr. Tobin Sanchez informed of critical Lactic acid, order received for 500 mL bolus. Athénaïs.Mask- MD paged regarding critical ABG's.   0558- SAT failed due to agitation.    8695- Dr. Mary Barajas updated on patient, informed of critical ABG's, order received to increase PEEP to 10.   0700- Report to [facility]

## 2018-01-03 NOTE — DIABETES MGMT
DTC Progress Note    Recommendations/ Comments: Pt discussed with rounding team and Dr. Pawel Holguin- plan to increase NPH to 15 units to be timed with steroids    Chart reviewed on Haroon Michele during Multidisciplinary Rounds. A1c:   Lab Results   Component Value Date/Time    Hemoglobin A1c 9.0 01/02/2018 04:12 AM           Recent Glucose Results:   Lab Results   Component Value Date/Time     (H) 01/03/2018 04:01 AM    GLUCPOC 294 (H) 01/03/2018 05:36 AM    GLUCPOC 285 (H) 01/02/2018 11:18 PM    GLUCPOC 278 (H) 01/02/2018 09:33 PM        Lab Results   Component Value Date/Time    Creatinine 1.42 01/03/2018 04:01 AM     Estimated Creatinine Clearance: 61.7 mL/min (based on Cr of 1.42). Active Orders   Diet    DIET NPO        PO intake: No data found. Will continue to follow as needed. Thank you.         Etienne Woodall, [address] [place] [address], [address]  Office:  [phone]

## 2018-01-03 NOTE — PROGRESS NOTES
Hospitalist Progress Note  Martínez Morelos MD  Internal medicine/ Hospitalist    Daily Progress Note: 1/3/2018 5:45 PM      Interval history / Subjective:   Chriss Machuca is a 61 y.o.  male with known history as listed below presents to ED with complaint noted above. Patient with unclear PMH given no records in our system. Per review of ED records and discussion with ED MD given patient is unconscious and likely near intubation and no family/loved ones in room. Records from [place] being reviewed and in brief he is following pulmonary for his \"chronic\" white out of left lung since 2/2017. He has undergone multiple bronchoscopies and antibiotics for this without improvement  -I do not have results of these tests at this time. Girlfriend mentioned. Patient sedated and intubated. Current Facility-Administered Medications   Medication Dose Route Frequency    enoxaparin (LOVENOX) 90 mg  90 mg SubCUTAneous Q12H    insulin NPH (NOVOLIN N, HUMULIN N) injection 15 Units  15 Units SubCUTAneous Q8H    And    hydrocortisone Sod Succ (PF) (SOLU-CORTEF) injection 50 mg  50 mg IntraVENous Q8H    famotidine (PF) (PEPCID) 20 mg in sodium chloride 0.9 % 10 mL injection  20 mg IntraVENous Q12H    vancomycin (VANCOCIN) 1500 mg in  ml infusion  1,500 mg IntraVENous Q18H    levoFLOXacin (LEVAQUIN) 750 mg in D5W IVPB  750 mg IntraVENous Q24H    amiodarone (CORDARONE) 900 mg/250 ml D5W infusion  1 mg/min IntraVENous TITRATE    sodium chloride (NS) flush 10-30 mL  10-30 mL InterCATHeter PRN    sodium chloride (NS) flush 10-40 mL  10-40 mL InterCATHeter Q8H    sodium chloride (NS) flush 20 mL  20 mL InterCATHeter Q24H    heparin (porcine) pf 300 Units  300 Units InterCATHeter PRN    cefepime (MAXIPIME) 2 g in 0.9% sodium chloride (MBP/ADV) 100 mL  2 g IntraVENous Q12H    0.45% sodium chloride 1,000 mL with sodium bicarbonate (1.9%) 742 mEq, folic acid 1 mg, thiamine 100 mg, mvi, adult no. 4 with vit K 10 mL, ascorbic acid (vitamin C) 500 mg infusion   IntraVENous CONTINUOUS    insulin lispro (HUMALOG) injection   SubCUTAneous Q6H    ondansetron (ZOFRAN) injection 4 mg  4 mg IntraVENous Q4H PRN    labetalol (NORMODYNE;TRANDATE) injection 10 mg  10 mg IntraVENous Q2H PRN    albuterol-ipratropium (DUO-NEB) 2.5 MG-0.5 MG/3 ML  3 mL Nebulization Q2H PRN    albuterol-ipratropium (DUO-NEB) 2.5 MG-0.5 MG/3 ML  3 mL Nebulization Q4H RT    sodium chloride (NS) flush 5-10 mL  5-10 mL IntraVENous PRN    fluconazole (DIFLUCAN) 400mg/200 mL IVPB (premix)  400 mg IntraVENous DAILY    fentaNYL citrate (PF) injection  mcg   mcg IntraVENous Q2H PRN    chlorhexidine (PERIDEX) 0.12 % mouthwash 15 mL  15 mL Oral Q12H    mupirocin calcium (BACTROBAN) 2 % cream   Topical BID    metroNIDAZOLE (FLAGYL) IVPB premix 500 mg  500 mg IntraVENous Q12H    propofol (DIPRIVAN) infusion  0-50 mcg/kg/min IntraVENous TITRATE    NOREPINephrine (LEVOPHED) 8 mg in 5% dextrose 250mL infusion  2-30 mcg/min IntraVENous TITRATE    glucose chewable tablet 16 g  4 Tab Oral PRN    dextrose (D50W) injection syrg 12.5-25 g  12.5-25 g IntraVENous PRN    glucagon (GLUCAGEN) injection 1 mg  1 mg IntraMUSCular PRN        Objective:     Visit Vitals    /67    Pulse (!) 115    Temp 97.7 °F (36.5 °C)    Resp 30    Ht 5' 7\" (1.702 m)    Wt 97.9 kg (215 lb 13.3 oz)    SpO2 96%    BMI 33.8 kg/m2    O2 Flow Rate (L/min): 15 l/min O2 Device: Endotracheal tube, Ventilator    Temp (24hrs), Av.6 °F (37 °C), Min:97.7 °F (36.5 °C), Max:99.3 °F (37.4 °C)      701 -  1900  In: 2375.3 [I.V.:2375.3]  Out: 550 [Urine:500]  1901 -  0700  In: 7593.5 [I.V.:7533.5]  Out: 8319 [[facility]:]   P/E  Intubated and sedated. Heent:perrla,at/nc,mouth dry,ET tube present. Neck:supple,no jvd  Lungs:rales both lungs  Heart: tach,s1s2 nl,no m/g  Abdm:soft,not tender,bs present.   Extr:no edema,good pedis pulses. Neuro:intubated,sedated,unresponsive.     Data Review    Recent Results (from the past 12 hour(s))   BLOOD GAS, ARTERIAL    Collection Time: 01/03/18  5:33 AM   Result Value Ref Range    pH 7.34 (L) 7.35 - 7.45      PCO2 63 (H) 35.0 - 45.0 mmHg    PO2 49 (LL) 80 - 100 mmHg    O2 SAT 81 (L) 92 - 97 %    BICARBONATE 34 (H) 22 - 26 mmol/L    BASE EXCESS 5.5 mmol/L    O2 METHOD VENTILATOR      FIO2 100 %    MODE PRESSURE CONTROL      SET RATE 30      IPAP/PIP 26      EPAP/CPAP/PEEP 8.0      Sample source ARTERIAL      SITE LEFT BRACHIAL      SYBIL'S TEST N/A      Critical value read back NOR[facility] TALBERT RN    GLUCOSE, POC    Collection Time: 01/03/18  5:36 AM   Result Value Ref Range    Glucose (POC) 294 (H) 65 - 100 mg/dL    Performed by Parker Powell    GLUCOSE, POC    Collection Time: 01/03/18 11:38 AM   Result Value Ref Range    Glucose (POC) 238 (H) 65 - 100 mg/dL    Performed by Bernard Prado          Assessment/Plan:     Principal Problem:    Acute respiratory failure ([address] (1/1/2018)    Active Problems:    Acute encephalopathy (1/1/2018)      Typical atrial flutter ([address] (1/1/2018)      Acute on chronic systolic HF (heart failure) (HCC) (1/2/2018)      Acute renal insufficiency (1/2/2018)      SOB (shortness of breath) (1/2/2018)      Abdominal distention (1/2/2018)      PNA    Afib  Care Plan   acute hypoxic, hypercarbic respiratory failure suspect acute on chronic causing Acute encephalopathy  Atrial fibrillation with rapid response  Sepsis due to Possible bilateral pneumonia  Hypotension post intubation  Chronic steroid use, possible adrenal insufficiency  Chronic left lung white out of unclear origin  PNA  -steroids to cover both possible adrenal insufficiency and respiratory exacerbation  -nebs prn and scheduled  -remains intubated with vent management per pulmonary  -unclear etiolgoy of chronic left lung white out - may need to discuss with pulmonary at [facility] for further insight and details as to what their next steps were to be and see if we can help further  -not stable for transfer at this time to [facility] which were his wishes  -will empirically treat for HAP given recent [facility] IP stay - cefepime, lq, vanco.   -will continue on diflucan as well given chronic steroids and lung condition  -tailor abx regimen based on culture results  -abg as needed  -lactic acid wnl  -CXR on 1/2/2018:PICC line in satisfactory position. No pneumothorax  -CXR 1/3 c/w dense airspace disease.  -Had cardioversion today,now in suns and on amiodarone drip  -ECHO c/w EF 45 % to 50 %. There were no regional wall motion abnormalities.  -On levophed  -S/p cardioversion by cardiology. Now in sinus. On amiodarone  -Will continue to follow cardiology and pulm recommendations.     Abdominal distension  -post NGT placement with copious output  -KUB on 1/1/2018 c/w Gastric dilatation. Bilateral airspace disease visualized lung bases. -etiology as to why distension occurred unclear - will follow KUB and discuss with intensivist     Suspect coronary artery disease  -had stress test at [facility] results pending -      Possible DM2  -ssi + poc bs for now  -may need coverage scheduled while on steroids     We do not have medications on record and therefore suspect we will need to augment once records become available.     Patient remains acutely/critically ill with elevated risk for furhter decompensation.  To ICU.      I have personally reviewed the radiographs, laboratory data in Epic and decisions and statements above are based partially on this personal interpretation.     Code Status: Full Code  DVT Prophylaxis: Hep SQ

## 2018-01-03 NOTE — PROGRESS NOTES
0700 Report received from [facility].    4337 Assessment completed. Patient intubated on ventilator for airway support. Patient withdrawals in all extremities to painful stimuli. Patient shows no signs of agitation or restlessness at present. Propofol IV infusing for sedation and being titrated to keep a RASS of 0 to -1. Amiodarone infusing at 0.5mg/min and Levophed IV infusing and being titrated to keep SBP>90. Granados catheter intact draining veronika colored urine. OG tube intact, placement verified. Bilateral wrist restraints intact for patient safety. Mouth care done. Patient currently in AFIB on the monitor. No signs of acute distress noted. 0800 Dr. Samanta Kemp in to see and assess patient. Per Dr. Samanta Kemp, Lactic Acid lab work does not need to be repeated, Lactic Acid 2.5 on AM labs. 9400 No Name Florin, NP in to see and assess patient. EKG ordered due to AFIB.     0820 EKG done showing AFIB, Whitney Gloria aware of results. No new orders at this time. 7516 Dr. Valerie Hogan in to see and assess patient, new order received to increase Amiodarone to 1mg/min. 1200 No change from previous assessment. Will closely monitor. 1 Dr. Samanta Kemp in room to talk with family about patient's condition. 1610 Reassessment completed. Changes documented in flow sheet. Family at bedside. No other issues noted at present. 1900 Report given to NIMISHA Guadarrama.

## 2018-01-03 NOTE — PROGRESS NOTES
[facility] INTENSIVIST Consult Service Note  Pulmonary, Critical Care, and Sleep Medicine    Name: Deja Pérez MRN: [id number]   : 1960 Hospital: [facility]   Date: 1/3/2018   Hospital Day: 3       Subjective/Interval History:   I have reviewed the notes from other providers and old records readily available and summarized findings below with the flowsheet. Seen earlier today on rounds. Patient Active Problem List   Diagnosis Code    Acute encephalopathy G93.40    Acute respiratory failure (HCC) J96.00    Typical atrial flutter (HCC) I48.3    Acute on chronic systolic HF (heart failure) (HCC) I50.23    Acute renal insufficiency N28.9    SOB (shortness of breath) R06.02    Abdominal distention R14.0     18 3:25 PM  Poor gas exchange. Essentially single ( or less) lung ventilation with mostly dead space. Conventional support barely adequate. Updated wife and best friend elma who share mPOA responsibilities. I reached out to , [facility] to discuss management options. Appreciate his time and quidance. ECMO was considered for salvage and support but given pt's long xiong with an unknown left lobar infiltrative process, likelihood of full recovery unlikley. Family was clear when I conveyed the options that pt would not consider portable oxygen or other dependency any quality of life. Decision made to continue with conventional and conservative measures. They agree to make pt a DNR per his wishes and that would include no additional pressors( already on levophed, no shock, no CPR. Will see how he does overnight but is tenuous. Will start low dose IV bicarb and allow permissive hypercapnea. 18 HR still > 150 despite med management. ABG reviewed and d/w RT, nursing. IV bicarb. Cr up. Needs PICC. Unstable but gas exchange marginal as expected.  Family still knows pt is critical but staying the course for know and trying to treat what might be treatable to buy a little more time for pt and meds. 1/3/18 on vent. More PEEP 10 100%; some UOP. DEISI about same. IV levophed 3      IMPRESSION:     1. Acute and possible chronic respiratory failure with severe hypoxia and severe hypercarbia- noting his erytrhocytosis could be from long standing hypoxia, was very difficult to ventilate-but now on higher PEEP and oxygenation more of an issue- I think he is developing some pulmonary edema in his right lunga s well as an effusion but drainage too risky; cannot afford to have pneumothorax on his only \"good\" side  2. Severe unresolved and now worsening atypical bilateral penumonia- not just white out of left lung but also RLL and RMl involvement on post intubation CXR- asymmetric left >> right but almost pan lobar- will likely worsen; showed CXRs to wife at bedside; for now sparing RUL but for how long? Had bronch at the [address] and only E coli treated? 3. DEISI from sepsis and shock- likely ATN- nonoliguric  4. Atrail filbrillation, flutter with RVR despite IV amio- [facility] after FLORIAN by cardiology- discussed with pts' Rush Donnelly who is agreeable to trying  5. Shock still dependent on IV levophed- hoping [facility] will help-   6. Poor IV access- [facility] agrees  7. Cardiomyopathy LVEF 37% per -had stress test at [facility]   8. possible DM2  9. possisble Rheumatoid arthritis per wife and only just recently given prednisone  10. Medical non complinance- unclear if he ever really took the meds sent to him, wife noted he refused some and declined home O2  11. Pt is requiring Drug therapy requiring intensive monitoring for toxicity  12. Pt is critically ill and at high risk of end organ dysfunction and failure      RECOMMENDATIONS/PLAN:   1. CCU  2. Hold on ARDSNET protocol but will adjust accordingly to avoid volutrauma, barotrauma  3. As soon as off pressors, will give diuretics  4. Sedate but daily SAT  5. Need more than micronutrrents soon  6.  PICC - he would not want dialysis if he worsens based on my discussio with family- needs secure access  7. CXR in AM  8. Would not be safe to bronch  9. Bronchial hygiene  10. Monitor UOP  11. pallaitive care consult to support family only  15. May need to paralyze if too much dyssynchrony  13. IV levophed- try to wean as long as SBP > 90  14. IV fluids  15. IV abx will be adjusted per reanl fnction  16. Follow sputum for cultures  17. Pt needs IV fluids with additives and Drug therapy requiring intensive monitoring for toxicity  18. Prescription drug management with home med reconciliation done  19. DVT, SUP prophylaxis  20. Patient requiring restraints due to threat of injury to self, interference with medical devices. 21. Will be available to assist in medical management while in the CCU pending disposition          My assessment/management was discussed with:  Nursing XX    respiratory therapy XX  XX   Family XX wife and mPOA Viry Emanuel      I have provided   35    minutes of critical care time rendering care exclusive of any procedures. During this entire length of time I was immediately available to the patient.      The reason for providing this level of medical care was due to a critical illness that impaired one or more vital organ systems, such that there was a high probability of imminent or life threatening deterioration in the patient's condition. Pt's condition is unstable and unpredictable. This care involved high complexity decision making which includes independently reviewing the patient's past medical records, current laboratory results, medication profiles that were immediately available to me and actual Xray images at the bedside in order to assess, support vital system function, and to treat this degree of vital organ system failure, and to prevent further life threatening deterioration of the patients condition. I was in direct communication with the nursing staff throughout this time.     I have personally and independently reviewed the patients interval diagnostic lab data, radiographs and the reports. I have ordered additional labs to follow the current medical conditions and to monitor treatment responses over the next 24 hours or sooner if needed. I will order additional imaging to follow longitudinal changes found on the most current imaging. Risk of deterioration: high   [x] High complexity decision making was performed  [x] See my orders for details  Tubes:   Granados and Intubated/Vent  ICU disposition :Unchanged. Updated Family. Time 1:20 PM      Code: Partial Code        Subjective/Initial History:   I have reviewed the flowsheet and previous days notes. Seen earlier today on rounds. I was asked by Pino Aguilar MD to see Inis Handler in consultation for a chief complaint of severe bilateral pneumonia    Paramjit Michele Major, 61 y.o. male retired  per wife followed at the [place], with PMHx significant for COPD, CHF, HTN, DM, and breathing difficulties for almost one year presents via EMS to the ED with cc of worsening SOB over the past few days. Per EMS, pt was en route to the [place] but was taken here for immediate care because this was the closest hospital. Per medical records, pt has been recently evaluated several times this past month at the [place] for worsening SOB. He was found in February 2017 to have almost complete white out of his left lung. He was lost to follow-up but over past month has been admitted for antibiotics, had two bronchoscopies, and is scheduled for a wedge resection of his left lung to determine etiology of the above. One culture grew E Coli and there was some question by his wife about possible fungal infection (but this was not verified on OSH records). Pt also had a stress test Friday. Per patient it was normal. However, records indicate he had an abnormal stress test without reversible defect and an EF of 37% which was up from last noted of 25-30% one year ago.  Per records, he was also in [address] 4:1 on 12/29 as well. Per EMS pt was using his oxygen concentrator at 6 but did not have any oxygen tanks. He did not want them at time of discharge per wife. Patient tries to portray a picture of health and does not always see through his follow-up. She states the oxygen is a good example \"where he just didn't want to admit he needed it. \" she notes others have noted him not looking well thepast three months. He was discharged fr the [place] in December and was offered home o2 which he declined. Last week he was well enough to run errands. Last night he had some emesis but non bloody. Pt was coughing up copious purulent sputum. Became hypotensive in the ER. Was hypoxic and severely hypercapneic and intubated in the ER. Discussed with dr. Kanu Castelan and CCU nurising . Pt also being seen by Cardiology for atrail flutter. arrrived getting IV levophed and IV fluid bolus. Very restless, agitated pulling at tubing and resisting staff. Before intubation the pt denied fevers, CP, leg swelling, coughing or known sick contacts. He is on Prednisone at this time. PMH:  has a past medical history of Acute on chronic systolic HF (heart failure) ([facility] [address] (1/2/2018). PSH:   has no past surgical history on file. FHX: family history includes Hypertension in an other family member. SHX:    The patient is unable to give any meaningful history or review of systems due to patient factors. ROS:Review of systems not obtained due to patient factors.     No Known Allergies     MAR reviewed and pertinent medications noted or modified as needed  MEDS:   Current Facility-Administered Medications   Medication    enoxaparin (LOVENOX) 90 mg    insulin NPH (NOVOLIN N, HUMULIN N) injection 15 Units    And    hydrocortisone Sod Succ (PF) (SOLU-CORTEF) injection 50 mg    famotidine (PF) (PEPCID) 20 mg in sodium chloride 0.9 % 10 mL injection    vancomycin (VANCOCIN) 1500 mg in  ml infusion    levoFLOXacin (LEVAQUIN) 750 mg in D5W IVPB    sodium chloride (NS) flush 10-30 mL    sodium chloride (NS) flush 10-40 mL    sodium chloride (NS) flush 20 mL    heparin (porcine) pf 300 Units    cefepime (MAXIPIME) 2 g in 0.9% sodium chloride (MBP/ADV) 100 mL    0.45% sodium chloride 1,000 mL with sodium bicarbonate (5.6%) 198 mEq, folic acid 1 mg, thiamine 100 mg, mvi, adult no. 4 with vit K 10 mL, ascorbic acid (vitamin C) 500 mg infusion    insulin lispro (HUMALOG) injection    ondansetron (ZOFRAN) injection 4 mg    labetalol (NORMODYNE;TRANDATE) injection 10 mg    albuterol-ipratropium (DUO-NEB) 2.5 MG-0.5 MG/3 ML    albuterol-ipratropium (DUO-NEB) 2.5 MG-0.5 MG/3 ML    sodium chloride (NS) flush 5-10 mL    fluconazole (DIFLUCAN) 400mg/200 mL IVPB (premix)    fentaNYL citrate (PF) injection  mcg    chlorhexidine (PERIDEX) 0.12 % mouthwash 15 mL    mupirocin calcium (BACTROBAN) 2 % cream    amiodarone (CORDARONE) 375 mg/250 mL D5W infusion    metroNIDAZOLE (FLAGYL) IVPB premix 500 mg    propofol (DIPRIVAN) infusion    NOREPINephrine (LEVOPHED) 8 mg in 5% dextrose 250mL infusion    glucose chewable tablet 16 g    dextrose (D50W) injection syrg 12.5-25 g    glucagon (GLUCAGEN) injection 1 mg        Objective:     Vital Signs: Intake/Output: Intake/Output:   Temp (24hrs), Av.6 °F (37 °C), Min:97.7 °F (36.5 °C), Max:99.3 °F (37.4 °C)    Visit Vitals    /52    Pulse (!) 107    Temp 98.2 °F (36.8 °C)    Resp 30    Ht 5' 7\" (1.702 m)    Wt 93.2 kg (205 lb 7.5 oz)    SpO2 92%    BMI 32.18 kg/m2          Telemetry:    atrial fluttter O2 Device: Endotracheal tube  O2 Flow Rate (L/min): 15 l/min    Wt Readings from Last 4 Encounters:   18 93.2 kg (205 lb 7.5 oz)          Intake/Output Summary (Last 24 hours) at 18 1305  Last data filed at 18 1300   Gross per 24 hour   Intake          4215.06 ml   Output             1233 ml   Net          2982.06 ml     Last shift:       0701 - 01/03 1900  In: 1325 [I.V.:1325]  Out: 350 [Urine:300]  Last 3 shifts: 01/01 1901 - 01/03 0700  In: 7593.5 [I.V.:7533.5]  Out: 2280 [XQDCW:5293]     Hemodynamics:    CO:    CI:    CVP:    SVR:   PAP Systolic:    PAP Diastolic:    PVR:    DR76:        Ventilator Settings:      Mode Rate TV Press PEEP FiO2 PIP Min. Vent   Pressure control    250 ml    10 cm H20 100 %  38 cm H2O  11.1 l/min      Physical Exam:     General: severely ill WM on vent   HEAD: Normocephalic, without obvious abnormality, atraumatic   EYES: conjunctivae clear. PERRL,  AN Icteric sclerae   NOSE:  nares normal, no drainage, no flaring,    THROAT: intubated; Lips, mucosa dry; ? Oral hygiene;     Neck: Supple, symmetrical, trachea midline,  No accessory mm use; No Stridor/ cuff leak, No goiter or thyroid tenderness   LYMPH: No abnormally enlarged lymph nodes. in neck or groin   Chest: increased AP diameter   Lungs: rales bilaterally and rhonchi   Heart: Regular rate and rhythm  No edema   Abdomen: soft, non-tender, without masses or organomegaly, protuberant   : normal;     Granados   Extremity: negative, clubbing   Neuro: obtunded; sedated; withdraws to pain; unable to check gait and station   Psych:  Unable to assess; 2-3+ agitation   Skin: Warmno lesions noted and no edema;    Pulses:Bilateral, Radial, 1+   Capillary refill: abnormal:  sluggish capillary refill,      Data:   Interval lab and diagnostic data was reviewed. Interval radiology images were independently viewed and available reports were reviewed. All lab results for the last 24 hours reviewed.           Labs:    Recent Labs      01/03/18   0401 01/02/18 0412  01/01/18   0025   WBC  19.1*  32.4*  32.3*   HGB  13.3  14.9  18.4*   PLT  83*  134*  239     Recent Labs      01/03/18   0401 01/02/18   0412  01/01/18   0025   NA  136  136  138   K  4.7  5.4*  4.6   CL  98  98  94*   CO2  35*  31  42*   GLU  269*  331*  83   BUN  43*  43*  22*   CREA  1.42*  1.89*  1.07   CA 8. 4*  7.9*  8.8   MG  2.4  2.0   --    PHOS  3.1  3.7   --    LAC  2.5*   --   0.8   ALB  1.8*  2.1*  3.2*   SGOT  50*  60*  35   ALT  48  62  40     Recent Labs      01/03/18   0533  01/02/18   0632  01/01/18   1733   PH  7.34*  7.25*  7.23*   PCO2  63*  71*  79*   PO2  49*  59*  71*   HCO3  34*  31*  32*   FIO2  100  100  100     Recent Labs      01/02/18   0412  01/01/18   0516   CPK  34*   --    CKNDX  4.1*   --    TROIQ  0.10*  0.05*     No results found for: BNPP, BNP   Lab Results   Component Value Date/Time    Culture result: RARE NORMAL RESPIRATORY PRESTON 01/01/2018 09:48 AM    Culture result: RARE YEAST 01/01/2018 09:48 AM    Culture result: NO GROWTH 2 DAYS 01/01/2018 12:25 AM     Lab Results   Component Value Date/Time    CK 34 01/02/2018 04:12 AM       No results found for: COLOR, APPRN, SPGRU, ANGELO, PROTU, GLUCU, KETU, BILU, BLDU, UROU, AMBROSE, LEUKU, WBCU, RBCU, UEPI, BACTU, CASTS, UCRY    No results found for: IRON, FE, TIBC, IBCT, PSAT, FERR  No results found for: SR, CRP, HEAVENLY, ANAIGG, RA, RPR, RPRT, VDRLT, VDRLS, TSH, TSHEXT, TSHEXT   No results found for: TOXA1, RPR, HBCM, HBSAG, HAAB, HCAB1, HAAT, G6PD, CRYAC, HIVGT, HIVR, HIV1, HIV12, HIVPC, HIVRPI      Imaging:  I have personally reviewed the patients radiographs and have reviewed the reports:          Results from Hospital Encounter encounter on 01/01/18   XR CHEST PORT   Narrative INDICATION:  Respiratory distress. Intubated. EXAM: CXR Portable. FINDINGS: Portable chest shows support lines/devices appear unchanged since  yesterday. There is no apparent pneumothorax. Lungs show persistent dense left  consolidation and right base consolidation. Heart size is obscured. There is  difficulty excluding a component of interstitial pulmonary edema. Impression IMPRESSION: Dense airspace disease. No significant change. No results found for this or any previous visit.          Thank you for allowing us to participate in the care of this patient. We will be happy to follow along with you.     Vesna Lui MD

## 2018-01-04 NOTE — PROGRESS NOTES
Cardiology Progress Note      1/4/2018 9:34 AM    Admit Date: 1/1/2018    Admit Diagnosis: Acute encephalopathy      Subjective:     Des QUEEN Major remains intubated. Tachycardic. WBC better. Visit Vitals    /64 (BP 1 Location: Left arm, BP Patient Position: At rest)    Pulse (!) 114    Temp 99.4 °F (37.4 °C)    Resp 30    Ht 5' 7\" (1.702 m)    Wt 215 lb 13.3 oz (97.9 kg)    SpO2 91%    BMI 33.8 kg/m2       Current Facility-Administered Medications   Medication Dose Route Frequency    furosemide (LASIX) injection 40 mg  40 mg IntraVENous ONCE    metoprolol (LOPRESSOR) injection 1.25 mg  1.25 mg IntraVENous Q6H    enoxaparin (LOVENOX) 90 mg  90 mg SubCUTAneous Q12H    insulin NPH (NOVOLIN N, HUMULIN N) injection 15 Units  15 Units SubCUTAneous Q8H    And    hydrocortisone Sod Succ (PF) (SOLU-CORTEF) injection 50 mg  50 mg IntraVENous Q8H    famotidine (PF) (PEPCID) 20 mg in sodium chloride 0.9 % 10 mL injection  20 mg IntraVENous Q12H    vancomycin (VANCOCIN) 1500 mg in  ml infusion  1,500 mg IntraVENous Q18H    levoFLOXacin (LEVAQUIN) 750 mg in D5W IVPB  750 mg IntraVENous Q24H    amiodarone (CORDARONE) 900 mg/250 ml D5W infusion  1 mg/min IntraVENous TITRATE    sodium chloride (NS) flush 10-30 mL  10-30 mL InterCATHeter PRN    sodium chloride (NS) flush 10-40 mL  10-40 mL InterCATHeter Q8H    sodium chloride (NS) flush 20 mL  20 mL InterCATHeter Q24H    heparin (porcine) pf 300 Units  300 Units InterCATHeter PRN    cefepime (MAXIPIME) 2 g in 0.9% sodium chloride (MBP/ADV) 100 mL  2 g IntraVENous Q12H    0.45% sodium chloride 1,000 mL with sodium bicarbonate (1.5%) 767 mEq, folic acid 1 mg, thiamine 100 mg, mvi, adult no. 4 with vit K 10 mL, ascorbic acid (vitamin C) 500 mg infusion   IntraVENous CONTINUOUS    insulin lispro (HUMALOG) injection   SubCUTAneous Q6H    ondansetron (ZOFRAN) injection 4 mg  4 mg IntraVENous Q4H PRN    labetalol (NORMODYNE;TRANDATE) injection 10 mg  10 mg IntraVENous Q2H PRN    albuterol-ipratropium (DUO-NEB) 2.5 MG-0.5 MG/3 ML  3 mL Nebulization Q2H PRN    albuterol-ipratropium (DUO-NEB) 2.5 MG-0.5 MG/3 ML  3 mL Nebulization Q4H RT    sodium chloride (NS) flush 5-10 mL  5-10 mL IntraVENous PRN    fluconazole (DIFLUCAN) 400mg/200 mL IVPB (premix)  400 mg IntraVENous DAILY    fentaNYL citrate (PF) injection  mcg   mcg IntraVENous Q2H PRN    chlorhexidine (PERIDEX) 0.12 % mouthwash 15 mL  15 mL Oral Q12H    mupirocin calcium (BACTROBAN) 2 % cream   Topical BID    metroNIDAZOLE (FLAGYL) IVPB premix 500 mg  500 mg IntraVENous Q12H    propofol (DIPRIVAN) infusion  0-50 mcg/kg/min IntraVENous TITRATE    glucose chewable tablet 16 g  4 Tab Oral PRN    dextrose (D50W) injection syrg 12.5-25 g  12.5-25 g IntraVENous PRN    glucagon (GLUCAGEN) injection 1 mg  1 mg IntraMUSCular PRN         Objective:      Physical Exam:  Visit Vitals    /64 (BP 1 Location: Left arm, BP Patient Position: At rest)    Pulse (!) 114    Temp 99.4 °F (37.4 °C)    Resp 30    Ht 5' 7\" (1.702 m)    Wt 215 lb 13.3 oz (97.9 kg)    SpO2 91%    BMI 33.8 kg/m2     General Appearance:  Well developed, well nourished,alert and oriented x 0, and individual in no acute distress. Ears/Nose/Mouth/Throat:   Hearing grossly normal.         Neck: Supple. Chest:   Lungs diminished BS bilaterally. Cardiovascular:  Regular rate and rhythm, S1, S2 normal, no murmur. Abdomen:   Soft, non-tender, bowel sounds are active. Extremities: No edema bilaterally. Skin: Warm and dry.                Data Review:   Labs:    Recent Results (from the past 24 hour(s))   GLUCOSE, POC    Collection Time: 01/03/18 11:38 AM   Result Value Ref Range    Glucose (POC) 238 (H) 65 - 100 mg/dL    Performed by Surjit ROUSSEAU, POC    Collection Time: 01/03/18  5:21 PM   Result Value Ref Range    Glucose (POC) 153 (H) 65 - 100 mg/dL    Performed by Mary Brewer, POC    Collection Time: 01/04/18 12:03 AM   Result Value Ref Range    Glucose (POC) 139 (H) 65 - 100 mg/dL    Performed by Fatou Worthington    BLOOD GAS, ARTERIAL    Collection Time: 01/04/18  2:00 AM   Result Value Ref Range    pH 7.39 7.35 - 7.45      PCO2 64 (H) 35.0 - 45.0 mmHg    PO2 44 (LL) 80 - 100 mmHg    O2 SAT 78 (L) 92 - 97 %    BICARBONATE 38 (H) 22 - 26 mmol/L    BASE EXCESS 10.0 mmol/L    O2 METHOD VENTILATOR      FIO2 100 %    MODE PRESSURE CONTROL      SET RATE 30      EPAP/CPAP/PEEP 12.0      Sample source ARTERIAL      SITE RIGHT RADIAL      SYBIL'S TEST YES      Critical value read back Morena Reese RN    METABOLIC PANEL, COMPREHENSIVE    Collection Time: 01/04/18  4:29 AM   Result Value Ref Range    Sodium 139 136 - 145 mmol/L    Potassium 4.4 3.5 - 5.1 mmol/L    Chloride 100 97 - 108 mmol/L    CO2 34 (H) 21 - 32 mmol/L    Anion gap 5 5 - 15 mmol/L    Glucose 102 (H) 65 - 100 mg/dL    BUN 47 (H) 6 - 20 MG/DL    Creatinine 1.30 0.70 - 1.30 MG/DL    BUN/Creatinine ratio 36 (H) 12 - 20      GFR est AA >60 >60 ml/min/1.73m2    GFR est non-AA 57 (L) >60 ml/min/1.73m2    Calcium 8.2 (L) 8.5 - 10.1 MG/DL    Bilirubin, total 0.4 0.2 - 1.0 MG/DL    ALT (SGPT) 41 12 - 78 U/L    AST (SGOT) 56 (H) 15 - 37 U/L    Alk.  phosphatase 50 45 - 117 U/L    Protein, total 4.7 (L) 6.4 - 8.2 g/dL    Albumin 1.5 (L) 3.5 - 5.0 g/dL    Globulin 3.2 2.0 - 4.0 g/dL    A-G Ratio 0.5 (L) 1.1 - 2.2     MAGNESIUM    Collection Time: 01/04/18  4:29 AM   Result Value Ref Range    Magnesium 2.4 1.6 - 2.4 mg/dL   PHOSPHORUS    Collection Time: 01/04/18  4:29 AM   Result Value Ref Range    Phosphorus 2.2 (L) 2.6 - 4.7 MG/DL   GLUCOSE, POC    Collection Time: 01/04/18  5:50 AM   Result Value Ref Range    Glucose (POC) 100 65 - 100 mg/dL    Performed by Fatou Worthington        Telemetry: normal sinus rhythm      Assessment:     Principal Problem:    Acute respiratory failure ([address] (1/1/2018)    Active Problems:    Acute encephalopathy (1/1/2018)      Typical atrial flutter ([facility] [address] (1/1/2018)      Acute on chronic systolic HF (heart failure) ([facility] [address] (1/2/2018)      Acute renal insufficiency (1/2/2018)      SOB (shortness of breath) (1/2/2018)      Abdominal distention (1/2/2018)        Plan:     Remains tachycardiac. Mostly sinus. Off pressors. Continue IV amiodarone, lovenox. Will add low dose beta blocker for better rate control.     Oziel Toledo MD

## 2018-01-04 NOTE — PROGRESS NOTES
Interdisciplinary team rounds were held 1/4/2018  with the following team members:Care Management, Diabetes Treatment Specialist, Nursing, Nutrition, Pharmacy, Physical Therapy and Physician. Plan of care discussed. Goal: adjust medications, ? Bronchoscopy pending. See MD orders and progress notes for further  interventions and desired outcomes.

## 2018-01-04 NOTE — PROGRESS NOTES
Pharmacy Automatic Renal Dosing Protocol - Antimicrobials     Indication for Antimicrobials: Recurrent HAP     Current Regimen of Each Antimicrobial:  Cefepime 2 gm IV every 12 hours (Started 18; Day #4)  Fluconazole 400 mg IV every 24 hours (Started 18; Day #4)  Metronidazole 500 mg IV every 12 hours (Started 18; Day #4)  Vancomycin 1500mg IV q18h (Started 18; Day #4)     Discontinued  Levofloxacin 750 mg IV every 48 hours (Started 18; Day #4)  Vancomycin Trough: 15-20 mcg/mL     Measured / Extrapolated Vancomycin Level:   Vancomycin RANDOM Level (1/3/18 at 0401) = 7.5 mcg/mL     Microbiology Results (Current encounter)   Date/Time Order Name Specimen Source Lab Status   18 1257 CULTURE, FUNGUS Bronchial Aspirate In process   18 1257 KEERTHI, OTHER SOURCES Bronchial Aspirate In process   18 1257 CULTURE, RESPIRATORY/SPUTUM/BRONCH W GRAM STAIN Bronchial Aspirate In process   18 0948 CULTURE, RESPIRATORY/SPUTUM/BRONCH W GRAM STAIN Tracheal Aspirate Final result   18 0025 CULTURE, BLOOD Blood Preliminary result     Estimated Creatinine Clearance: 69 mL/min (based on Cr of 1.3). Estimated Creatinine Clearance (using IBW):57.9 mL/min    Recent Labs      18   0429  18   0401  18   0412   CREA  1.30  1.42*  1.89*   BUN  47*  43*  43*   WBC   --   19.1*  32.4*   BANDS   --    --   6   NA  139  136  136   K  4.4  4.7  5.4*   MG  2.4  2.4  2.0   CA  8.2*  8.4*  7.9*   PHOS  2.2*  3.1  3.7   ALB  1.5*  1.8*  2.1*   HGB   --   13.3  14.9   HCT   --   43.0  48.6   PLT   --   83*  134*   ALT  41  48  62     Temp (24hrs), Av.9 °F (37.2 °C), Min:97.7 °F (36.5 °C), Max:99.4 °F (37.4 °C)    No results found for: PCT     Impression/Plan:   - Patient's DEISI continues to improve thus Cefepime and Vancomycin regimens require dosing adjustment. Adjusted Cefepime to 2gm IV q8h and Vancomycin to 1500mg IV q16h for a projected trough at Css of 16.9.   Will need to order Vancomycin trough before 1/6 8am dose to confirm new dosing regimen. Pharmacy will follow daily and adjust medications as appropriate for renal function and/or serum levels.     Thank you,  Truddie Brunner, [facility]

## 2018-01-04 NOTE — PROGRESS NOTES
Care Management:    Patient remains vented in the ICU and possible bronchoscopy this afternoon. I have sent updates to the [facility], anticipate him transferring to the [place] when medically stable to do so. Fax to [place] is [phone]. I called and left  with significant other Brie Baum letting her know I had updated the [place]. Chart reviewed and we will cont to follow for discharge needs.     Magda Zepeda [facility] ac [phone]

## 2018-01-04 NOTE — PROGRESS NOTES
Nutrition Assessment:    RECOMMENDATIONS:   Consider initiating TF in 24-48h via OGT:   TwoCal HN @ 45mL/h + 75mL H2O flush q 6h (provides 2160kcals/90gPro/1056mL)     ASSESSMENT:   Chart reviewed, case discussed during CCU rounds. Pt remains intubated and sedated on propofol @ 13. 1mL/h which provides 345 kcals daily. OGT to suction with minimal OP. Last BM noted on 12/31. Labs reviewed, Phos 2.2, in need of repletion. Per discussion with Dr. Shayne Dobbs, provided TF recommendations above. Pt being diuresed. Dietitians Intervention(s)/Plan(s): TF recommendations, monitor plan of care  SUBJECTIVE/OBJECTIVE:   Pt intubated and sedated   Diet Order: NPO  % Eaten:  No data found. to suction at   flush with       via OG Tube   Residuals: 345 mL    Pertinent Medications:cefepime, pepcid, humalog, levaquin, flagyl, vancomycin, lasix, solucortef, insulin NPH; Drips: propofol, goody bag. Chemistries:  Lab Results   Component Value Date/Time    Sodium 139 01/04/2018 04:29 AM    Potassium 4.4 01/04/2018 04:29 AM    Chloride 100 01/04/2018 04:29 AM    CO2 34 01/04/2018 04:29 AM    Anion gap 5 01/04/2018 04:29 AM    Glucose 102 01/04/2018 04:29 AM    BUN 47 01/04/2018 04:29 AM    Creatinine 1.30 01/04/2018 04:29 AM    BUN/Creatinine ratio 36 01/04/2018 04:29 AM    GFR est AA >60 01/04/2018 04:29 AM    GFR est non-AA 57 01/04/2018 04:29 AM    Calcium 8.2 01/04/2018 04:29 AM    Albumin 1.5 01/04/2018 04:29 AM      Anthropometrics: Height: 5' 7\" (170.2 cm) Weight: 97.9 kg (215 lb 13.3 oz)   [x]bed scale(1/3)   []stated   []unknown     IBW (%IBW):   ( ) UBW (%UBW):   (  %)    BMI: Body mass index is 33.8 kg/(m^2). This BMI is indicative of:  []Underweight   []Normal   []Overweight   [x] Obesity   [] Extreme Obesity (BMI>40)  Estimated Nutrition Needs (Based on): 2186 Kcals/day (PSU (MSJ 7913)) , 89 g (1gPro/kg) Protein  Carbohydrate:  At Least 130 g/day  Fluids: 1200 mL/day or per MD    Last BM: 12/31   []Active []Hyperactive  [x]Hypoactive       [] Absent   BS  Skin:    [x] Intact   [] Incision  [] Breakdown   [] DTI   [] Tears/Excoriation/Abrasion  [x]Edema(nonpitting-generalized) [] Other: Wt Readings from Last 30 Encounters:   01/03/18 97.9 kg (215 lb 13.3 oz)      NUTRITION DIAGNOSES:   Problem:  Inadequate protein-energy intake      Etiology: related to pt NPO 2' vent      Signs/Symptoms: as evidenced by NPO + propofol meets <25% kcal and 0% protein needs. Previous dx re: inadequate protein energy intake continues, pt remains NPO. NUTRITION INTERVENTIONS:    Enteral/Parenteral Nutrition: Initiate enteral nutrition                GOAL:   Pt will be started on nutrition support in 2-4 days.      NUTRITION MONITORING AND EVALUATION   Previous Goal: Plan of care will be decided re: nutrition in 2-3 days   Previous Goal Met: Progressing (may start on TF in 24h)   Previous Recommendations Implemented: Progressing   Cultural, Restorationism, or Ethnic Dietary Needs: None   LEARNING NEEDS (Diet, Food/Nutrient-Drug Interaction):    [x] None Identified   [] Identified and Education Provided/Documented   [] Identified and Pt declined/was not appropriate      [x] Interdisciplinary Care Plan Reviewed/Documented    [x] Participated in Discharge Planning: Unable to determine    [x] Interdisciplinary Rounds     NUTRITION RISK:    [x] High              [] Moderate           []  Low  []  Minimal/Uncompromised      Reji Arellano RD, [address]   Pager [phone]  Weekend Pager [phone]

## 2018-01-04 NOTE — PROGRESS NOTES
Propofol decreased to 28mcg/kg/min, patient with RASS of -4.   0830 Patient coughing against ET tube and head coming up off of pillow with coughing. Propofol increased back to 20mcg/kg/min. 1045 Patient sats fluctuating 88-90. Dr India Sepulveda notified and in to see patient. 1220 Consent obtained from [address] [place] [address]. Dr. India Sepulveda in and bronchoscopy done on patient. VSS. O2 sat 93%. 1250 Bronchial aspirate specimen to lab.   1600 VSS. O2 sats 90-91% on 100%. PC rate 30.   1900 No changes. Report to Becca Martinez RN.

## 2018-01-04 NOTE — WOUND CARE
Wound care consulted to see this patient for excoriation of the skin on the scrotum and perineum. Some of the skin is pink and some areas with small open skin tears that are scabbed and healing. No s/sx of infection. ES Desitin cream ordered to be applied to the skin three times per day.    Marisela Morelos RN, BSN, [employer]

## 2018-01-04 NOTE — PROGRESS NOTES
Hospitalist Progress Note  Misbah Sr MD  Internal medicine/ Hospitalist    Daily Progress Note: 1/4/2018 5:45 PM      Interval history / Subjective:   Paramjit Michele is a 61 y.o.  male with known history as listed below presents to ED with complaint noted above. Patient with unclear PMH given no records in our system. Per review of ED records and discussion with ED MD given patient is unconscious and likely near intubation and no family/loved ones in room. Records from [address] being reviewed and in brief he is following pulmonary for his \"chronic\" white out of left lung since 2/2017. He has undergone multiple bronchoscopies and antibiotics for this without improvement  -I do not have results of these tests at this time. Girlfriend mentioned. Patient sedated and intubated.     Current Facility-Administered Medications   Medication Dose Route Frequency    metoprolol (LOPRESSOR) injection 1.25 mg  1.25 mg IntraVENous Q6H    zinc oxide-cod liver oil (DESITIN) 40 % paste   Topical BID    cefepime (MAXIPIME) 2 g in 0.9 %  mL IVPB  2 g IntraVENous Q8H    vancomycin (VANCOCIN) 1500 mg in  ml infusion  1,500 mg IntraVENous Q16H    sodium phosphate 30 mmol in 0.9% sodium chloride 500 mL infusion   IntraVENous ONCE    insulin NPH (NOVOLIN N, HUMULIN N) injection 15 Units  15 Units SubCUTAneous Q8H    And    hydrocortisone Sod Succ (PF) (SOLU-CORTEF) injection 50 mg  50 mg IntraVENous Q8H    famotidine (PF) (PEPCID) 20 mg in sodium chloride 0.9 % 10 mL injection  20 mg IntraVENous Q12H    amiodarone (CORDARONE) 900 mg/250 ml D5W infusion  1 mg/min IntraVENous TITRATE    sodium chloride (NS) flush 10-30 mL  10-30 mL InterCATHeter PRN    sodium chloride (NS) flush 10-40 mL  10-40 mL InterCATHeter Q8H    sodium chloride (NS) flush 20 mL  20 mL InterCATHeter Q24H    heparin (porcine) pf 300 Units  300 Units InterCATHeter PRN    0.45% sodium chloride 1,000 mL with sodium bicarbonate (8.4%) 100 mEq, folic acid 1 mg, thiamine 100 mg, mvi, adult no. 4 with vit K 10 mL, ascorbic acid (vitamin C) 500 mg infusion   IntraVENous CONTINUOUS    insulin lispro (HUMALOG) injection   SubCUTAneous Q6H    ondansetron (ZOFRAN) injection 4 mg  4 mg IntraVENous Q4H PRN    labetalol (NORMODYNE;TRANDATE) injection 10 mg  10 mg IntraVENous Q2H PRN    albuterol-ipratropium (DUO-NEB) 2.5 MG-0.5 MG/3 ML  3 mL Nebulization Q2H PRN    albuterol-ipratropium (DUO-NEB) 2.5 MG-0.5 MG/3 ML  3 mL Nebulization Q4H RT    sodium chloride (NS) flush 5-10 mL  5-10 mL IntraVENous PRN    fluconazole (DIFLUCAN) 400mg/200 mL IVPB (premix)  400 mg IntraVENous DAILY    fentaNYL citrate (PF) injection  mcg   mcg IntraVENous Q2H PRN    chlorhexidine (PERIDEX) 0.12 % mouthwash 15 mL  15 mL Oral Q12H    mupirocin calcium (BACTROBAN) 2 % cream   Topical BID    metroNIDAZOLE (FLAGYL) IVPB premix 500 mg  500 mg IntraVENous Q12H    propofol (DIPRIVAN) infusion  0-50 mcg/kg/min IntraVENous TITRATE    glucose chewable tablet 16 g  4 Tab Oral PRN    dextrose (D50W) injection syrg 12.5-25 g  12.5-25 g IntraVENous PRN    glucagon (GLUCAGEN) injection 1 mg  1 mg IntraMUSCular PRN        Objective:     Visit Vitals    /76    Pulse (!) 116    Temp 99.3 °F (37.4 °C)    Resp 28    Ht 5' 7\" (1.702 m)    Wt 97.9 kg (215 lb 13.3 oz)    SpO2 92%    BMI 33.8 kg/m2    O2 Flow Rate (L/min): 15 l/min O2 Device: Ventilator    Temp (24hrs), Av.1 °F (37.3 °C), Min:98.6 °F (37 °C), Max:99.4 °F (37.4 °C)      701 - 1900  In: 1842.8 [I.V.:1842.8]  Out: 1235 [Urine:1010]  1901 -  0700  In: 6046.1 [I.V.:5986.1]  Out: 5716 [[facility]:2904]   P/E  Intubated and sedated. Heent:perrla,at/nc,mouth dry,ET tube present. Neck:supple,no jvd  Lungs:rales both lungs  Heart: tach,s1s2 nl,no m/g  Abdm:soft,not tender,bs present. Extr:no edema,good pedis pulses. Neuro:intubated,sedated,unresponsive.     Data Review    Recent Results (from the past 12 hour(s))   GLUCOSE, POC    Collection Time: 01/04/18  5:50 AM   Result Value Ref Range    Glucose (POC) 100 65 - 100 mg/dL    Performed by Sor[address], POC    Collection Time: 01/04/18 11:58 AM   Result Value Ref Range    Glucose (POC) 97 65 - 100 mg/dL    Performed by Jordana Hwang          Assessment/Plan:     Principal Problem:    Acute respiratory failure ([address] (1/1/2018)    Active Problems:    Acute encephalopathy (1/1/2018)      Typical atrial flutter ([address] (1/1/2018)      Acute on chronic systolic HF (heart failure) ([address] (1/2/2018)      Acute renal insufficiency (1/2/2018)      SOB (shortness of breath) (1/2/2018)      Abdominal distention (1/2/2018)      PNA    Afib    Care Plan   acute hypoxic, hypercarbic respiratory failure suspect acute on chronic causing Acute encephalopathy  Atrial fibrillation with rapid response  Sepsis due to Possible bilateral pneumonia  Hypotension post intubation  Chronic steroid use, possible adrenal insufficiency  Chronic left lung white out of unclear origin  PNA  -steroids to cover both possible adrenal insufficiency and respiratory exacerbation  -nebs prn and scheduled  -remains intubated with vent management per pulmonary  -unclear etiolgoy of chronic left lung white out - may need to discuss with pulmonary at [facility] for further insight and details as to what their next steps were to be and see if we can help further  -not stable for transfer at this time to [facility] which were his wishes  -will empirically treat for HAP given recent [facility] IP stay - cefepime, lq, vanco.   -will continue on diflucan as well given chronic steroids and lung condition  -tailor abx regimen based on culture results  -abg as needed  -lactic acid wnl  -CXR on 1/2/2018:PICC line in satisfactory position. No pneumothorax  -CXR 1/3 c/w dense airspace disease.  -Had cardioversion today,now in suns and on amiodarone drip  -ECHO c/w EF 45 % to 50 %.  There were no regional wall motion abnormalities.  -On levophed  -S/p cardioversion by cardiology. Now in sinus but still tachycardic. On amiodarone. Low dose BB added today 1/4  -Will continue to follow cardiology and pulm recommendations. -S/p bronchoscopy today due to mucus plugging;specimen sent for microbiology,fungal cx and cytology. -Off pressors 1/4     Abdominal distension  -post NGT placement with copious output  -KUB on 1/1/2018 c/w Gastric dilatation. Bilateral airspace disease visualized lung bases. -etiology as to why distension occurred unclear - will follow KUB and discuss with intensivist     Suspect coronary artery disease  -had stress test at [facility] results pending -      Possible DM2  -ssi + poc bs for now  -may need coverage scheduled while on steroids     We do not have medications on record and therefore suspect we will need to augment once records become available.     Patient remains acutely/critically ill with elevated risk for furhter decompensation.  To ICU.      I have personally reviewed the radiographs, laboratory data in Epic and decisions and statements above are based partially on this personal interpretation.     Code Status: Full Code  DVT Prophylaxis: Hep SQ

## 2018-01-04 NOTE — PROGRESS NOTES
[facility] Consult Service Note  Pulmonary, Critical Care, and Sleep Medicine    Name: Jocelynn Jordan MRN: [id number]   : 1960 Hospital: [facility]   Date: 2018   Hospital Day: 4       Subjective/Interval History:   I have reviewed the notes from other providers and old records readily available and summarized findings below with the flowsheet. Seen earlier today on rounds. Patient Active Problem List   Diagnosis Code    Acute encephalopathy G93.40    Acute respiratory failure (HCC) J96.00    Typical atrial flutter (HCC) I48.3    Acute on chronic systolic HF (heart failure) (HCC) I50.23    Acute renal insufficiency N28.9    SOB (shortness of breath) R06.02    Abdominal distention R14.0     18 3:25 PM  Poor gas exchange. Essentially single ( or less) lung ventilation with mostly dead space. Conventional support barely adequate. Updated wife and best friend elma who share mPOA responsibilities. I reached out to , [facility] to discuss management options. Appreciate his time and quidance. ECMO was considered for salvage and support but given pt's long xiong with an unknown left lobar infiltrative process, likelihood of full recovery unlikley. Family was clear when I conveyed the options that pt would not consider portable oxygen or other dependency any quality of life. Decision made to continue with conventional and conservative measures. They agree to make pt a DNR per his wishes and that would include no additional pressors( already on levophed, no shock, no CPR. Will see how he does overnight but is tenuous. Will start low dose IV bicarb and allow permissive hypercapnea. 18 HR still > 150 despite med management. ABG reviewed and d/w RT, nursing. IV bicarb. Cr up. Needs PICC. Unstable but gas exchange marginal as expected.  Family still knows pt is critical but staying the course for know and trying to treat what might be treatable to buy a little more time for pt and meds. 1/3/18 on vent. More PEEP 10 100%; some UOP. DEISI about same. IV levophed 3    1/4 still critically ill on vent. Now PEEP 12!! 100%. Off IV levophed. Creatinine ok. VT now > 500 ml/shows right lung no better. Maybe worse but Left apex now with mild aeration !! IMPRESSION:     1. Acute and possible chronic respiratory failure with severe hypoxia and severe hypercarbia- noting his erytrhocytosis could be from long standing hypoxia, was very difficult to ventilate-but now on higher PEEP and oxygenation more of an issue- I think he is developing some pulmonary edema in his right lunga s well as an effusion but drainage too risky; cannot afford to have pneumothorax on his only \"good\" side  2. Severe unresolved and now worsening atypical bilateral penumonia- not just white out of left lung but also RLL and RMl involvement on post intubation CXR- asymmetric left >> right but almost pan lobar- will likely worsen; showed CXRs to wife at bedside; for now sparing RUL but for how long? Had bronch at the [place] and only E coli treated? 3. Chronic left lobar pneumonic process- interesting Left apex clearing  4. Thrombocytopenia- no bleding but falling  5. DEISI from sepsis and shock- likely ATN- nonoliguric  6. Atrail filbrillation, flutter with RVR despite IV amio- DCC after FLORIAN by cardiology- now sinus tach on IV amiodarone  7. Shock still dependent on IV levophed- hoping [facility] will help-   8. Poor IV access- mPOA agrees  9. Cardiomyopathy LVEF 37% per -had stress test at [facility]   10. possible DM2  11. possisble Rheumatoid arthritis per wife and only just recently given prednisone  12. Medical non complinance- unclear if he ever really took the meds sent to him, wife noted he refused some and declined home O2  13. Pt is requiring Drug therapy requiring intensive monitoring for toxicity  14.  Pt is critically ill and at high risk of end organ dysfunction and failure RECOMMENDATIONS/PLAN:   1. CCU  2. Vent adjusted; Hold on ARDSNET protocol but will adjust accordingly to avoid volutrauma, barotrauma  3. IV Lasix today  4. Sedate but daily SAT  5. Tube feedings after bronch  6. HIT panel  7. CBC in AM off lovenox  8. Bedside bronch and see if there are any organized plugs and perhaps facilitate opening lower airways- spoke to Addie Allan who understands risks and potential benefits. Agrees to proceed  9. Need more than micronutrrents soon  10. PICC - he would not want dialysis if he worsens based on my discussio with family-  6. CXR in AM  12. Bronchial hygiene  13. Monitor UOP  14. pallaitive care consult to support family only  13. May need to paralyze if too much dyssynchrony  16. IV pressors prn to keep SBP > 90  17. Transfer to [facility] when stable at their request- family agreeable  25. IV fluids  19. IV abx will be adjusted per tanvi fnterrance  20. Follow sputum for cultures  21. Pt needs IV fluids with additives and Drug therapy requiring intensive monitoring for toxicity  22. Prescription drug management with home med reconciliation done  23. DVT, SUP prophylaxis  24. Patient requiring restraints due to threat of injury to self, interference with medical devices. 25. Will be available to assist in medical management while in the CCU pending disposition          My assessment/management was discussed with:  Nursing XX    respiratory therapy XX  XX   Family XX wife and mPOA Wilnerkaleb Gens      I have provided   35    minutes of critical care time rendering care exclusive of any procedures. During this entire length of time I was immediately available to the patient.      The reason for providing this level of medical care was due to a critical illness that impaired one or more vital organ systems, such that there was a high probability of imminent or life threatening deterioration in the patient's condition. Pt's condition is unstable and unpredictable.  This care involved high complexity decision making which includes independently reviewing the patient's past medical records, current laboratory results, medication profiles that were immediately available to me and actual Xray images at the bedside in order to assess, support vital system function, and to treat this degree of vital organ system failure, and to prevent further life threatening deterioration of the patients condition. I was in direct communication with the nursing staff throughout this time. I have personally and independently reviewed the patients interval diagnostic lab data, radiographs and the reports. I have ordered additional labs to follow the current medical conditions and to monitor treatment responses over the next 24 hours or sooner if needed. I will order additional imaging to follow longitudinal changes found on the most current imaging. Risk of deterioration: high   [x] High complexity decision making was performed  [x] See my orders for details  Tubes:   Granados and Intubated/Vent  ICU disposition :Unchanged. Updated Family. Code: Partial Code        Subjective/Initial History:   I have reviewed the flowsheet and previous days notes. Seen earlier today on rounds. I was asked by Lore Aragon MD to see Danni Kumar in consultation for a chief complaint of severe bilateral pneumonia    Shayna Michele, 61 y.o. male retired  per wife followed at the [place], with PMHx significant for COPD, CHF, HTN, DM, and breathing difficulties for almost one year presents via EMS to the ED with cc of worsening SOB over the past few days. Per EMS, pt was en route to the [place] but was taken here for immediate care because this was the closest hospital. Per medical records, pt has been recently evaluated several times this past month at the [place] for worsening SOB. He was found in February 2017 to have almost complete white out of his left lung.  He was lost to follow-up but over past month has been admitted for antibiotics, had two bronchoscopies, and is scheduled for a wedge resection of his left lung to determine etiology of the above. One culture grew E Coli and there was some question by his wife about possible fungal infection (but this was not verified on OSH records). Pt also had a stress test Friday. Per patient it was normal. However, records indicate he had an abnormal stress test without reversible defect and an EF of 37% which was up from last noted of 25-30% one year ago. Per records, he was also in [address] 4:1 on 12/29 as well. Per EMS pt was using his oxygen concentrator at 6 but did not have any oxygen tanks. He did not want them at time of discharge per wife. Patient tries to portray a picture of health and does not always see through his follow-up. She states the oxygen is a good example \"where he just didn't want to admit he needed it. \" she notes others have noted him not looking well thepast three months. He was discharged fr the [place] in December and was offered home o2 which he declined. Last week he was well enough to run errands. Last night he had some emesis but non bloody. Pt was coughing up copious purulent sputum. Became hypotensive in the ER. Was hypoxic and severely hypercapneic and intubated in the ER. Discussed with dr. Adilson Andrews and CCU nurkrishna . Pt also being seen by Cardiology for atrail flutter. arrrived getting IV levophed and IV fluid bolus. Very restless, agitated pulling at tubing and resisting staff. Before intubation the pt denied fevers, CP, leg swelling, coughing or known sick contacts. He is on Prednisone at this time. PMH:  has a past medical history of Acute on chronic systolic HF (heart failure) ([facility] [address] (1/2/2018). PSH:   has no past surgical history on file. FHX: family history includes Hypertension in an other family member.    SHX:    The patient is unable to give any meaningful history or review of systems due to patient factors. ROS:Review of systems not obtained due to patient factors. No Known Allergies     MAR reviewed and pertinent medications noted or modified as needed  MEDS:   Current Facility-Administered Medications   Medication    metoprolol (LOPRESSOR) injection 1.25 mg    insulin NPH (NOVOLIN N, HUMULIN N) injection 15 Units    And    hydrocortisone Sod Succ (PF) (SOLU-CORTEF) injection 50 mg    famotidine (PF) (PEPCID) 20 mg in sodium chloride 0.9 % 10 mL injection    vancomycin (VANCOCIN) 1500 mg in  ml infusion    levoFLOXacin (LEVAQUIN) 750 mg in D5W IVPB    amiodarone (CORDARONE) 900 mg/250 ml D5W infusion    sodium chloride (NS) flush 10-30 mL    sodium chloride (NS) flush 10-40 mL    sodium chloride (NS) flush 20 mL    heparin (porcine) pf 300 Units    cefepime (MAXIPIME) 2 g in 0.9% sodium chloride (MBP/ADV) 100 mL    0.45% sodium chloride 1,000 mL with sodium bicarbonate (9.1%) 613 mEq, folic acid 1 mg, thiamine 100 mg, mvi, adult no. 4 with vit K 10 mL, ascorbic acid (vitamin C) 500 mg infusion    insulin lispro (HUMALOG) injection    ondansetron (ZOFRAN) injection 4 mg    labetalol (NORMODYNE;TRANDATE) injection 10 mg    albuterol-ipratropium (DUO-NEB) 2.5 MG-0.5 MG/3 ML    albuterol-ipratropium (DUO-NEB) 2.5 MG-0.5 MG/3 ML    sodium chloride (NS) flush 5-10 mL    fluconazole (DIFLUCAN) 400mg/200 mL IVPB (premix)    fentaNYL citrate (PF) injection  mcg    chlorhexidine (PERIDEX) 0.12 % mouthwash 15 mL    mupirocin calcium (BACTROBAN) 2 % cream    metroNIDAZOLE (FLAGYL) IVPB premix 500 mg    propofol (DIPRIVAN) infusion    glucose chewable tablet 16 g    dextrose (D50W) injection syrg 12.5-25 g    glucagon (GLUCAGEN) injection 1 mg        Objective:     Vital Signs: Intake/Output: Intake/Output:   Temp (24hrs), Av.9 °F (37.2 °C), Min:97.7 °F (36.5 °C), Max:99.4 °F (37.4 °C)    Visit Vitals    BP 90/67    Pulse (!) 117    Temp 98.6 °F (37 °C)    Resp 26    Ht 5' 7\" (1.702 m)    Wt 97.9 kg (215 lb 13.3 oz)    SpO2 93%    BMI 33.8 kg/m2          Telemetry:    atrial fluttter O2 Device: Ventilator  O2 Flow Rate (L/min): 15 l/min    Wt Readings from Last 4 Encounters:   01/03/18 97.9 kg (215 lb 13.3 oz)          Intake/Output Summary (Last 24 hours) at 01/04/18 1331  Last data filed at 01/04/18 1200   Gross per 24 hour   Intake          4044.34 ml   Output             1455 ml   Net          2589.34 ml     Last shift:      01/04 0701 - 01/04 1900  In: 1423.6 [I.V.:1423.6]  Out: 865 [Urine:665]  Last 3 shifts: 01/02 1901 - 01/04 0700  In: 6046.1 [I.V.:5986.1]  Out: 8438 [WOTBF:4440]     Hemodynamics:    CO:    CI:    CVP:    SVR:   PAP Systolic:    PAP Diastolic:    PVR:    PN04:        Ventilator Settings:      Mode Rate TV Press PEEP FiO2 PIP Min. Vent   Pressure control    250 ml    12 cm H20 100 %  31 cm H2O  12.3 l/min      Physical Exam:     General: severely ill WM on vent   HEAD: Normocephalic, without obvious abnormality, atraumatic   EYES: conjunctivae clear. PERRL,  AN Icteric sclerae   NOSE:  nares normal, no drainage, no flaring,    THROAT: intubated; Lips, mucosa dry; ? Oral hygiene;     Neck: Supple, symmetrical, trachea midline,  No accessory mm use; No Stridor/ cuff leak, No goiter or thyroid tenderness   LYMPH: No abnormally enlarged lymph nodes. in neck or groin   Chest: increased AP diameter   Lungs: rales bilaterally and rhonchi   Heart: Regular rate and rhythm  No edema   Abdomen: soft, non-tender, without masses or organomegaly, protuberant   : normal;     Granados   Extremity: negative, clubbing   Neuro: obtunded; sedated; withdraws to pain; unable to check gait and station   Psych:  Unable to assess; 2-3+ agitation   Skin: Warmno lesions noted and no edema;    Pulses:Bilateral, Radial, 1+   Capillary refill: abnormal:  sluggish capillary refill,      Data:   Interval lab and diagnostic data was reviewed.   Interval radiology images were independently viewed and available reports were reviewed. All lab results for the last 24 hours reviewed.           Labs:    Recent Labs      01/03/18   0401  01/02/18   0412   WBC  19.1*  32.4*   HGB  13.3  14.9   PLT  83*  134*     Recent Labs      01/04/18   0429  01/03/18   0401  01/02/18   0412   NA  139  136  136   K  4.4  4.7  5.4*   CL  100  98  98   CO2  34*  35*  31   GLU  102*  269*  331*   BUN  47*  43*  43*   CREA  1.30  1.42*  1.89*   CA  8.2*  8.4*  7.9*   MG  2.4  2.4  2.0   PHOS  2.2*  3.1  3.7   LAC   --   2.5*   --    ALB  1.5*  1.8*  2.1*   SGOT  56*  50*  60*   ALT  41  48  62     Recent Labs      01/04/18   0200  01/03/18   0533  01/02/18   0632   PH  7.39  7.34*  7.25*   PCO2  64*  63*  71*   PO2  44*  49*  59*   HCO3  38*  34*  31*   FIO2  100  100  100     Recent Labs      01/02/18   0412   CPK  34*   CKNDX  4.1*   TROIQ  0.10*     No results found for: BNPP, BNP   Lab Results   Component Value Date/Time    Culture result: RARE NORMAL RESPIRATORY PRESTON 01/01/2018 09:48 AM    Culture result: RARE YEAST 01/01/2018 09:48 AM    Culture result: NO GROWTH 3 DAYS 01/01/2018 12:25 AM     Lab Results   Component Value Date/Time    CK 34 01/02/2018 04:12 AM       No results found for: COLOR, APPRN, SPGRU, ANGELO, PROTU, GLUCU, KETU, BILU, BLDU, UROU, AMBROSE, LEUKU, WBCU, RBCU, UEPI, BACTU, CASTS, UCRY    No results found for: IRON, FE, TIBC, IBCT, PSAT, FERR  No results found for: SR, CRP, HEAVENLY, ANAIGG, RA, RPR, RPRT, VDRLT, VDRLS, TSH, TSHEXT, TSHEXT   No results found for: TOXA1, RPR, HBCM, HBSAG, HAAB, HCAB1, HAAT, G6PD, CRYAC, HIVGT, HIVR, HIV1, HIV12, HIVPC, HIVRPI      Imaging:  I have personally reviewed the patients radiographs and have reviewed the reports:          Results from Hospital Encounter encounter on 01/01/18   XR CHEST PORT   Narrative EXAM:  XR CHEST PORT    INDICATION:  Tube/line placement shortness of breath, atrial flutter, pleural  effusion    COMPARISON:  1/3/2018    FINDINGS: A portable AP radiograph of the chest was obtained at 0432 hours. The  patient is on a cardiac monitor. The ET tube is 9 cm above the ashlee. This could be advanced 4 cm. NG tube  courses into the stomach but the distal tip is not seen. PICC line overlies the  SVC. There is significant opacification of the left hemithorax. There is a fairly  large left pleural effusion. There is airspace disease in left mid and upper  lung zone. It appears that the left lower lobe is probably collapsed. The  airspace disease in left upper lobe has improved slightly. There is fairly severe airspace disease in the right lower lobe and right middle  lobe. Impression IMPRESSION:  1. The left lung continues to be poorly aerated with left pleural effusion and  left lower lobe atelectasis. Airspace disease in left upper lobe has decreased. Airspace disease in right mid and lower lung zone is unchanged. 2. The ET tube could be advanced 4 cm        No results found for this or any previous visit. Thank you for allowing us to participate in the care of this patient. We will be happy to follow along with you.     Wan Lucero MD

## 2018-01-04 NOTE — PROGRESS NOTES
1900 Report received from Florentino Juan RN    2000 Assessment complete. Patient is sedated and intubated. Does not withdrawal to pain. No cough or gag. Pupils equal and reactive to light. Lung sounds are coarse throughout. Bowel sounds are hypoactive. Pulses are palpable. PIVx4 with Right Upper arm PICC line in place with 1/2 Normal saline with Bicarb and multi-vitamins @ 75mL/hr, Propofol @ 38mcg/kg, Amio@ 1. Granados. OG tube @ 54cm, Granados catheter in place. Propofol decreased to 35mcg. 2135 Propofol decreased to 30mcg. 0000 Reassessment complete. Patient now withdrawals to pain and has a cough and gag when suctioning. Does not open eyes to voice. 0400 Reassessment complete. No changes from previous.     0700 Report given to Nisa Thompson RN

## 2018-01-04 NOTE — PROCEDURES
[facility] Consult Service Progress NOTE  Pulmonary, Critical Care, and Sleep Medicine    Name: Kassandra Dockery MRN: [id number]   : 1960 Hospital: [facility]   Date: 2018  Admission Date: 2018       Bronchoscopy Report    Procedure: Therapeutic bronchoscopy. Indication: Mucus Plugging and Pneumonia    Consent/Treatment: Informed consent was obtained from the  family after risks, benefits and alternatives were explained. Timeout verified the correct patient and correct procedure. Anesthesia:   Patient on ventilator and receiving  Propofol drip 40 mcg/kg/hr   Versed  mg IV  Fentanyl  mcg IV    Moderate conscious sedation was administered by the endoscopy nurse and was personally supervised by myself the bronchoscopist. The following parameters were monitored: Oxygen saturation, heart, blood pressure, respiratory rate, EKG, as well as adequacy of pulmonary ventilation and response to care. Total physician intraservice time was over 30 minutes. Details can be found in the procedural flowsheet. Procedure Details:   -- The bronchoscope was introduced through an endotracheal tube. -- The vocal cords not seen. -- The trachea and ashlee were completely inspected and were found to be normal.  -- The right-sided endobronchial anatomy was completely inspected and was reddened but patent. Has distal thick secretions that was cleared with saline washes  -- The left-sided endobronchial anatomy was completely inspected and was reddened but patent. Has distal thick secretions that was cleared with saline washes  .      Specimens:   Bronchial washings were sent for  microbiology, cytology and fungal culture    Rapid On-Site Evaluation: NA    Complications: none    Estimated Blood Loss: none    Samir Amanda MD

## 2018-01-05 NOTE — PROGRESS NOTES
[facility] and stated that [facility] currently at capacity and they could not accept any referrals at time time. CM sent updates to Work Flow Team: [phone]    CM needs to continue to send updates to the [place] on this Pt. CM will continue to monitor discharge plan.       Vianey Pablo, [address]   Ext [phone]

## 2018-01-05 NOTE — PROGRESS NOTES
Hospitalist Progress Note  Katheren Fothergill, MD  Internal medicine/ Hospitalist    Daily Progress Note: 1/5/2018 5:45 PM      Interval history / Subjective:   Larisa Montanez is a 61 y.o.  male with known history as listed below presents to ED with complaint noted above. Patient with unclear PMH given no records in our system. Per review of ED records and discussion with ED MD given patient is unconscious and likely near intubation and no family/loved ones in room. Records from [address] being reviewed and in brief he is following pulmonary for his \"chronic\" white out of left lung since 2/2017. He has undergone multiple bronchoscopies and antibiotics for this without improvement  -I do not have results of these tests at this time. Girlfriend mentioned. Patient sedated and intubated.     Current Facility-Administered Medications   Medication Dose Route Frequency    albuterol-ipratropium (DUO-NEB) 2.5 MG-0.5 MG/3 ML  3 mL Nebulization Q6H RT    metoprolol (LOPRESSOR) injection 1.25 mg  1.25 mg IntraVENous Q4H    apixaban (ELIQUIS) tablet 5 mg  5 mg Per G Tube Q12H    [START ON 1/6/2018] cefepime (MAXIPIME) 2 g in 0.9 %  mL IVPB  2 g IntraVENous Q12H    vancomycin (VANCOCIN) 1250 mg in  ml infusion  1,250 mg IntraVENous Q18H    Vancomycin Trough Reminder  1 Each Other ONCE    zinc oxide-cod liver oil (DESITIN) 40 % paste   Topical BID    methylPREDNISolone (PF) (SOLU-MEDROL) injection 80 mg  80 mg IntraVENous Q6H    bumetanide (BUMEX) injection 2 mg  2 mg IntraVENous Q12H    epoprostenol (VELETRI) 30,000 ng/mL in 0.9% sodium chloride 50 mL inhalation solution  50 ng/kg/min (Ideal) Inhalation CONTINUOUS    0.9% sodium chloride inhalation  22.4 mL/hr Inhalation CONTINUOUS    famotidine (PF) (PEPCID) 20 mg in sodium chloride 0.9 % 10 mL injection  20 mg IntraVENous Q12H    amiodarone (CORDARONE) 900 mg/250 ml D5W infusion  1 mg/min IntraVENous TITRATE    sodium chloride (NS) flush 10-30 mL  10-30 mL InterCATHeter PRN    sodium chloride (NS) flush 10-40 mL  10-40 mL InterCATHeter Q8H    sodium chloride (NS) flush 20 mL  20 mL InterCATHeter Q24H    heparin (porcine) pf 300 Units  300 Units InterCATHeter PRN    0.45% sodium chloride 1,000 mL with sodium bicarbonate (6.3%) 753 mEq, folic acid 1 mg, thiamine 100 mg, mvi, adult no. 4 with vit K 10 mL, ascorbic acid (vitamin C) 500 mg infusion   IntraVENous CONTINUOUS    insulin lispro (HUMALOG) injection   SubCUTAneous Q6H    ondansetron (ZOFRAN) injection 4 mg  4 mg IntraVENous Q4H PRN    labetalol (NORMODYNE;TRANDATE) injection 10 mg  10 mg IntraVENous Q2H PRN    albuterol-ipratropium (DUO-NEB) 2.5 MG-0.5 MG/3 ML  3 mL Nebulization Q2H PRN    sodium chloride (NS) flush 5-10 mL  5-10 mL IntraVENous PRN    fluconazole (DIFLUCAN) 400mg/200 mL IVPB (premix)  400 mg IntraVENous DAILY    fentaNYL citrate (PF) injection  mcg   mcg IntraVENous Q2H PRN    chlorhexidine (PERIDEX) 0.12 % mouthwash 15 mL  15 mL Oral Q12H    mupirocin calcium (BACTROBAN) 2 % cream   Topical BID    metroNIDAZOLE (FLAGYL) IVPB premix 500 mg  500 mg IntraVENous Q12H    propofol (DIPRIVAN) infusion  0-50 mcg/kg/min IntraVENous TITRATE    glucose chewable tablet 16 g  4 Tab Oral PRN    dextrose (D50W) injection syrg 12.5-25 g  12.5-25 g IntraVENous PRN    glucagon (GLUCAGEN) injection 1 mg  1 mg IntraMUSCular PRN        Objective:     Visit Vitals    /66    Pulse (!) 110    Temp 97.7 °F (36.5 °C)    Resp 27    Ht 5' 7\" (1.702 m)    Wt 97.5 kg (214 lb 15.2 oz)    SpO2 91%    BMI 33.67 kg/m2    O2 Flow Rate (L/min): 15 l/min O2 Device: Ventilator    Temp (24hrs), Av.6 °F (37 °C), Min:97.7 °F (36.5 °C), Max:99.2 °F (37.3 °C)      701 - 1900  In: 1243.3 [I.V.:1143.3]  Out: 935 [Urine:935]  1901 - 700  In: 5692.1 [I.V.:5602.1]  Out: 2974 [Urine:2699]   P/E  Intubated and sedated.   Heent:perrla,at/nc,mouth dry,ET tube present. Neck:supple,no jvd  Lungs:rales both lungs  Heart: tach,s1s2 nl,no m/g  Abdm:soft,not tender,bs present. Extr:no edema,good pedis pulses. Neuro:intubated,sedated,unresponsive.     Data Review    Recent Results (from the past 12 hour(s))   GLUCOSE, POC    Collection Time: 01/05/18  6:05 AM   Result Value Ref Range    Glucose (POC) 36 (LL) 65 - 100 mg/dL    Performed by [employer]    GLUCOSE, POC    Collection Time: 01/05/18  6:06 AM   Result Value Ref Range    Glucose (POC) 35 (LL) 65 - 100 mg/dL    Performed by [employer]    GLUCOSE, POC    Collection Time: 01/05/18  6:29 AM   Result Value Ref Range    Glucose (POC) 100 65 - 100 mg/dL    Performed by [employer]    GLUCOSE, POC    Collection Time: 01/05/18 11:35 AM   Result Value Ref Range    Glucose (POC) 78 65 - 100 mg/dL    Performed by [employer] Valentina Rodriguez, POC    Collection Time: 01/05/18 11:37 AM   Result Value Ref Range    Glucose (POC) 86 65 - 100 mg/dL    Performed by [employer]          Assessment/Plan:     Principal Problem:    Acute respiratory failure ([address] (1/1/2018)    Active Problems:    Acute encephalopathy (1/1/2018)      Typical atrial flutter ([address] (1/1/2018)      Acute on chronic systolic HF (heart failure) ([address] (1/2/2018)      Acute renal insufficiency (1/2/2018)      SOB (shortness of breath) (1/2/2018)      Abdominal distention (1/2/2018)      PNA    Afib    Care Plan   acute hypoxic, hypercarbic respiratory failure suspect acute on chronic causing Acute encephalopathy  Atrial fibrillation with rapid response  Sepsis due to Possible bilateral pneumonia  Hypotension post intubation  Chronic steroid use, possible adrenal insufficiency  Chronic left lung white out of unclear origin  PNA  -steroids to cover both possible adrenal insufficiency and respiratory exacerbation  -nebs prn and scheduled  -remains intubated with vent management per pulmonary  -not stable for transfer at this time to [facility] which were his wishes  -will empirically treat for HAP given recent [facility] [place] IP stay - cefepime, lq, vanco.   -will continue on diflucan as well given chronic steroids and lung condition  -abg as needed  -lactic acid wnl  -CXR on 1/2/2018:PICC line in satisfactory position. No pneumothorax  -CXR 1/3 c/w dense airspace disease. -CXR 1/5 c/w severe bilateral interstitial airspace opacities have slightly improved - possibly ARDS  -Had cardioversion 1/3,now in suns and on amiodarone drip  -ECHO c/w EF 45 % to 50 %. There were no regional wall motion abnormalities.  -On levophed  -S/p cardioversion by cardiology. Now in sinus but still tachycardic. On amiodarone. Low dose BB added today 1/4. On eliquis via NGT  -Will continue to follow cardiology and pulm recommendations. -S/p bronchoscopy 1/4 due to mucus plugging;specimen sent for microbiology,fungal cx coming back negative. Cytology results pending  -still on pressors     DEISI:likely from septic shock   -on iv fluid;will monitor.  -creatinine 1.82;if worsening will call nephrology consult     Thrombocytopenia - multifactorial including sepsis,heparin  -d/c sc heparin.  -now on eliquis  -no evidence of bleeding,will continue to monitor    Abdominal distension  -post NGT placement with copious output  -KUB on 1/1/2018 c/w Gastric dilatation. Bilateral airspace disease visualized lung bases. -etiology as to why distension occurred unclear - will follow KUB and discuss with intensivist     Suspect coronary artery disease  -[facility] -      Possible DM2  -ssi      We do not have medications on record and therefore suspect we will need to augment once records become available.     Patient remains acutely/critically ill with elevated risk for furhter decompensation.  To ICU.      I have personally reviewed the radiographs, laboratory data in Epic and decisions and statements above are based partially on this personal interpretation.     Code Status: Partial code  DVT Prophylaxis:on eliquis

## 2018-01-05 NOTE — PROGRESS NOTES
[facility] INTENSIVIST Consult Service Note  Pulmonary, Critical Care, and Sleep Medicine    Name: Matthew Wills MRN: [id number]   : 1960 Hospital: [facility]   Date: 2018   Hospital Day: 5       Subjective/Interval History:   I have reviewed the notes from other providers and old records readily available and summarized findings below with the flowsheet. Seen earlier today on rounds. Patient Active Problem List   Diagnosis Code    Acute encephalopathy G93.40    Acute respiratory failure (HCC) J96.00    Typical atrial flutter (HCC) I48.3    Acute on chronic systolic HF (heart failure) (HCC) I50.23    Acute renal insufficiency N28.9    SOB (shortness of breath) R06.02    Abdominal distention R14.0     18 3:25 PM  Poor gas exchange. Essentially single ( or less) lung ventilation with mostly dead space. Conventional support barely adequate. Updated wife and best friend elma who share mPOA responsibilities. I reached out to , [facility] to discuss management options. Appreciate his time and quidance. ECMO was considered for salvage and support but given pt's long xiong with an unknown left lobar infiltrative process, likelihood of full recovery unlikley. Family was clear when I conveyed the options that pt would not consider portable oxygen or other dependency any quality of life. Decision made to continue with conventional and conservative measures. They agree to make pt a DNR per his wishes and that would include no additional pressors( already on levophed, no shock, no CPR. Will see how he does overnight but is tenuous. Will start low dose IV bicarb and allow permissive hypercapnea. 18 HR still > 150 despite med management. ABG reviewed and d/w RT, nursing. IV bicarb. Cr up. Needs PICC. Unstable but gas exchange marginal as expected.  Family still knows pt is critical but staying the course for know and trying to treat what might be treatable to buy a little more time for pt and meds. 1/3/18 on vent. More PEEP 10 100%; some UOP. DEISI about same. IV levophed 3    1/4 still critically ill on vent. Now PEEP 12!! 100%. Off IV levophed. Creatinine ok. VT now > 500 ml/shows right lung no better. Maybe worse but Left apex now with mild aeration !!    1/5 Last night, early AM: severely low po2 and high pco2 w/severe respiratory acidosis. Chart reviewed. Saturation began to drop around 2000. Lungs: decreased bs w/rhonchi and rales thruout. Cxr: severe bilat pulm edema pattern w/basilar atx. I/o positive by 3L today. pulm mechanics are horrible w/high peak airway pressures on pc 18 and peep 12. We will increase pc and peep despite high airway pressures--there is no alternative. We will start epoprostenol. We will markedly increase steroid dosing and he will be diuresed. Grim situation w/high probability of death  Will advise his significant other. Still on veletri. Responding to IV bumex with some UOP. Still congested. Vent adjusted. Off levophed. Will start low dose TF. Start eliquis. Continue IV abx. IMPRESSION:     1. Acute and possible chronic respiratory failure with severe hypoxia and severe hypercarbia- noting his erytrhocytosis could be from long standing hypoxia, was very difficult to ventilate-but now on higher PEEP and oxygenation more of an issue- I think he is developing some pulmonary edema in his right lunga s well as an effusion but drainage too risky; cannot afford to have pneumothorax on his only \"good\" side  2. ARDS  3. Severe unresolved and now worsening atypical bilateral penumonia- not just white out of left lung but also RLL and RMl involvement on post intubation CXR- asymmetric left >> right but almost pan lobar- will likely worsen; showed CXRs to wife at bedside; for now sparing RUL but for how long? Had bronch at the [place] and only E coli treated? 4.  Chronic left lobar pneumonic process- interesting Left lung now aerating but has bilateral AS disease and edema  5. Thrombocytopenia- no bleeding but falling- off LMWH  6. DEISI from sepsis and shock- likely ATN- nonoliguric  7. Atrail filbrillation, flutter with RVR despite IV amio- DCC after FLORIAN by cardiology- on IV amiodarone  8. Shock still dependent on [facility]- [facility]-   9. Poor IV access- PICC  10. Cardiomyopathy LVEF 37% per -had stress test at [facility]   11. possible DM2  12. possisble Rheumatoid arthritis per wife and only just recently given prednisone  13. Medical non complinance- unclear if he ever really took the meds sent to him, wife noted he refused some and declined home O2  14. Pt is requiring Drug therapy requiring intensive monitoring for toxicity  15. Pt is critically ill and at high risk of end organ dysfunction and failure      RECOMMENDATIONS/PLAN:   1. CCU  2. Vent adjusted;  3. ARDSNET protocol adjust accordingly to avoid volutrauma, barotrauma  4. veletri  5. IV bumex  6. Tube feedings   7. eliquis   8. HIT panel  9. CBC in AM   10. PICC - he would not want dialysis if he worsens based on my discussio with family-  6. CXR in AM  12. Bronchial hygiene  13. Monitor UOP  14. pallaitive care consult to support family only  13. May need to paralyze if too much dyssynchrony  16. IV pressors prn to keep SBP > 90  17. Transfer to [facility] when stable at their request- family agreeable  25. IV fluids  19. IV abx will be adjusted per reanl fnction  20. Follow sputum for cultures  21. Pt needs IV fluids with additives and Drug therapy requiring intensive monitoring for toxicity  22. Prescription drug management with home med reconciliation done  23. DVT, SUP prophylaxis  24. Patient requiring restraints due to threat of injury to self, interference with medical devices.    25. Will be available to assist in medical management while in the CCU pending disposition          My assessment/management was discussed with:  Nursing XX respiratory therapy XX    Family XX wife and Rush Davalos      I have provided   35    minutes of critical care time rendering care exclusive of any procedures. During this entire length of time I was immediately available to the patient.      The reason for providing this level of medical care was due to a critical illness that impaired one or more vital organ systems, such that there was a high probability of imminent or life threatening deterioration in the patient's condition. Pt's condition is unstable and unpredictable. This care involved high complexity decision making which includes independently reviewing the patient's past medical records, current laboratory results, medication profiles that were immediately available to me and actual Xray images at the bedside in order to assess, support vital system function, and to treat this degree of vital organ system failure, and to prevent further life threatening deterioration of the patients condition. I was in direct communication with the nursing staff throughout this time. I have personally and independently reviewed the patients interval diagnostic lab data, radiographs and the reports. I have ordered additional labs to follow the current medical conditions and to monitor treatment responses over the next 24 hours or sooner if needed. I will order additional imaging to follow longitudinal changes found on the most current imaging. Risk of deterioration: high   [x] High complexity decision making was performed  [x] See my orders for details  Tubes:   Granados and Intubated/Vent  ICU disposition :Unchanged. Updated Family. Code: Partial Code        Subjective/Initial History:   I have reviewed the flowsheet and previous days notes. Seen earlier today on rounds.     I was asked by Maureen Loco MD to see iVncenzo Osorio in consultation for a chief complaint of severe bilateral pneumonia    David Michele, 61 y.o. male retired  per wife followed at the [place], with PMHx significant for COPD, CHF, HTN, DM, and breathing difficulties for almost one year presents via EMS to the ED with cc of worsening SOB over the past few days. Per EMS, pt was en route to the [place] but was taken here for immediate care because this was the closest hospital. Per medical records, pt has been recently evaluated several times this past month at the [place] for worsening SOB. He was found in February 2017 to have almost complete white out of his left lung. He was lost to follow-up but over past month has been admitted for antibiotics, had two bronchoscopies, and is scheduled for a wedge resection of his left lung to determine etiology of the above. One culture grew E Coli and there was some question by his wife about possible fungal infection (but this was not verified on OSH records). Pt also had a stress test Friday. Per patient it was normal. However, records indicate he had an abnormal stress test without reversible defect and an EF of 37% which was up from last noted of 25-30% one year ago. Per records, he was also in [address] 4:1 on 12/29 as well. Per EMS pt was using his oxygen concentrator at 6 but did not have any oxygen tanks. He did not want them at time of discharge per wife. Patient tries to portray a picture of health and does not always see through his follow-up. She states the oxygen is a good example \"where he just didn't want to admit he needed it. \" she notes others have noted him not looking well thepast three months. He was discharged frm the [place] in December and was offered home o2 which he declined. Last week he was well enough to run errands. Last night he had some emesis but non bloody. Pt was coughing up copious purulent sputum. Became hypotensive in the ER. Was hypoxic and severely hypercapneic and intubated in the ER. Discussed with dr. Michelle Walters and CCU nurising . Pt also being seen by Cardiology for atrail flutter. arrrived getting IV levophed and IV fluid bolus. Very restless, agitated pulling at tubing and resisting staff. Before intubation the pt denied fevers, CP, leg swelling, coughing or known sick contacts. He is on Prednisone at this time. PMH:  has a past medical history of Acute on chronic systolic HF (heart failure) ([address] (1/2/2018). PSH:   has a past surgical history that includes hc bronch w therapeutic asp (1/4/2018). FHX: family history includes Hypertension in an other family member. SHX:    The patient is unable to give any meaningful history or review of systems due to patient factors. ROS:Review of systems not obtained due to patient factors. No Known Allergies     MAR reviewed and pertinent medications noted or modified as needed  MEDS:   Current Facility-Administered Medications   Medication    apixaban (ELIQUIS) tablet 5 mg    albuterol-ipratropium (DUO-NEB) 2.5 MG-0.5 MG/3 ML    metoprolol (LOPRESSOR) injection 1.25 mg    zinc oxide-cod liver oil (DESITIN) 40 % paste    cefepime (MAXIPIME) 2 g in 0.9 %  mL IVPB    vancomycin (VANCOCIN) 1500 mg in  ml infusion    methylPREDNISolone (PF) (SOLU-MEDROL) injection 80 mg    bumetanide (BUMEX) injection 2 mg    epoprostenol (VELETRI) 30,000 ng/mL in 0.9% sodium chloride 50 mL inhalation solution    0.9% sodium chloride inhalation    famotidine (PF) (PEPCID) 20 mg in sodium chloride 0.9 % 10 mL injection    amiodarone (CORDARONE) 900 mg/250 ml D5W infusion    sodium chloride (NS) flush 10-30 mL    sodium chloride (NS) flush 10-40 mL    sodium chloride (NS) flush 20 mL    heparin (porcine) pf 300 Units    0.45% sodium chloride 1,000 mL with sodium bicarbonate (3.0%) 541 mEq, folic acid 1 mg, thiamine 100 mg, mvi, adult no. 4 with vit K 10 mL, ascorbic acid (vitamin C) 500 mg infusion    insulin lispro (HUMALOG) injection    ondansetron (ZOFRAN) injection 4 mg    labetalol (NORMODYNE;TRANDATE) injection 10 mg    albuterol-ipratropium (DUO-NEB) 2.5 MG-0.5 MG/3 ML    sodium chloride (NS) flush 5-10 mL    fluconazole (DIFLUCAN) 400mg/200 mL IVPB (premix)    fentaNYL citrate (PF) injection  mcg    chlorhexidine (PERIDEX) 0.12 % mouthwash 15 mL    mupirocin calcium (BACTROBAN) 2 % cream    metroNIDAZOLE (FLAGYL) IVPB premix 500 mg    propofol (DIPRIVAN) infusion    glucose chewable tablet 16 g    dextrose (D50W) injection syrg 12.5-25 g    glucagon (GLUCAGEN) injection 1 mg        Objective:     Vital Signs: Intake/Output: Intake/Output:   Temp (24hrs), Av.7 °F (37.1 °C), Min:97.7 °F (36.5 °C), Max:99.3 °F (37.4 °C)    Visit Vitals    /80 (BP 1 Location: Left arm, BP Patient Position: Lying right side)    Pulse (!) 122    Temp 97.7 °F (36.5 °C)    Resp 30    Ht 5' 7\" (1.702 m)    Wt 97.5 kg (214 lb 15.2 oz)    SpO2 (!) 88%    BMI 33.67 kg/m2          Telemetry:    normal sinus rhythm O2 Device: Ventilator  O2 Flow Rate (L/min): 15 l/min    Wt Readings from Last 4 Encounters:   18 97.5 kg (214 lb 15.2 oz)          Intake/Output Summary (Last 24 hours) at 18 1252  Last data filed at 18 1051   Gross per 24 hour   Intake          2037.37 ml   Output             2169 ml   Net          -131.63 ml     Last shift:      701 - 1900  In: -   Out: 350 [Urine:350]  Last 3 shifts: 1901 -  07  In: 4787.3 [I.V.:4697.3]  Out: 2974 [Urine:2699]     Hemodynamics:    CO:    CI:    CVP:    SVR:   PAP Systolic:    PAP Diastolic:    PVR:    ZG75:        Ventilator Settings:      Mode Rate TV Press PEEP FiO2 PIP Min. Vent   Pressure control    250 ml    18 cm H20 100 %  40 cm H2O  15.1 l/min      Physical Exam:     General: severely ill WM on vent   HEAD: Normocephalic, without obvious abnormality, atraumatic   EYES: conjunctivae clear. PERRL,  AN Icteric sclerae   NOSE:  nares normal, no drainage, no flaring,    THROAT: intubated; Lips, mucosa dry; ? Oral hygiene;     Neck: Supple, symmetrical, trachea midline,  No accessory mm use; No Stridor/ cuff leak, No goiter or thyroid tenderness   LYMPH: No abnormally enlarged lymph nodes. in neck or groin   Chest: increased AP diameter   Lungs: rales bilaterally and rhonchi   Heart: Regular rate and rhythm  No edema   Abdomen: soft, non-tender, without masses or organomegaly, protuberant   : normal;     Granados   Extremity: negative, clubbing   Neuro: obtunded; sedated; withdraws to pain; unable to check gait and station   Psych:  Unable to assess; 2-3+ agitation   Skin: Warmno lesions noted and no edema;    Pulses:Bilateral, Radial, 1+   Capillary refill: abnormal:  sluggish capillary refill,      Data:   Interval lab and diagnostic data was reviewed. Interval radiology images were independently viewed and available reports were reviewed. All lab results for the last 24 hours reviewed. Labs:    Recent Labs      01/05/18 0415  01/03/18   0401   WBC  19.8*  19.1*   HGB  12.6  13.3   PLT  57*  83*     Recent Labs      01/05/18   0415  01/04/18   0429  01/03/18   0401   NA  135*  139  136   K  4.4  4.4  4.7   CL  93*  100  98   CO2  37*  34*  35*   GLU  142*  102*  269*   BUN  52*  47*  43*   CREA  1.82*  1.30  1.42*   CA  7.4*  8.2*  8.4*   MG  2.1  2.4  2.4   PHOS  4.0  2.2*  3.1   LAC   --    --   2.5*   ALB  1.2*  1.5*  1.8*   SGOT  92*  56*  50*   ALT  44  41  48     Recent Labs      01/05/18   0400  01/04/18   2127  01/04/18   0200   PH  7.38  7.19*  7.39   PCO2  60*  118*  64*   PO2  36*  41*  44*   HCO3  35*  44*  38*   FIO2  100  100  100     No results for input(s): CPK, CKNDX, TROIQ in the last 72 hours.     No lab exists for component: CPKMB  No results found for: BNPP, BNP   Lab Results   Component Value Date/Time    Culture result: PENDING 01/04/2018 12:57 PM    Culture result: RARE NORMAL RESPIRATORY PRESTON 01/01/2018 09:48 AM    Culture result: RARE YEAST 01/01/2018 09:48 AM     Lab Results   Component Value Date/Time    CK 34 01/02/2018 04:12 AM       No results found for: COLOR, APPRN, SPGRU, ANGELO, PROTU, GLUCU, KETU, BILU, BLDU, UROU, AMBROSE, LEUKU, WBCU, RBCU, UEPI, BACTU, CASTS, UCRY    No results found for: IRON, FE, TIBC, IBCT, PSAT, FERR  No results found for: SR, CRP, HEAVENLY, ANAIGG, RA, RPR, RPRT, VDRLT, VDRLS, TSH, TSHEXT, TSHEXT   No results found for: TOXA1, RPR, HBCM, HBSAG, HAAB, HCAB1, HAAT, G6PD, CRYAC, HIVGT, HIVR, HIV1, HIV12, HIVPC, HIVRPI      Imaging:  I have personally reviewed the patients radiographs and have reviewed the reports:          Results from Hospital Encounter encounter on 01/01/18   XR CHEST PORT   Narrative INDICATION: . change in respiratory status  COMPARISON: Previous chest xray, earlier same day. LIMITATIONS: Portable technique. Kennard Jean FINDINGS: Single frontal view of the chest.   .  Lines/tubes/surgical: No significant change in the appearance of an ET tube,  gastric tube or right-sided PICC. Lungs/pleura: Increasing patchy opacity in the right lung. No significant change  in the right base or left lung. .       Impression IMPRESSION:  1. Increasing patchy opacity in the right lung which could represent edema or  airspace disease. No results found for this or any previous visit. Thank you for allowing us to participate in the care of this patient. We will be happy to follow along with you.     Samir Amanda MD

## 2018-01-05 NOTE — PROGRESS NOTES
Nutrition:  Chart reviewed, case discussed during CCU rounds. Pt remains intubated and sedated on propofol @ 15. 2mL/h which provides 401 kcals daily. PSU 2186. NGT to suction with minimal OP. Per discussion with Dr. Ross Maxwell, will initiate TF today. Start TwoCal HN @ 10mL/h, advance rate 10mL q 8h as tolerated to Goal Rate of 35mL/h + 1 packet Prosource BID + 75mL H2O flush q 6h (provides 1800kcals/100gPro/888mL)    Will continue to monitor pt's case closely. Thank you!     Alexandra, [address]   Pager [phone]

## 2018-01-05 NOTE — PROGRESS NOTES
Pharmacy Automatic Renal Dosing Protocol - Antimicrobials     Indication for Antimicrobials: Recurrent HAP     Current Regimen of Each Antimicrobial:  Cefepime 2 gm IV every 12 hours (Started 18; Day #5)  Fluconazole 400 mg IV every 24 hours (Started 18; Day #5)  Metronidazole 500 mg IV every 12 hours (Started 18; Day #5)  Vancomycin 1500mg IV q18h (Started 18; Day #5)     Discontinued  Levofloxacin 750 mg IV every 48 hours (Started 18; Day #4)  Vancomycin Goal Trough: 15-20 mcg/mL     Measured / Extrapolated Vancomycin Level:   Vancomycin RANDOM Level (1/3/18 at 0401) = 7.5 mcg/mL     Microbiology Results (Current encounter)   Date/Time Order Name Specimen Source Lab Status   18 1257 CULTURE, FUNGUS Bronchial Aspirate In process   18 1257 KEERTHI, OTHER SOURCES Bronchial Aspirate Final result   18 1257 CULTURE, RESPIRATORY/SPUTUM/BRONCH W GRAM STAIN Bronchial Aspirate Preliminary result   18 0948 CULTURE, RESPIRATORY/SPUTUM/BRONCH W GRAM STAIN Tracheal Aspirate Final result   18 0025 CULTURE, BLOOD Blood Preliminary result     Estimated Creatinine Clearance: 49.2 mL/min (based on Cr of 1.82). Estimated Creatinine Clearance (using IBW):41.4 mL/min    Recent Labs      18   0415  18   0429  18   0401   CREA  1.82*  1.30  1.42*   BUN  52*  47*  43*   WBC  19.8*   --   19.1*   NA  135*  139  136   K  4.4  4.4  4.7   MG  2.1  2.4  2.4   CA  7.4*  8.2*  8.4*   PHOS  4.0  2.2*  3.1   ALB  1.2*  1.5*  1.8*   HGB  12.6   --   13.3   HCT  40.8   --   43.0   PLT  57*   --   83*   ALT  44  41  48     Temp (24hrs), Av.7 °F (37.1 °C), Min:97.7 °F (36.5 °C), Max:99.3 °F (37.4 °C)    No results found for: PCT     Impression/Plan:   Vancomycin and Cefepime were adjusted  for improved SCr/CrCl. SCr rise this am thus will adjust Vancomycin for estimated CrCl 41mL/min to 1250mg IV q18h (projected trough at Css pg 18.3) and Cefepime to 2gm IV q12.       Persistent Leukocytosis although improved. Afebrile  All Cx negative thusfar (rare yeast on Tach Sputum)    Given changes in SCr, Vancomycin and Cefepime may need daily adjustments and perhaps every other day levels to monitor for accumulation. Vancomycin trough before 1/5 20:00 dose. Continue Cefepime, Vancomycin, Flagyl and Fluconazole per rounds discussion. Pharmacy will follow daily and adjust medications as appropriate for renal function and/or serum levels.   Thank you,  Camelia Rey, [facility]

## 2018-01-05 NOTE — PROGRESS NOTES
CTSP re: severely low po2 and high pco2 w/severe respiratory acidosis. Chart reviewed. Saturation began to drop around 2000. Lungs: decreased bs w/rhonchi and rales thruout. Cxr: severe bilat pulm edema pattern w/basilar atx. I/o positive by 3L today. pulm mechanics are horrible w/high peak airway pressures on pc 18 and peep 12. We will increase pc and peep despite high airway pressures--there is no alternative. We will start epoprostenol. We will markedly increase steroid dosing and he will be diuresed. Grim situation w/high probability of death  Will advise his significant other. Called Tate Yadav: no answer, message left.

## 2018-01-05 NOTE — PROGRESS NOTES
1900 Report received from Kateryna Vaughn RN    2000 Assessment complete. Patient is intubated and sedated. Does withdrawal to pain. Pupils equal and reactive to light. Patient does have cough and gag. Does not attempt to open eyes. Lung sounds are coarse. Thick tan sputum present when suction. Bowel sounds are hypoactive. Pulses are palpable. Right upper arm PICC line with 1/2 Normal Saline with Bicarb and multi-vitamins infusing @ 75ml/hr, Propofol infusing @ 30mcg/kg/min, and Amio@ 1mg. Granados catheter draining clear yellow urine. 2040 Patient oxygen sats in the low 80's on 100% FIO2. MD and Respiratory therapist aware. 2125 Patient oxygen sats dropped to 77% on 100% FIO2. Order placed for ABG. 2136 Page pulmonlogy. 2145 Spoke with Dr. Bonnie Royal. Updated on patient condition. Received order for stat CXR. Dr. Bonnie Royal coming in to see patient. 26 Dr. Bonnie Royal at bedside. Received new orders. Message left for significant other Gordon La Feria. 2350 Patient blood sugar 47. Repeat 49. 1 amp of D50 given will recheck blood sugar in 15 mins. 0000 Reassessment complete. 0015 Blood sugar recheck was 103.    0041 Veletri started. 0400 Reassessment complete. No changes from previous. Patient oxygen sats remain in the 70-80's.    0450 Giovanni (patient's significant other) called. Updated on patient condition. Informed her that patient is not doing well. Oxygen sats are in the 70's and patient blood pressure has dropped and skin color is not good. Gordon Mcbride stated that she would be up to visit later this morning. 3279 Patient blood sugar 35. Repeat is 37. 1 amp D50 given. Will recheck in 15 mins.     0700 Report given to NIMISHA العلي

## 2018-01-05 NOTE — PROGRESS NOTES
[address] [address]  [phone]      Cardiology Progress Note      1/5/2018 9:00 AM    Admit Date: 1/1/2018    Admit Diagnosis:   Acute encephalopathy    Subjective:     Colin Michele remains intubated on 100% with 14 peep, sedated. Afib with RVR on 1/1 amio and Q6H IV BB, but not controlling. Bp stable so can tolerate slight increase in BB.     Plts dropping at 57, Dr. Joy Moncada started Eliquis, lovenox discontinued  Bronch yesterday for plugging, ABGs showing acidosis, hypercapnea with CO2 118 yesterday, improving with vent changes  WBC remains elevated at 19.8  Diuresing well with 2.4L out yesterday, remains ahead 10L    Visit Vitals    /87    Pulse (!) 112    Temp 98.3 °F (36.8 °C)    Resp 27    Ht 5' 7\" (1.702 m)    Wt 97.5 kg (214 lb 15.2 oz)    SpO2 91%    BMI 33.67 kg/m2       Current Facility-Administered Medications   Medication Dose Route Frequency    apixaban (ELIQUIS) tablet 5 mg  5 mg Oral Q12H    albuterol-ipratropium (DUO-NEB) 2.5 MG-0.5 MG/3 ML  3 mL Nebulization Q6H RT    metoprolol (LOPRESSOR) injection 1.25 mg  1.25 mg IntraVENous Q4H    zinc oxide-cod liver oil (DESITIN) 40 % paste   Topical BID    cefepime (MAXIPIME) 2 g in 0.9 %  mL IVPB  2 g IntraVENous Q8H    vancomycin (VANCOCIN) 1500 mg in  ml infusion  1,500 mg IntraVENous Q16H    methylPREDNISolone (PF) (SOLU-MEDROL) injection 80 mg  80 mg IntraVENous Q6H    bumetanide (BUMEX) injection 2 mg  2 mg IntraVENous Q12H    epoprostenol (VELETRI) 30,000 ng/mL in 0.9% sodium chloride 50 mL inhalation solution  50 ng/kg/min (Ideal) Inhalation CONTINUOUS    0.9% sodium chloride inhalation  22.4 mL/hr Inhalation CONTINUOUS    famotidine (PF) (PEPCID) 20 mg in sodium chloride 0.9 % 10 mL injection  20 mg IntraVENous Q12H    amiodarone (CORDARONE) 900 mg/250 ml D5W infusion  1 mg/min IntraVENous TITRATE    sodium chloride (NS) flush 10-30 mL  10-30 mL InterCATHeter PRN    sodium chloride (NS) flush 10-40 mL  10-40 mL InterCATHeter Q8H    sodium chloride (NS) flush 20 mL  20 mL InterCATHeter Q24H    heparin (porcine) pf 300 Units  300 Units InterCATHeter PRN    0.45% sodium chloride 1,000 mL with sodium bicarbonate (9.8%) 737 mEq, folic acid 1 mg, thiamine 100 mg, mvi, adult no. 4 with vit K 10 mL, ascorbic acid (vitamin C) 500 mg infusion   IntraVENous CONTINUOUS    insulin lispro (HUMALOG) injection   SubCUTAneous Q6H    ondansetron (ZOFRAN) injection 4 mg  4 mg IntraVENous Q4H PRN    labetalol (NORMODYNE;TRANDATE) injection 10 mg  10 mg IntraVENous Q2H PRN    albuterol-ipratropium (DUO-NEB) 2.5 MG-0.5 MG/3 ML  3 mL Nebulization Q2H PRN    sodium chloride (NS) flush 5-10 mL  5-10 mL IntraVENous PRN    fluconazole (DIFLUCAN) 400mg/200 mL IVPB (premix)  400 mg IntraVENous DAILY    fentaNYL citrate (PF) injection  mcg   mcg IntraVENous Q2H PRN    chlorhexidine (PERIDEX) 0.12 % mouthwash 15 mL  15 mL Oral Q12H    mupirocin calcium (BACTROBAN) 2 % cream   Topical BID    metroNIDAZOLE (FLAGYL) IVPB premix 500 mg  500 mg IntraVENous Q12H    propofol (DIPRIVAN) infusion  0-50 mcg/kg/min IntraVENous TITRATE    glucose chewable tablet 16 g  4 Tab Oral PRN    dextrose (D50W) injection syrg 12.5-25 g  12.5-25 g IntraVENous PRN    glucagon (GLUCAGEN) injection 1 mg  1 mg IntraMUSCular PRN       Objective:      Physical Exam:  General Appearance:  elderly appearing  male intubated  Chest:   Coarse rhonchi throughout  Cardiovascular: irr, irr no murmur.   Abdomen:   Soft, non-tender, bowel sounds are active.   Extremities: trace LE edema  Skin:  Warm and dry.     Data Review:   Recent Labs      01/05/18 0415 01/03/18   0401   WBC  19.8*  19.1*   HGB  12.6  13.3   HCT  40.8  43.0   PLT  57*  83*     Recent Labs      01/05/18   0415  01/04/18   0429  01/03/18   0401   NA  135*  139  136   K  4.4  4.4  4.7   CL  93*  100  98   CO2  37*  34*  35*   GLU  142* 102*  269*   BUN  52*  47*  43*   CREA  1.82*  1.30  1.42*   CA  7.4*  8.2*  8.4*   MG  2.1  2.4  2.4   PHOS  4.0  2.2*  3.1   ALB  1.2*  1.5*  1.8*   TBILI  0.5  0.4  0.4   SGOT  92*  56*  50*   ALT  44  41  48       No results for input(s): TROIQ, CPK, CKMB in the last 72 hours. Intake/Output Summary (Last 24 hours) at 01/05/18 1138  Last data filed at 01/05/18 1051   Gross per 24 hour   Intake          2142.17 ml   Output             2469 ml   Net          -326.83 ml        Telemetry: afib with RVR    Assessment:     Principal Problem:    Acute respiratory failure ([facility] (1/1/2018)    Active Problems:    Acute encephalopathy (1/1/2018)      Typical atrial flutter ([facility] (1/1/2018)      Acute on chronic systolic HF (heart failure) ([facility] (1/2/2018)      Acute renal insufficiency (1/2/2018)      SOB (shortness of breath) (1/2/2018)      Abdominal distention (1/2/2018)        Plan:     Aflutter with RVR/afib with RVR:  Successful cardioversion for aflutter, but now with PAF at 100-130bpm.  Likely due to his underlying pulmonary issues  Continue on IV amio 1/1, rebolus if rate increases  BP stable, increase metoprolol IV 1.25mg to Q4H for better rate control. Lovenox d/c'd.  KDLMR8 vasc Score: 2. Started on Eliquis for prevention. Watch for increased bleeding with thrombocytopenia    Acute on chronic systolic HF:  Echo shows EF 45-50%  Continue with diuresing,   +10L with fluid resuscitation, weight up 20#s (? Accuracy) since admission. Pharmacy to work with decreasing daily volume  Request for [facility] records pending  According to significant other, patient not compliant with medications, diet, f/u appointments  Monitor I/Os, daily weights, labs  [facility]    1/5/2018         Agree with note as outlined by  NP. I confirm findings in history and physical exam. No additional findings noted. Agree with plan as outlined above.  Discussed with significant other.    Rylan Paredes MD

## 2018-01-05 NOTE — DIABETES MGMT
DTC Progress Note    Recommendations/ Comments: Pt discussed with rounding team and Dr. Gene Borja. Plan to discontinue NPH at this time. Pt now with hypoglycemia over night and this am.  Steroids changed to solu-medrol last night. Plan to monitor. Pt will likely require resumption of NPH with increased steroids. Would consider resuming NPH back at initial dose of 8units timed with each steroids dose if BG consistently >180mg/dL. Chart reviewed on Chriss Michele during Multidisciplinary Rounds. A1c:   Lab Results   Component Value Date/Time    Hemoglobin A1c 9.0 01/02/2018 04:12 AM           Recent Glucose Results:   Lab Results   Component Value Date/Time     (H) 01/05/2018 04:15 AM    GLUCPOC 100 01/05/2018 06:29 AM    GLUCPOC 35 (LL) 01/05/2018 06:06 AM    GLUCPOC 36 (LL) 01/05/2018 06:05 AM        Lab Results   Component Value Date/Time    Creatinine 1.82 01/05/2018 04:15 AM     Estimated Creatinine Clearance: 49.2 mL/min (based on Cr of 1.82). Active Orders   Diet    DIET NPO        PO intake: No data found. Will continue to follow as needed. Thank you.   EMANUEL FinnN, RN, [address]

## 2018-01-05 NOTE — INTERDISCIPLINARY ROUNDS
Interdisciplinary team rounds were held 1/5/18 with the following team members:Care Management, Diabetes Treatment Specialist, Nursing, Nutrition, Palliative Care, Pharmacy, Physical Therapy, Physician, Respiratory Therapy and Clinical Coordinator. Plan of care discussed. Goal: See MD orders and progress notes for further  interventions and desired outcomes.

## 2018-01-05 NOTE — PROGRESS NOTES
Saw family as entered CCU. Sister, best friend, and niece in room. Clary (pt's partner of 17 yrs) was not present at the time. Family was curious about pt head movements and arm movements.  conveyed that concern to Melinda Cao. RN explained that pt is only on light sedation, but as long as he is not pulling at tube it is okay. [address] will continue to check on this pt as family as able/needed. For Spiritual Care paging please call [facility]([phone]). Zaida Wooten M.Div.   Palliative  Fellow

## 2018-01-05 NOTE — ROUTINE PROCESS
Bedside and Verbal shift change [facility] (offgoing nurse). Report included the following information SBAR, Kardex, ED Summary, Procedure Summary, Intake/Output, MAR, Accordion, Recent Results, Med Rec Status and Cardiac Rhythm Atrial fibrillation. Ozzie Swan 0930:Family members at the bedside. He is moving his extremities and his head, otherwise, suctioned for a small amount of thick tan sputum. Bath completed, no skin breakdown noted. Repositioned for comfort. 1100:Status is unchanged. Suctioned for a small amount of chunky blood tinge sputum, and repositioned for comfort. 1200:Lungs with expiratory wheezing on the left, otherwise status is unchanged. 1400:Family members in. Tube feeding initiated, continue the current plan of care. 1600:Family members remains at the bedside. He is tolerating the current mechanical ventilator settings. 1800: [place] and skin care completed. 1925:Bedside and Verbal shift change report given to [facility] by myself (offgoing nurse). Report included the following information SBAR, Kardex, ED Summary, Procedure Summary, Intake/Output, MAR, Accordion, Recent Results, Med Rec Status and Cardiac Rhythm atrial fibrillation is unchanged. .Family friend remains at the bedside.

## 2018-01-06 NOTE — PROGRESS NOTES
Hospitalist Progress Note    NAME: Sheryl Dave   :  1960   MRN:  [id number]       Assessment / Plan:  Acute hypoxic & hypercarbic respiratory failure intubated on vent:  -  Continue with bronchodilators  -  Continue with inhaled corticosteroids  -  Continue with corticosteroids  -  Continue with antibiotics  -  Pulm. On case  -CXR on 2018:PICC line in satisfactory position. No pneumothorax  -CXR 1/3 c/w dense airspace disease. -CXR  c/w severe bilateral interstitial airspace opacities have slightly improved - possibly ARDS  -S/p bronchoscopy  due to mucus plugging;specimen sent for microbiology,fungal cx coming back negative. Cytology results pending    Acute Pneumonia (HAP) with acute sepsis in pt with recent [facility] hospitalization:  - Continue with antibiotics to cover for HAP and sepsis due PNA : cefepime and vanc.  - continue with diflucan  - Chronic left lung white out of unclear origin  - Monitor CXR    Acute hypoxic encephalopathy:  - no change   - continue treating underlying factors (PNA, infection, hypoxia)    AFIB with RVR:  - continue with amiodarone IV  - Had cardioversion 1/3,now in suns and on amiodarone drip   - On eliquis via NGT  - on metoprolol    Hypotension post intubation:  - better off pressors now  - dose of amiod decreased    Acute on chronic systolic  - Cardio on case  - continue metoprolol  -ECHO c/w EF 45 % to 50 %. There were no regional wall motion abnormalities. Acute renal insufficiency likely from chronic steroid use:  - continue steroids    Abdominal distention (2018)  - better  -post NGT placement with copious output  -KUB on 2018 c/w Gastric dilatation. Bilateral airspace disease visualized lung bases.   -etiology as to why distension occurred unclear - will follow KUB and discuss with intensivist      DEISI: likely from septic shock   - worsening  - on iv fluid;will monitor.  - monitor labs  - Consider nephro but family is starting to back off with possible CMO paaliative on case     Thrombocytopenia - multifactorial including sepsis,heparin  -d/c sc heparin.  -now on eliquis  -no evidence of bleeding,will continue to monitor      Suspect coronary artery disease  -had stress test at [facility] results pending -       Possible DM2  - Diabetic management with accuchecks, ISS    Prognosis poor at this time    Code Status: Partial code  DVT Prophylaxis:on eliquis    Body mass index is 33.29 kg/(m^2). Code status: Partial  Prophylaxis: SCD's  Recommended Disposition:  PT, OT, RN     Subjective:     Chief Complaint / Reason for Physician Visit  \"Recheck respiratory failure\". Discussed with RN events overnight. Remains intubated. Unable to obtain history or ROS due to clinical condition. But according to nurse the family want to start being less aggressive with treatment and at this point he will remain intubated and on amiodarone which had to be decreased due to hypotension. Review of Systems:  Symptom Y/N Comments  Symptom Y/N Comments   Fever/Chills    Chest Pain     Poor Appetite    Edema     Cough    Abdominal Pain     Sputum    Joint Pain     SOB/DOBSON    Pruritis/Rash     Nausea/vomit    Tolerating PT/OT     Diarrhea    Tolerating Diet     Constipation    Other       Could NOT obtain due to: Unresponsive state and sedation     Objective:     VITALS:   Last 24hrs VS reviewed since prior progress note.  Most recent are:  Patient Vitals for the past 24 hrs:   Temp Pulse Resp BP SpO2   01/06/18 1800 - 96 30 95/55 91 %   01/06/18 1714 - (!) 105 30 - 92 %   01/06/18 1700 - (!) 102 27 100/73 92 %   01/06/18 1600 - (!) 105 30 95/64 (!) 88 %   01/06/18 1541 98.8 °F (37.1 °C) (!) 105 30 97/67 (!) 89 %   01/06/18 1500 - (!) 115 30 (!) 84/63 94 %   01/06/18 1415 - - - - 96 %   01/06/18 1400 - (!) 104 30 100/72 93 %   01/06/18 1347 - (!) 104 29 - 94 %   01/06/18 1300 - (!) 109 30 94/74 96 %   01/06/18 1234 - (!) 110 26 100/69 96 %   01/06/18 1230 - (!) 104 30 (!) 84/62 96 %   01/06/18 1204 - (!) 116 30 95/69 -   01/06/18 1200 - (!) 104 30 (!) 84/59 94 %   01/06/18 1130 98.7 °F (37.1 °C) (!) 110 30 90/58 95 %   01/06/18 1100 - (!) 110 30 100/58 94 %   01/06/18 1030 - (!) 106 30 92/50 94 %   01/06/18 1000 - (!) 112 30 102/64 95 %   01/06/18 0955 - - - - 93 %   01/06/18 0930 - 87 30 102/52 92 %   01/06/18 0929 - - - - 92 %   01/06/18 0900 - 99 28 113/58 (!) 87 %   01/06/18 0830 - 88 30 106/53 93 %   01/06/18 0800 - 88 29 107/54 93 %   01/06/18 0730 98.5 °F (36.9 °C) 88 30 107/55 93 %   01/06/18 0700 - 88 30 107/54 92 %   01/06/18 0630 - 88 30 105/54 92 %   01/06/18 0600 - 89 30 109/56 93 %   01/06/18 0530 - 100 30 112/58 93 %   01/06/18 0500 - 99 30 110/55 93 %   01/06/18 0430 - 99 28 108/57 92 %   01/06/18 0400 99.1 °F (37.3 °C) (!) 102 28 102/61 92 %   01/06/18 0351 - (!) 107 30 - 92 %   01/06/18 0300 - (!) 116 30 106/63 92 %   01/06/18 0230 - (!) 117 30 109/67 92 %   01/06/18 0200 - (!) 124 30 112/73 92 %   01/06/18 0130 - (!) 132 30 110/76 94 %   01/06/18 0104 - - - - 94 %   01/06/18 0100 - 97 30 108/73 94 %   01/06/18 0030 - 90 30 121/66 94 %   01/06/18 0001 - 93 30 117/68 94 %   01/05/18 2336 - 90 30 - 94 %   01/05/18 2330 - 88 30 108/67 93 %   01/05/18 2300 - 89 30 114/64 93 %   01/05/18 2230 - 93 30 108/66 93 %   01/05/18 2200 - 93 30 112/64 93 %   01/05/18 2130 - 99 30 107/69 94 %   01/05/18 2100 - 97 29 125/70 96 %   01/05/18 2030 - 100 30 115/61 94 %   01/05/18 2000 98.9 °F (37.2 °C) (!) 105 29 109/66 95 %   01/05/18 1931 - - - - 94 %   01/05/18 1930 - 94 30 117/70 95 %   01/05/18 1914 - 97 30 - 94 %   01/05/18 1900 - 96 26 118/65 94 %   01/05/18 1830 - 100 30 113/74 94 %       Intake/Output Summary (Last 24 hours) at 01/06/18 1826  Last data filed at 01/06/18 1400   Gross per 24 hour   Intake          3171.76 ml   Output              710 ml   Net          2461.76 ml        PHYSICAL EXAM:  General: Appears ill, sedated and unresponsive    EENT:  PERRL but sluggish  Resp:  Bilateral rhonchi  CV:  Regular  rhythm,  No edema  GI:  Soft, Non distended, Non tender.  +Bowel sounds  Neurologic:  Sedated   Psych:   Sedated    Reviewed most current lab test results and cultures  YES  Reviewed most current radiology test results   YES  Review and summation of old records today    NO  Reviewed patient's current orders and MAR    YES  PMH/SH reviewed - no change compared to H&P  ________________________________________________________________________  Care Plan discussed with:    Comments   Patient     Family      RN x    Care Manager     Consultant                        Multidiciplinary team rounds were held today with , nursing, pharmacist and clinical coordinator. Patient's plan of care was discussed; medications were reviewed and discharge planning was addressed. ________________________________________________________________________  Total NON critical care TIME:  15   Minutes    Total CRITICAL CARE TIME Spent:   Minutes non procedure based      Comments   >50% of visit spent in counseling and coordination of care     ________________________________________________________________________  Hugh Schaumann, MD     Procedures: see electronic medical records for all procedures/Xrays and details which were not copied into this note but were reviewed prior to creation of Plan. LABS:  I reviewed today's most current labs and imaging studies.   Pertinent labs include:  Recent Labs      01/05/18 0415   WBC  19.8*   HGB  12.6   HCT  40.8   PLT  57*     Recent Labs      01/06/18   0408  01/05/18 0415  01/04/18   0429   NA  141  135*  139   K  4.1  4.4  4.4   CL  98  93*  100   CO2  34*  37*  34*   GLU  192*  142*  102*   BUN  75*  52*  47*   CREA  2.62*  1.82*  1.30   CA  8.1*  7.4*  8.2*   MG   --   2.1  2.4   PHOS   --   4.0  2.2*   ALB   --   1.2*  1.5*   TBILI   --   0.5  0.4   SGOT   --   92*  56*   ALT   --   44  41   INR  1.2*   --    -- Signed: Courtney Allison MD

## 2018-01-06 NOTE — ROUTINE PROCESS
Bedside and Verbal shift change report received from [facility] LAN HORNE (offgoing nurse). Report included the following information SBAR, Kardex, ED Summary, Procedure Summary, Intake/Output, MAR, Accordion, Recent Results, Med Rec Status and Cardiac Rhythm Atrial fibrillation and Flutter, which is unchanged. [place] Waddell He is resting with his eyes closed, no facial grimaces noted. Skin care completed, noted bleeding from the penile meatus. He is tolerating the mechanical ventilator settings well. The tube feeding residual of 60cc's. 0745:Propofol decreased to 35mcq/kg/min; he remains lethargic, but he will raise his head and arms. Suctioned for a small amount of thick tan sputum and repositioned for comfort. 1015:Repositoned to his side, and suctioned for a small amount of thick tan sputum. 1150:Noted with lower blood pressures, therefore I decreased the FentaNyl to 50mcq/hour  1235: His blood pressure remains low, therefore I held the Metoprolol, and decreased the Propofol to 30mcq/kg/min  1430: at the bedside, Amiodarone drip decreased to .5mg/minute  1600:Family friend at the bedside,  heart rate will increase to minimal stimulation. Otherwise, his assessment is unchanged. No facial grimaces noted, continue the current plan of care. 1745: His status is unchanged, his wife and friends are at the bedside. 1845:Full bath completed, excoriation over the scrotum noted, which is unchanged. 1930:Bedside and Verbal shift change report given to CASSIE Whipple RN (oncoming nurse) by myself (offgoing nurse). Report included the following information SBAR, Kardex, ED Summary, Procedure Summary, Intake/Output, MAR, Accordion, Recent Results, Med Rec Status and Cardiac Rhythm atrial fib,flutter. [place] Waddell

## 2018-01-06 NOTE — CARDIO/PULMONARY
C/P rehab note- chart reviewed.     Adm with Acute Resp failure,A-flutter,Acute on Chronic Systolic Heart Failure,Acute Encephalopathy. LVEF 37% per Dr. Hernandez Flatten intubated and sedated. Palliative care to support family. Code status updated. CP Rehab deferred.

## 2018-01-06 NOTE — PROGRESS NOTES
01/06/18 0559   ABCDEF Bundle   SBT Safety Screen Passed No   SBT Screen Reason for Failure FiO2 > 50%;PEEP > 7.5

## 2018-01-06 NOTE — PROGRESS NOTES
SW received call from [place] transfer center #[phone] Ext # [phone] who requested for updates for transfer. EFRAIN spoke with MD who reported that Pt is not stable for transfer. This information was relayed to Licha Morelos at the [place]. Licha Morelos reported that they would F/U tomorrow regarding Pt stability.     Efrain Emery, [facility]   Ext # [phone]

## 2018-01-06 NOTE — PROGRESS NOTES
Pharmacy Automatic Renal Dosing Protocol - Antimicrobials/Vancomycin    Indication for Antimicrobials: Recurrent HAP    Current Regimen of Each Antimicrobial:  Cefepime 2 gm IV every 12 hours (Started 18; Day #6)  Fluconazole 400 mg IV every 24 hours (Started 18; Day #6)  Metronidazole 500 mg IV every 12 hours (Started 18; Day #6)  Vancomycin dosed by pharmacy (Started 18; Day #6)    Previous Antimicrobial exposure during current admission:  Levofloxacin 750 mg IV every 48 hours (Started 18; LOT: 4 days)    Vancomycin Trough: 15-20 mcg/mL    Measured / Extrapolated Vancomycin Level:   Vancomycin RANDOM Level (1/3/18 at 0401) = 7.5 mcg/mL    Significant Cultures:   18 Respiratory culture = Rare yeast (FINAL)  18 Blood culture = No growth x 2 days (Results pending)    Labs:  Recent Labs      18   0408  18   0415  18   0429   CREA  2.62*  1.82*  1.30   BUN  75*  52*  47*   WBC   --   19.8*   --      Temp (24hrs), Av.5 °F (36.9 °C), Min:97.7 °F (36.5 °C), Max:99.1 °F (37.3 °C)    Creatinine Clearance (mL/min) or Dialysis: 28 mL/min    No results found for: PCT    Impression/Plan:    Scr increased from 1.82-->2.62   New DEISI warrants dose adjustment for cefepime (2 g q 12 hours--> q 24 hours as CrCl <30) and fluconazole (400 mg IV q 24 hours-->200 mg IV q 24 hours as CrCl <50)   The scheduled vancomycin dose has been discontinued and vancomycin will be dosed \"per levels\" as renal function is currently too unstable for a consistent maintenance regimen due to fluctuations of medication clearance. Once renal function stabilizes, the patient will be placed back on a scheduled vancomycin maintenance regimen.  A random vancomycin level has been ordered to be collected with 1/7 am labs   Daily BMP ordered per protocol     Pharmacy will follow daily and adjust medications as appropriate for renal function and/or serum levels.     Thank you,    Yazmin Odonnell, PharmD, [facility]

## 2018-01-06 NOTE — PROGRESS NOTES
[facility] INTENSIVIST Consult Service Note  Pulmonary, Critical Care, and Sleep Medicine    Name: Louie Marion MRN: [id number]   : 1960 Hospital: [facility]   Date: 2018   Hospital Day: 6       Subjective/Interval History:   I have reviewed the notes from other providers and old records readily available and summarized findings below with the flowsheet. Seen earlier today on rounds. Patient Active Problem List   Diagnosis Code    Acute encephalopathy G93.40    Acute respiratory failure (HCC) J96.00    Typical atrial flutter (HCC) I48.3    Acute on chronic systolic HF (heart failure) (HCC) I50.23    Acute renal insufficiency N28.9    SOB (shortness of breath) R06.02    Abdominal distention R14.0     18 3:25 PM  Poor gas exchange. Essentially single ( or less) lung ventilation with mostly dead space. Conventional support barely adequate. Updated wife and best friend elma who share mPOA responsibilities. I reached out to , [facility] to discuss management options. Appreciate his time and quidance. ECMO was considered for salvage and support but given pt's long xiong with an unknown left lobar infiltrative process, likelihood of full recovery unlikley. Family was clear when I conveyed the options that pt would not consider portable oxygen or other dependency any quality of life. Decision made to continue with conventional and conservative measures. They agree to make pt a DNR per his wishes and that would include no additional pressors( already on levophed, no shock, no CPR. Will see how he does overnight but is tenuous. Will start low dose IV bicarb and allow permissive hypercapnea. 18 HR still > 150 despite med management. ABG reviewed and d/w RT, nursing. IV bicarb. Cr up. Needs PICC. Unstable but gas exchange marginal as expected.  Family still knows pt is critical but staying the course for know and trying to treat what might be treatable to buy a little more time for pt and meds. 1/3/18 on vent. More PEEP 10 100%; some UOP. DEISI about same. IV levophed 3    1/4 still critically ill on vent. Now PEEP 12!! 100%. Off IV levophed. Creatinine ok. VT now > 500 ml/shows right lung no better. Maybe worse but Left apex now with mild aeration !!    1/5 Last night, early AM: severely low po2 and high pco2 w/severe respiratory acidosis. Chart reviewed. Saturation began to drop around 2000. Lungs: decreased bs w/rhonchi and rales thruout. Cxr: severe bilat pulm edema pattern w/basilar atx. I/o positive by 3L today. pulm mechanics are horrible w/high peak airway pressures on pc 18 and peep 12. We will increase pc and peep despite high airway pressures--there is no alternative. We will start epoprostenol. We will markedly increase steroid dosing and he will be diuresed. Grim situation w/high probability of death  Will advise his significant other. Still on veletri. Responding to IV bumex with some UOP. Still congested. Vent adjusted. Off levophed. Will start low dose TF. Start eliquis. Continue IV abx.    1/6/18 on veletri. sats marginal on PEEP 18 100%. IV bumex. Moderate secretions. No more cuff leak after RT adjusted ETT tube. High on my review of CXR    IMPRESSION:     1. Acute and possible chronic respiratory failure with severe hypoxia and severe hypercarbia- noting his erytrhocytosis could be from long standing hypoxia, was very difficult to ventilate-but now on higher PEEP and oxygenation more of an issue- I think he is developing some pulmonary edema in his right lunga s well as an effusion but drainage too risky; cannot afford to have pneumothorax on his only \"good\" side  2. ARDS  3.  Severe unresolved and now worsening atypical bilateral penumonia- not just white out of left lung but also RLL and RMl involvement on post intubation CXR- asymmetric left >> right but almost pan lobar- will likely worsen; showed CXRs to wife at bedside; for now sparing RUL but for how long? Had bronch at the [place] and only E coli treated? 4. Chronic left lobar pneumonic process- interesting Left lung now aerating but has bilateral AS disease and edema  5. Thrombocytopenia- no bleeding but falling- off LMWH  6. DEISI from sepsis and shock- likely ATN- nonoliguric but worse!!  7. Atrail filbrillation, flutter with RVR despite IV amio- DCC after FLORIAN by cardiology- on IV amiodarone  8. Shock still dependent on IV levophed- hoping [facility] will help-   9. Poor IV access- PICC  10. Cardiomyopathy LVEF 37% per -had stress test at [facility]   11. possible DM2  12. possisble Rheumatoid arthritis per wife and only just recently given prednisone  13. Medical non complinance- unclear if he ever really took the meds sent to him, wife noted he refused some and declined home O2  14. Pt is requiring Drug therapy requiring intensive monitoring for toxicity  15. Pt is critically ill and at high risk of end organ dysfunction and failure      RECOMMENDATIONS/PLAN:   1. CCU  2. ETT adjusted  3. Vent adjusted; PCIRV as needed  4. ARDSNET protocol adjust accordingly to avoid volutrauma, barotrauma  5. veletri  6. Stop IV bumex  7. Tube feedings   8. eliquis   9. HIT panel  10. CBC in AM   11. PICC - he would not want dialysis if he worsens based on my discussio with family-  15. CXR in AM  13. Bronchial hygiene  14. Monitor UOP  15. pallaitive care consult to support family only  12. May need to paralyze if too much dyssynchrony  17. IV pressors prn to keep SBP > 90  18. Transfer to [facility] when stable at their request- family agreeable  23. IV fluids  20. IV abx will be adjusted per tanvi fnction  21. Follow sputum for cultures  22. Pt needs IV fluids with additives and Drug therapy requiring intensive monitoring for toxicity  23. Prescription drug management with home med reconciliation done  24. DVT, SUP prophylaxis  25.  Patient requiring restraints due to threat of injury to self, interference with medical devices. 26. Will be available to assist in medical management while in the CCU pending disposition          My assessment/management was discussed with:  Nursing XX    respiratory therapy XX    Family XX wife and Rush Alexander      I have provided   35    minutes of critical care time rendering care exclusive of any procedures. During this entire length of time I was immediately available to the patient.      The reason for providing this level of medical care was due to a critical illness that impaired one or more vital organ systems, such that there was a high probability of imminent or life threatening deterioration in the patient's condition. Pt's condition is unstable and unpredictable. This care involved high complexity decision making which includes independently reviewing the patient's past medical records, current laboratory results, medication profiles that were immediately available to me and actual Xray images at the bedside in order to assess, support vital system function, and to treat this degree of vital organ system failure, and to prevent further life threatening deterioration of the patients condition. I was in direct communication with the nursing staff throughout this time. I have personally and independently reviewed the patients interval diagnostic lab data, radiographs and the reports. I have ordered additional labs to follow the current medical conditions and to monitor treatment responses over the next 24 hours or sooner if needed. I will order additional imaging to follow longitudinal changes found on the most current imaging. Risk of deterioration: high   [x] High complexity decision making was performed  [x] See my orders for details  Tubes:   Granados and Intubated/Vent  ICU disposition :Unchanged. Updated Family. Code: Partial Code        Subjective/Initial History:   I have reviewed the flowsheet and previous days notes.  Seen earlier today on rounds. I was asked by Maged Rojas MD to see Leonardo Daily in consultation for a chief complaint of severe bilateral pneumonia    Cory Michele, 61 y.o. male retired  per wife followed at the [place], with PMHx significant for COPD, CHF, HTN, DM, and breathing difficulties for almost one year presents via EMS to the ED with cc of worsening SOB over the past few days. Per EMS, pt was en route to the [place] but was taken here for immediate care because this was the closest hospital. Per medical records, pt has been recently evaluated several times this past month at the [place] for worsening SOB. He was found in February 2017 to have almost complete white out of his left lung. He was lost to follow-up but over past month has been admitted for antibiotics, had two bronchoscopies, and is scheduled for a wedge resection of his left lung to determine etiology of the above. One culture grew E Coli and there was some question by his wife about possible fungal infection (but this was not verified on OSH records). Pt also had a stress test Friday. Per patient it was normal. However, records indicate he had an abnormal stress test without reversible defect and an EF of 37% which was up from last noted of 25-30% one year ago. Per records, he was also in [address] 4:1 on 12/29 as well. Per EMS pt was using his oxygen concentrator at 6 but did not have any oxygen tanks. He did not want them at time of discharge per wife. Patient tries to portray a picture of health and does not always see through his follow-up. She states the oxygen is a good example \"where he just didn't want to admit he needed it. \" she notes others have noted him not looking well thepast three months. He was discharged frm the [place] in December and was offered home o2 which he declined. Last week he was well enough to run errands. Last night he had some emesis but non bloody. Pt was coughing up copious purulent sputum.  Became hypotensive in the ER. Was hypoxic and severely hypercapneic and intubated in the ER. Discussed with dr. Chidi Mancini and CCU sheree . Pt also being seen by Cardiology for atrail flutter. arrrived getting IV levophed and IV fluid bolus. Very restless, agitated pulling at tubing and resisting staff. Before intubation the pt denied fevers, CP, leg swelling, coughing or known sick contacts. He is on Prednisone at this time. PMH:  has a past medical history of Acute on chronic systolic HF (heart failure) ([address] (1/2/2018). PSH:   has a past surgical history that includes hc bronch w therapeutic asp (1/4/2018). FHX: family history includes Hypertension in an other family member. SHX:    The patient is unable to give any meaningful history or review of systems due to patient factors. ROS:Review of systems not obtained due to patient factors.     No Known Allergies     MAR reviewed and pertinent medications noted or modified as needed  MEDS:   Current Facility-Administered Medications   Medication    albuterol-ipratropium (DUO-NEB) 2.5 MG-0.5 MG/3 ML    metoprolol (LOPRESSOR) injection 1.25 mg    apixaban (ELIQUIS) tablet 5 mg    cefepime (MAXIPIME) 2 g in 0.9 %  mL IVPB    fentaNYL (PF) 900 mcg/30 ml infusion soln    vancomycin 1,000 mg in 0.9% sodium chloride 250 mL IVPB    zinc oxide-cod liver oil (DESITIN) 40 % paste    methylPREDNISolone (PF) (SOLU-MEDROL) injection 80 mg    bumetanide (BUMEX) injection 2 mg    epoprostenol (VELETRI) 30,000 ng/mL in 0.9% sodium chloride 50 mL inhalation solution    0.9% sodium chloride inhalation    famotidine (PF) (PEPCID) 20 mg in sodium chloride 0.9 % 10 mL injection    amiodarone (CORDARONE) 900 mg/250 ml D5W infusion    sodium chloride (NS) flush 10-30 mL    sodium chloride (NS) flush 10-40 mL    sodium chloride (NS) flush 20 mL    heparin (porcine) pf 300 Units    0.45% sodium chloride 1,000 mL with sodium bicarbonate (2.1%) 260 mEq, folic acid 1 mg, thiamine 100 mg, mvi, adult no. 4 with vit K 10 mL, ascorbic acid (vitamin C) 500 mg infusion    insulin lispro (HUMALOG) injection    ondansetron (ZOFRAN) injection 4 mg    labetalol (NORMODYNE;TRANDATE) injection 10 mg    albuterol-ipratropium (DUO-NEB) 2.5 MG-0.5 MG/3 ML    sodium chloride (NS) flush 5-10 mL    fluconazole (DIFLUCAN) 400mg/200 mL IVPB (premix)    fentaNYL citrate (PF) injection  mcg    chlorhexidine (PERIDEX) 0.12 % mouthwash 15 mL    metroNIDAZOLE (FLAGYL) IVPB premix 500 mg    propofol (DIPRIVAN) infusion    glucose chewable tablet 16 g    dextrose (D50W) injection syrg 12.5-25 g    glucagon (GLUCAGEN) injection 1 mg        Objective:     Vital Signs: Intake/Output: Intake/Output:   Temp (24hrs), Av.5 °F (36.9 °C), Min:97.7 °F (36.5 °C), Max:99.1 °F (37.3 °C)    Visit Vitals    /54    Pulse 88    Temp 99.1 °F (37.3 °C)    Resp 30    Ht 5' 7\" (1.702 m)    Wt 96.4 kg (212 lb 8.4 oz)    SpO2 92%    BMI 33.29 kg/m2          Telemetry:    normal sinus rhythm O2 Device: Ventilator  O2 Flow Rate (L/min): 15 l/min    Wt Readings from Last 4 Encounters:   18 96.4 kg (212 lb 8.4 oz)          Intake/Output Summary (Last 24 hours) at 18 0737  Last data filed at 18 0700   Gross per 24 hour   Intake          3573.12 ml   Output             1740 ml   Net          1833.12 ml     Last shift:         Last 3 shifts:  190 -  0700  In: 5696.7 [I.V.:5301.7]  Out: 3065 [Urine:3040]     Hemodynamics:    CO:    CI:    CVP:    SVR:   PAP Systolic:    PAP Diastolic:    PVR:    AD81:        Ventilator Settings:      Mode Rate TV Press PEEP FiO2 PIP Min. Vent   Pressure control    250 ml    18 cm H20 100 %  51 cm H2O  15.9 l/min      Physical Exam:     General: severely ill WM on vent   HEAD: Normocephalic, without obvious abnormality, atraumatic   EYES: conjunctivae clear.  PERRL,  AN Icteric sclerae   NOSE:  nares normal, no drainage, no flaring,    THROAT: intubated; Lips, mucosa dry; ? Oral hygiene;     Neck: Supple, symmetrical, trachea midline,  No accessory mm use; No Stridor/ cuff leak, No goiter or thyroid tenderness   LYMPH: No abnormally enlarged lymph nodes. in neck or groin   Chest: increased AP diameter   Lungs: rales bilaterally and rhonchi   Heart: Regular rate and rhythm  No edema   Abdomen: soft, non-tender, without masses or organomegaly, protuberant   : normal;     Granados   Extremity: negative, clubbing   Neuro: obtunded; sedated; withdraws to pain; unable to check gait and station   Psych:  Unable to assess; 2-3+ agitation   Skin: Warmno lesions noted and no edema;    Pulses:Bilateral, Radial, 1+   Capillary refill: abnormal:  sluggish capillary refill,      Data:   Interval lab and diagnostic data was reviewed. Interval radiology images were independently viewed and available reports were reviewed. All lab results for the last 24 hours reviewed. Labs:    Recent Labs      01/06/18 0408 01/05/18   0415   WBC   --   19.8*   HGB   --   12.6   PLT   --   57*   INR  1.2*   --    APTT  57.1*   --      Recent Labs      01/06/18   0408 01/05/18   0415  01/04/18   0429   NA  141  135*  139   K  4.1  4.4  4.4   CL  98  93*  100   CO2  34*  37*  34*   GLU  192*  142*  102*   BUN  75*  52*  47*   CREA  2.62*  1.82*  1.30   CA  8.1*  7.4*  8.2*   MG   --   2.1  2.4   PHOS   --   4.0  2.2*   ALB   --   1.2*  1.5*   SGOT   --   92*  56*   ALT   --   44  41     Recent Labs      01/05/18   0400  01/04/18 2127  01/04/18   0200   PH  7.38  7.19*  7.39   PCO2  60*  118*  64*   PO2  36*  41*  44*   HCO3  35*  44*  38*   FIO2  100  100  100     No results for input(s): CPK, CKNDX, TROIQ in the last 72 hours.     No lab exists for component: CPKMB  No results found for: BNPP, BNP   Lab Results   Component Value Date/Time    Culture result: NO GROWTH AFTER 20 HOURS 01/04/2018 12:57 PM    Culture result: RARE NORMAL RESPIRATORY PRESTON 01/01/2018 09:48 AM    Culture result: RARE YEAST 01/01/2018 09:48 AM     Lab Results   Component Value Date/Time    Vancomycin,trough 27.9 01/05/2018 08:17 PM    CK 34 01/02/2018 04:12 AM       No results found for: COLOR, APPRN, SPGRU, ANGELO, PROTU, GLUCU, KETU, BILU, BLDU, UROU, AMBROSE, LEUKU, WBCU, RBCU, UEPI, BACTU, CASTS, UCRY    No results found for: IRON, FE, TIBC, IBCT, PSAT, FERR  No results found for: SR, CRP, HEAVENLY, ANAIGG, RA, RPR, RPRT, VDRLT, VDRLS, TSH, TSHEXT, TSHEXT   No results found for: TOXA1, RPR, HBCM, HBSAG, HAAB, HCAB1, HAAT, G6PD, CRYAC, HIVGT, HIVR, HIV1, HIV12, HIVPC, HIVRPI      Imaging:  I have personally reviewed the patients radiographs and have reviewed the reports:          Results from Hospital Encounter encounter on 01/01/18   XR CHEST PORT   Narrative INDICATION: Intubated. Respiratory failure. COMPARISON: January 5, 2018    FINDINGS: Single AP portable view of the chest obtained at 3:46 AM demonstrates  no change in position of the endotracheal tube, right arm PICC line, or  nasogastric tube. The cardiomediastinal silhouette is stable. Diffuse bilateral  interstitial and alveolar opacities are unchanged. No pneumothorax is seen. Small left pleural effusion is stable. Impression IMPRESSION: Stable appearance of the chest.          No results found for this or any previous visit. Thank you for allowing us to participate in the care of this patient. We will be happy to follow along with you.     Emiliano Lomeli MD

## 2018-01-06 NOTE — PROGRESS NOTES
1900 Verbal report received from [place], RN    2000 Assessment complete. Patient is intubated and sedated. Does withdrawal to pain. Pupils are equal and reactive to light. Lung sounds are coarse throughout. Bowel sounds are active. Pulses are palpable. Right upper arm PICC line with Propofol infusing @ 35mcg/kg/min, Amio @1, 1/2 normal Saline with Sodium Bicarb and multi-vitamins @ 74mL/hr. PIVx3. Granados catheter draining clear yellow urine. OG tube with [place] HN infusing @ 10mL/hr.     2100 Patient stacking breaths on ventilator. Propofol increased to 50mcg/kg/min. 2108 Continues to stack breaths on ventilator. Fentanyl given per orders. 2145 Patient continues to be difficult to ventilate. Patient continues to stack breaths. Oxygen sats are now 90% on 100% FIO2    2158 Spoke with Dr. Christopher Gloria and received orders for Fentanyl gtt. 2200 Patient no longer stacking breaths. Will hold Fentanyl gtt for now. 0000 Reassessment complete. No changes from previous. 0130 Patient heart rate 130's. Afebrile. 0155 Patient peak pressure in high 50-60's. Stacking his breaths. Fentanyl gtt started. 0400 Reassessment complete. No changes from previous.     0700 Report given to NIMISHA العلي

## 2018-01-06 NOTE — PROGRESS NOTES
Vancomycin trough resulted 27.9 (extrapolated to 30) likely due to decreased renal function this am and not at steady state since last dosage adjustment. Will reduce dose and extend interval to 1000 mg Q24H for an expected trough of 16.6, . Will follow for changes in renal function and adjust as necessary.     Thank you,  Janusz Del Valle, RIAND

## 2018-01-07 NOTE — PROGRESS NOTES
1/7/2018 12:25 PM    Admit Date: 1/1/2018    Admit Diagnosis:   Acute encephalopathy    Subjective:     [facility] Major remains intubated. Current Facility-Administered Medications   Medication Dose Route Frequency    insulin glargine (LANTUS) injection 5 Units  5 Units SubCUTAneous DAILY    Vancomycin Dosing Per levels   Other DAILY    cefepime (MAXIPIME) 2 g in 0.9 %  mL IVPB  2 g IntraVENous Q24H    famotidine (PF) (PEPCID) 20 mg in sodium chloride 0.9 % 10 mL injection  20 mg IntraVENous Q24H    fluconazole (DIFLUCAN) 200mg/100 mL IVPB (premix)  200 mg IntraVENous DAILY    methylPREDNISolone (PF) (SOLU-MEDROL) injection 40 mg  40 mg IntraVENous Q6H    metoclopramide HCl (REGLAN) injection 5 mg  5 mg IntraVENous Q6H    budesonide (PULMICORT) 500 mcg/2 ml nebulizer suspension  500 mcg Nebulization BID RT    NOREPINephrine (LEVOPHED) 32 mg in 5% dextrose 250 mL infusion  2-30 mcg/min IntraVENous TITRATE    albuterol-ipratropium (DUO-NEB) 2.5 MG-0.5 MG/3 ML  3 mL Nebulization Q6H RT    metoprolol (LOPRESSOR) injection 1.25 mg  1.25 mg IntraVENous Q4H    fentaNYL (PF) 900 mcg/30 ml infusion soln  0-200 mcg/hr IntraVENous TITRATE    zinc oxide-cod liver oil (DESITIN) 40 % paste   Topical BID    epoprostenol (VELETRI) 30,000 ng/mL in 0.9% sodium chloride 50 mL inhalation solution  50 ng/kg/min (Ideal) Inhalation CONTINUOUS    0.9% sodium chloride inhalation  22.4 mL/hr Inhalation CONTINUOUS    amiodarone (CORDARONE) 900 mg/250 ml D5W infusion  0.5 mg/min IntraVENous TITRATE    sodium chloride (NS) flush 10-30 mL  10-30 mL InterCATHeter PRN    sodium chloride (NS) flush 10-40 mL  10-40 mL InterCATHeter Q8H    sodium chloride (NS) flush 20 mL  20 mL InterCATHeter Q24H    heparin (porcine) pf 300 Units  300 Units InterCATHeter PRN    0.45% sodium chloride 1,000 mL with sodium bicarbonate (9.7%) 333 mEq, folic acid 1 mg, thiamine 100 mg, mvi, adult no. 4 with vit K 10 mL, ascorbic acid (vitamin C) 500 mg infusion   IntraVENous CONTINUOUS    insulin lispro (HUMALOG) injection   SubCUTAneous Q6H    ondansetron (ZOFRAN) injection 4 mg  4 mg IntraVENous Q4H PRN    labetalol (NORMODYNE;TRANDATE) injection 10 mg  10 mg IntraVENous Q2H PRN    albuterol-ipratropium (DUO-NEB) 2.5 MG-0.5 MG/3 ML  3 mL Nebulization Q2H PRN    sodium chloride (NS) flush 5-10 mL  5-10 mL IntraVENous PRN    fentaNYL citrate (PF) injection  mcg   mcg IntraVENous Q2H PRN    chlorhexidine (PERIDEX) 0.12 % mouthwash 15 mL  15 mL Oral Q12H    metroNIDAZOLE (FLAGYL) IVPB premix 500 mg  500 mg IntraVENous Q12H    propofol (DIPRIVAN) infusion  0-50 mcg/kg/min IntraVENous TITRATE    glucose chewable tablet 16 g  4 Tab Oral PRN    dextrose (D50W) injection syrg 12.5-25 g  12.5-25 g IntraVENous PRN    glucagon (GLUCAGEN) injection 1 mg  1 mg IntraMUSCular PRN         Objective:      Physical Exam:    Visit Vitals    /60 (BP 1 Location: Left arm, BP Patient Position: At rest)    Pulse 86    Temp 99.3 °F (37.4 °C)    Resp 30    Ht 5' 7\" (1.702 m)    Wt 229 lb 8 oz (104.1 kg)    SpO2 91%    BMI 35.94 kg/m2     Gen: Sedated, intubated  Chest and Lung Exam   Inspection: Accessory muscles - No use of accessory muscles in breathing. Auscultation:   Breath sounds: - decreased bilaterally  Cardiovascular   Inspection: Jugular vein - Bilateral - Inspection Normal.   Palpation/Percussion:   Apical Impulse: - Normal.   Auscultation: Rhythm - irregular. Heart Sounds - S1 WNL and S2 WNL. No S3 or S4. Murmurs & Other Heart Sounds: Auscultation of the heart reveals - No Murmurs. Peripheral Vascular   Upper Extremity: Inspection - Bilateral - No Cyanotic nailbeds or Digital clubbing. Lower Extremity:   Palpation: Edema - Bilateral - diffuse 1+ edema. Abdomen:   Soft, non-tender, bowel sounds are active.   Neuro: Sedated, intubated    Data Review:   Recent Labs      01/07/18   0445  01/05/18   0415   WBC  20.7* 19.8*   HGB  12.5  12.6   HCT  39.6  40.8   PLT  71*  57*     Recent Labs      01/07/18   0445  01/06/18   0408  01/05/18   0415   NA  136  141  135*   K  5.2*  4.1  4.4   CL  95*  98  93*   CO2  35*  34*  37*   GLU  251*  192*  142*   BUN  105*  75*  52*   CREA  3.80*  2.62*  1.82*   CA  7.8*  8.1*  7.4*   MG  2.4   --   2.1   PHOS  7.1*   --   4.0   ALB  1.2*   --   1.2*   TBILI  0.4   --   0.5   SGOT  71*   --   92*   ALT  34   --   44   INR   --   1.2*   --        No results for input(s): TROIQ, CPK, CKMB in the last 72 hours. Intake/Output Summary (Last 24 hours) at 01/07/18 1225  Last data filed at 01/07/18 0700   Gross per 24 hour   Intake          3100.73 ml   Output               98 ml   Net          3002.73 ml        Telemetry: a flutter, rate controlled      Assessment:     Principal Problem:    Acute respiratory failure ([address] (1/1/2018)    Active Problems:    Acute encephalopathy (1/1/2018)      Typical atrial flutter ([address] (1/1/2018)      Acute on chronic systolic HF (heart failure) ([address] (1/2/2018)      Acute renal insufficiency (1/2/2018)      SOB (shortness of breath) (1/2/2018)      Abdominal distention (1/2/2018)        Plan:     Charles Leslie with RVR/afib with RVR:  Successful cardioversion for aflutter, but now with rate controlled flutter. Andres Del Rosario due to his underlying pulmonary issues  Continue on IV amio, decreased to 0.5 1/7/18  BP stable, continue low-dose IV metoprolol  Lovenox d/c'd.  CHADS2 vasc Score: 2.  Started on Eliquis for prevention.  Watch for increased bleeding with thrombocytopenia      Volume overload:  · Due to a combination of volume given and progressive acute renal failure  · CVP is 18-20 on 1/7/2018  · Echo shows EF 45-50%  · Initially attempted diuresis, but fluids started due to acute renal failure  · Continues with significantly positive fluid balance (16 L for admission, 3 L positive overnight as of 1/7/2018)  · According to significant other, patient not compliant with medications, diet, f/u appointments  · Monitor I/Os, daily weights, labs  · Renal consulted today for progressive renal failure with volume overload question of aggressive diuretics plus or minus dialysis/ultrafiltration     Prognosis is severely guarded. Continue supportive care.

## 2018-01-07 NOTE — PROGRESS NOTES
1900-Bedside report received from [address].   2000-Shift assessment complete, sedated, propofol at 25mcg and fentanyl pca at [address], withdraws from pain, no gag with suction, VSS, sats 95% with FiO2 at 100%, no secretions with suctioning, veletri gtt continued at 50ng, Aflutter HR 90s, BP 99/56, amio gtt infusing at 0.5mg, tube feedings infusing at 10cc/hr with 20cc residuals, will advance as tolerated, jarvis draining, will continue to monitor. 2150-Peak pressures 50-60s, fentanyl pca increased to 100mcg and PRN dose given. Awaiting stat chest xray. 2200-Propofol maxed at 50mcg, continues to stack breaths, peak pressures remain 50-60s. 2210-Sats dropped to 79%, manually ventilated patient, sats increased to 95%, no difficulty or resistance, respiratory at bedside, ventilator changed, filter changed, peak pressures now 35.   2240-Paged on-call intensivist, Dr. Suzanne Britton. 2250-Spoke with Dr. Suzanne Britton, discussed patient condition, vent settings, c-xray results, sats currently 85%, BP 73/57, low urine output, last hour no urine output, new orders received for levophed gtt, and 2mg versed if needed to help with ventilation, will continue to monitor. 0000-Reassessment complete, sats now 91%, propofol infusing at 50mcg, fentanyl at 100mcg, no longer stacking breaths, peak pressures 35, levophed gtt infusing, weening as tolerated. 0400-Reassessment complete no changes. 0700-Bedside report given to [address]. Levophed infusing at 2mcg, attempted to stop gtt and pressure dropped to 80s.

## 2018-01-07 NOTE — CONSULTS
[address]    Raj Dong  MR#: [id number]  : 1960  ACCOUNT #: [de-identified]   DATE OF SERVICE: 2018    REFERRING PHYSICIAN:  Dr. Cailin Wisdom:  Further evaluation and management of acute renal failure. HISTORY OF PRESENT ILLNESS:  Patient was seen and examined today. History is obtained from chart review. The patient is intubated, sedated, unable to provide any history. No family member at the bedside. HISTORY OF PRESENT ILLNESS:  The patient is a 53-year-old -American male with  significant for, severe COPD, CHF with an EF of 25%, hypertension, diabetes, aortic valve replacement, CAD and breathing difficulties for almost one year for which he was currently waiting for biopsy results. He was admitted originally on 2018 from home with a complaint of shortness of breath which was progressively getting worse. Hospital course has been notable for acute hypoxic and hypercarbic respiratory failure, evolving ARDS, shock state, chronic whiteout left lung, thrombocytopenia, atrial fibrillation with rapid ventricular response, now rate controlled, severe unresolved and now worsening atypical bilateral pneumonia on top of chronic left lobar pneumonic process. He has now developed acute kidney injury over the last three days. Creatinine has increased from 1.8 to 2, two days ago, now to 3.8 with a drop in urine output. Potassium is slightly elevated at 5.2. He was started on pressors. He was diuresed a few days in a row. He was started on IV fluids today. The patient has been noncompliant with his overall care based on chart review and family also has not been wanting to escalate care. Based again on chart review, family would also have not preferred for patient to go on dialysis. PAST MEDICAL HISTORY:  As reviewed in HPI. SOCIAL HISTORY:  Not known. PAST SURGICAL HISTORY:  Not known.     ALLERGIES:  NO KNOWN DRUG ALLERGIES. FAMILY HISTORY:  Currently unobtainable. HOME MEDICATION:  List is not known. CURRENT INPATIENT MEDICATIONS:  Reviewed and he is on Pulmicort inhaler, DuoNeb, cefepime 2 grams IV q.24h.,  Pepcid 20 mg IV daily, Diflucan, Lantus, Humalog, Solu-Medrol, Reglan, Flagyl and also getting vancomycin. He is also on half normal saline with 2 amps of bicarbonate at 75 mL per hour. In addition, he is also on epoprostenol, IV fentanyl and propofol. PHYSICAL EXAMINATION:  GENERAL:  Middle-aged male, ill appearing who is intubated, sedated, and not responsive. VITAL SIGNS:  Afebrile, pulse 93, BP 90/55 on the monitor, respirations of 26, saturating 90-92% on the ventilator. HEENT:  Head is atraumatic, normocephalic. Conjunctivae are clear. Sclerae are anicteric. ET tube in place. NECK:  Difficult to assess JVD. No stridor. No cervical lymphadenopathy. CHEST:  Increased AP diameter. LUNGS:  He has decreased breath sounds at bases and bibasilar rales. He had scattered rhonchi. HEART:  Irregular rhythm with a controlled heart rate. No tachycardia present. ABDOMEN:  Soft, nontender, mild protuberance. GENITOURINARY:  Granados catheter in place. He does have some scrotal edema along with thigh edema. EXTREMITIES:  No clubbing or cyanosis. He does have around 1+edema. CNS:  He is sedated. He does withdraw to pain. PSYCHIATRIC:  He had previous agitation based on chart review. LABORATORY DATA:  , creatinine 3.8, potassium 5.2 pO2 is elevated at 35,  although improved compared to before at 42. CBC shows elevated white count of 20.7, which has improved since admission. Thrombocytopenia with platelets of 71. Chest x-ray earlier today shows some interval clearing of alveolar edema in the right lateral base with persistent diffuse airspace and interstitial infiltrates, likely reflecting severe pulmonary edema.   FLORIAN 01/02/2018 shows EF of 45-50%, mildly reduced LV systolic function. RV systolic function moderate to markedly reduced. ASSESSMENT:  1. Acute kidney injury. This is most likely acute tubular necrosis in the setting of multiorgan failure with oliguria. 2.  Acute hypoxic and hypercarbic respiratory failure with evolving acute respiratory distress syndrome. 3. Fluid overload with pulmonary edema, scrotal and peripheral edema. He has significant positives on his I's and O's.  4.  Bilateral pneumonia, chronic left lobar pneumonic process. 5.  Atrial fibrillation/flutter with rapid ventricular rate. Heart rate controlled at present. 3.  Thrombocytopenia. Heparin induced thrombocytopenia panel is pending. 4.  Cardiomyopathy with a slight better ejection fraction on transesophageal echo. 5.  Severe chronic obstructive pulmonary disease on home O2 prior to admission. The patient most likely has acute tubular necrosis in the setting of hemodynamic instability with multiorgan failure. He is critically ill. He is supported with maximal intensive therapy including vent support, pressors and antibiotics. He is oliguric. He has worsening azotemia and hyperkalemia. Ideally, he needs to start dialysis within the next 12-24 hours, but he seems to be a poor candidate. Based on review of records, it looks like family does not want to be aggressive, have made him DNR, and also does not want to escalate care. RECOMMENDATIONS:    1. Continue current therapy with vent support, antibiotics. 2. Titrate the Levophed to keep MAP greater than 65 mmHg. 3. Monitor I's and O's, avoid nephrotoxins. 4. Rate and rhythm control as you are.    5. We will discuss with the family about options of renal replacement therapy. If they opt to aggressive care, he will need CVVH. 6. I have discontinued the bicarbonate drip because the patient already has a high CO2 which is, again, partial compensation from his chronic respiratory acidosis.     7.  At the next daily labs, renally adjust his medications where indicated. Prognosis:  Guarded/poor. Discussed with ICU nurse and Dr. Debra Loo. I will follow the patient with you.       Kaur Rubin MD       BP / DN  D: 01/07/2018 15:16     T: 01/07/2018 16:12  JOB #: [id number]

## 2018-01-07 NOTE — CONSULTS
[id number]- pt seen. Consult dictated    A:  DEISI- ATN in the setting of shock/resp failure/hemodynamic instability  A on C hypoxic/hypercarbic resp failure  CMP (EF of 40% on FLORIAN)/Afib flutter with RVR  Fluid overload  Hyperkalemia  Azotemia  Severe COPD on home O2  Acute b/l PNA on top of chronic L basilar dz/white out lung on L    Poor prog. Is oliguric.  Needs dialysis in next 12-24 hrs, preferably CVVH  But based on chart review, pts family not wanting to escalate care, made him DNR and may not even want dialysis    PLAN:  Ct supp care- vent, ABx, pressors  Will d/w them, about RRT    Verena Kehr, MD  [address]

## 2018-01-07 NOTE — PROGRESS NOTES
1/6/2018 7:54 PM    Admit Date: 1/1/2018    Admit Diagnosis:   Acute encephalopathy    Subjective:     [facility] remains intubated and sedated. Current Facility-Administered Medications   Medication Dose Route Frequency    Vancomycin Dosing Per levels   Other DAILY    [START ON 1/7/2018] Vancomycin level due 1/7 @ 0400-PLEASE COLLECT  1 Each Other ONCE    [START ON 1/7/2018] cefepime (MAXIPIME) 2 g in 0.9 %  mL IVPB  2 g IntraVENous Q24H    [START ON 1/7/2018] famotidine (PF) (PEPCID) 20 mg in sodium chloride 0.9 % 10 mL injection  20 mg IntraVENous Q24H    [START ON 1/7/2018] fluconazole (DIFLUCAN) 200mg/100 mL IVPB (premix)  200 mg IntraVENous DAILY    methylPREDNISolone (PF) (SOLU-MEDROL) injection 40 mg  40 mg IntraVENous Q6H    apixaban (ELIQUIS) tablet 2.5 mg  2.5 mg Per G Tube Q12H    metoclopramide HCl (REGLAN) injection 5 mg  5 mg IntraVENous Q6H    albuterol-ipratropium (DUO-NEB) 2.5 MG-0.5 MG/3 ML  3 mL Nebulization Q6H RT    metoprolol (LOPRESSOR) injection 1.25 mg  1.25 mg IntraVENous Q4H    fentaNYL (PF) 900 mcg/30 ml infusion soln  0-200 mcg/hr IntraVENous TITRATE    zinc oxide-cod liver oil (DESITIN) 40 % paste   Topical BID    epoprostenol (VELETRI) 30,000 ng/mL in 0.9% sodium chloride 50 mL inhalation solution  50 ng/kg/min (Ideal) Inhalation CONTINUOUS    0.9% sodium chloride inhalation  22.4 mL/hr Inhalation CONTINUOUS    amiodarone (CORDARONE) 900 mg/250 ml D5W infusion  0.5 mg/min IntraVENous TITRATE    sodium chloride (NS) flush 10-30 mL  10-30 mL InterCATHeter PRN    sodium chloride (NS) flush 10-40 mL  10-40 mL InterCATHeter Q8H    sodium chloride (NS) flush 20 mL  20 mL InterCATHeter Q24H    heparin (porcine) pf 300 Units  300 Units InterCATHeter PRN    0.45% sodium chloride 1,000 mL with sodium bicarbonate (4.2%) 278 mEq, folic acid 1 mg, thiamine 100 mg, mvi, adult no. 4 with vit K 10 mL, ascorbic acid (vitamin C) 500 mg infusion   IntraVENous CONTINUOUS    insulin lispro (HUMALOG) injection   SubCUTAneous Q6H    ondansetron (ZOFRAN) injection 4 mg  4 mg IntraVENous Q4H PRN    labetalol (NORMODYNE;TRANDATE) injection 10 mg  10 mg IntraVENous Q2H PRN    albuterol-ipratropium (DUO-NEB) 2.5 MG-0.5 MG/3 ML  3 mL Nebulization Q2H PRN    sodium chloride (NS) flush 5-10 mL  5-10 mL IntraVENous PRN    fentaNYL citrate (PF) injection  mcg   mcg IntraVENous Q2H PRN    chlorhexidine (PERIDEX) 0.12 % mouthwash 15 mL  15 mL Oral Q12H    metroNIDAZOLE (FLAGYL) IVPB premix 500 mg  500 mg IntraVENous Q12H    propofol (DIPRIVAN) infusion  0-50 mcg/kg/min IntraVENous TITRATE    glucose chewable tablet 16 g  4 Tab Oral PRN    dextrose (D50W) injection syrg 12.5-25 g  12.5-25 g IntraVENous PRN    glucagon (GLUCAGEN) injection 1 mg  1 mg IntraMUSCular PRN         Objective:      Physical Exam:    Visit Vitals    /73  Comment: Simultaneous filing. User may not have seen previous data.  Pulse 87    Temp 98.8 °F (37.1 °C)    Resp 30    Ht 5' 7\" (1.702 m)    Wt 212 lb 8.4 oz (96.4 kg)    SpO2 93%    BMI 33.29 kg/m2     Gen: Intubated, sedated  Chest and Lung Exam   Inspection: Accessory muscles - No use of accessory muscles in breathing. Auscultation:   Breath sounds: -Decreased bilaterally, but no overt crackles heard  Cardiovascular   Inspection: Jugular vein - Bilateral - Inspection Normal.   Palpation/Percussion:   Apical Impulse: - Normal.   Auscultation: Rhythm - Regular. Heart Sounds - S1 WNL and S2 WNL. No S3 or S4. Murmurs & Other Heart Sounds: Auscultation of the heart reveals - No Murmurs. Peripheral Vascular   Upper Extremity: Inspection - Bilateral - No Cyanotic nailbeds or Digital clubbing. Lower Extremity:   Palpation: Edema - Bilateral - No edema. Abdomen:   Soft, non-tender, bowel sounds are active.   Neuro: Intubated, sedated ly WNL    Data Review:   Recent Labs      01/05/18   0415   WBC  19.8*   HGB  12.6   HCT 40.8   PLT  57*     Recent Labs      01/06/18   0408  01/05/18   0415  01/04/18   0429   NA  141  135*  139   K  4.1  4.4  4.4   CL  98  93*  100   CO2  34*  37*  34*   GLU  192*  142*  102*   BUN  75*  52*  47*   CREA  2.62*  1.82*  1.30   CA  8.1*  7.4*  8.2*   MG   --   2.1  2.4   PHOS   --   4.0  2.2*   ALB   --   1.2*  1.5*   TBILI   --   0.5  0.4   SGOT   --   92*  56*   ALT   --   44  41   INR  1.2*   --    --        No results for input(s): TROIQ, CPK, CKMB in the last 72 hours. Intake/Output Summary (Last 24 hours) at 01/06/18 1954  Last data filed at 01/06/18 1900   Gross per 24 hour   Intake          3247.16 ml   Output              730 ml   Net          2517.16 ml        Telemetry: AFIB, heart rate 100-110  Cxray:  Diffuse patchy bilateral infiltrates felt to represent ARDS by pulmonary    Assessment:     Principal Problem:    Acute respiratory failure ([facility] (1/1/2018)    Active Problems:    Acute encephalopathy (1/1/2018)      Typical atrial flutter ([address] (1/1/2018)      Acute on chronic systolic HF (heart failure) ([facility] (1/2/2018)      Acute renal insufficiency (1/2/2018)      SOB (shortness of breath) (1/2/2018)      Abdominal distention (1/2/2018)        Plan:     Aflutter with RVR/afib with RVR:  Successful cardioversion for aflutter, but now with PAF at 100-130bpm.  Likely due to his underlying pulmonary issues  Continue on IV amio, decrease to 0.5 today  BP stable, continue low-dose IV metoprolol  Lovenox d/c'd.  [id number] vasc Score: 2. Started on Eliquis for prevention. Watch for increased bleeding with thrombocytopenia     Acute on chronic systolic HF:  Echo shows EF 45-50%  Initially attempting diuresis, but fluids started due to acute renal failure  Continues with significantly positive fluid balance  According to significant other, patient not compliant with medications, diet, f/u appointments  Monitor I/Os, daily weights, labs    Prognosis is severely guarded.   Noted that family said he would not have wanted to escalate his care, maintain the current level care.

## 2018-01-07 NOTE — ROUTINE PROCESS
Bedside and Verbal shift change report received from CASSIE Whipple RN (offgoing nurse). Report included the following information SBAR, Kardex, ED Summary, Procedure Summary, Intake/Output, MAR, Accordion, Recent Results, Med Rec Status and Cardiac Rhythm Atrial Flutter. Back on Levophed at 2mcq/minute. Otherwise, he is maintained with the Mechanical Ventilator, Propofol @ 50mcq/kg/min- FentaNyl's at 200mcq/hour, and the Amiodarone is infusing at 0.5mg/hour. 9379: Skin care completed. Suctioned for a scant amount of chunky tan sputum. He has a poor gag reflex. Propofol decreased to 40mcq/kg/min  1025:FentaNyl decreased to 100mcq/hour. His urine remains sluggish  1150:Consultation called to Dr.B. Marko Guerrero. Otherwise, his assessment is unchanged. 1315. Poor cough with suctioning. Propofol decreased to 30mcq/kg/min  1415:Dr.B. Esquivel at the bedside.  Urinary output has been 100cc's.  1600: at the bedside speaking with Nicole Gtz, his significant other

## 2018-01-07 NOTE — PROGRESS NOTES
01/07/18 0608   ABCDEF Bundle   SBT Safety Screen Passed No   SBT Screen Reason for Failure FiO2 > 50%;PEEP > 7.5     No SBTs

## 2018-01-07 NOTE — PROGRESS NOTES
Hospitalist Progress Note    NAME: Vincenzo Osorio   :  1960   MRN:  [id number]       Assessment / Plan:  Shock and acute on chronic combined hypoxic/hypercarbic respiratory failure due to atypical bilateral PNA/possible ARDS in setting of chronic left lobar pneumonic process: per notes had bronch at the [place] and only E coli treated  - CXR this morning with some interval clearing of alveolar edema in the right lateral base with persistent diffuse airspace and interstitial infiltrates likely reflecting severe pulmonary edema though underlying infectious infiltrate cannot be excluded. - blood cultures  no growth. Sputum  rare yeast.  Repeat sputum  2+ WBC but no growth. - con't emperic cefepime, vancomcyin, diflucan, flagyl  - appreciate pulmonology assistance with vent mgmt  - con't duonebs, pulmicort  - con't solumedrol  - con't veletri, levophed  - fentanyl, proprofol for sedation on ventilator  - noted I/Os +16L since admission  DEISI with hyperkalemia: due to sepsis/shock; very little urine output in the last 24 hours (<100)  - UA requested  - considering nephrology consultation but unlikely that family would consider HD. Will con't to discuss pending clinical course  Atrial fibrillation/flutter and acute on chronic systolic HF: s/p cardioversion 1/3, currently in atrial flutter  - echo  with EF 45-50%. There were no regional wall motion abnormalities.   - con't amiodarone gtt, lopressor  - con't elquis  - appreciate cardiology assistance   Acute thrombocytopenia: multifactorial including sepsis  - con't eliquis  - monitor closely for e/o bleeding   DM2 with steroid induced hyperglycemia:  HgA1c 9.0  - add lantus  - con't lispro sliding scale  Possible RA: reported by wife, has recently been on steroids  Medical noncompliance: wife noted he refused some medications and declined home O2, unclear what he was taking PTA  Obesity (Body mass index is 35.94 kg/(m^2)     Code Status: Partial (wife is medical decision maker)  DVT Prophylaxis: eliquis     Subjective:     Chief Complaint / Reason for Physician Visit  Intubated and sedated, not responsive. Discussed with RN events overnight. Review of Systems:  Symptom Y/N Comments  Symptom Y/N Comments   Fever/Chills    Chest Pain     Poor Appetite    Edema     Cough    Abdominal Pain     Sputum    Joint Pain     SOB/DOBSON    Pruritis/Rash     Nausea/vomit    Tolerating PT/OT     Diarrhea    Tolerating Diet     Constipation    Other       Could NOT obtain due to: Intubated, sedated     Objective:     VITALS:   Last 24hrs VS reviewed since prior progress note.  Most recent are:  Patient Vitals for the past 24 hrs:   Temp Pulse Resp BP SpO2   01/07/18 0900 - 88 - - -   01/07/18 0830 - 88 30 104/64 93 %   01/07/18 0815 - 86 30 115/69 92 %   01/07/18 0808 - 79 30 - 92 %   01/07/18 0803 - - - - 93 %   01/07/18 0800 97.6 °F (36.4 °C) 81 30 109/61 93 %   01/07/18 0745 - 76 30 116/60 93 %   01/07/18 0730 - 81 30 120/60 93 %   01/07/18 0715 - 84 30 117/60 94 %   01/07/18 0700 - 78 30 117/62 94 %   01/07/18 0645 - 77 30 (!) 85/48 92 %   01/07/18 0630 - 69 30 (!) 86/50 92 %   01/07/18 0600 - 74 30 95/55 93 %   01/07/18 0515 - 79 30 93/52 92 %   01/07/18 0500 - 82 30 101/53 92 %   01/07/18 0445 - 82 30 (!) 87/54 91 %   01/07/18 0425 - 84 30 - 92 %   01/07/18 0400 97.9 °F (36.6 °C) 88 30 100/62 93 %   01/07/18 0330 - 90 30 111/62 93 %   01/07/18 0300 - 95 30 102/60 93 %   01/07/18 0231 - - - - 92 %   01/07/18 0214 - 86 30 - 92 %   01/07/18 0200 - 86 30 110/60 91 %   01/07/18 0139 - 83 30 - 91 %   01/07/18 0130 - 86 30 99/59 90 %   01/07/18 0100 - 87 30 109/59 91 %   01/07/18 0030 - 92 28 121/56 91 %   01/07/18 0007 - 92 30 118/71 91 %   01/07/18 0001 98 °F (36.7 °C) 90 30 121/75 91 %   01/06/18 2300 - 91 30 (!) 76/51 (!) 86 %   01/06/18 2245 - 96 30 (!) 73/53 (!) 82 %   01/06/18 2229 - (!) 102 30 95/55 92 %   01/06/18 2200 - 98 30 90/63 92 %   01/06/18 2133 - 90 30 - 96 %   01/06/18 2128 - - - - 96 %   01/06/18 2100 - 90 30 102/61 96 %   01/06/18 2020 - 90 30 100/57 95 %   01/06/18 2000 97.4 °F (36.3 °C) 98 30 99/56 95 %   01/06/18 1900 - 87 30 101/73 93 %   01/06/18 1800 - 96 30 95/55 91 %   01/06/18 1714 - (!) 105 30 - 92 %   01/06/18 1700 - (!) 102 27 100/73 92 %   01/06/18 1600 - (!) 105 30 95/64 (!) 88 %   01/06/18 1541 98.8 °F (37.1 °C) (!) 105 30 97/67 (!) 89 %   01/06/18 1500 - (!) 115 30 (!) 84/63 94 %   01/06/18 1415 - - - - 96 %   01/06/18 1400 - (!) 104 30 100/72 93 %   01/06/18 1347 - (!) 104 29 - 94 %   01/06/18 1300 - (!) 109 30 94/74 96 %   01/06/18 1234 - (!) 110 26 100/69 96 %   01/06/18 1230 - (!) 104 30 (!) 84/62 96 %   01/06/18 1204 - (!) 116 30 95/69 -   01/06/18 1200 - (!) 104 30 (!) 84/59 94 %   01/06/18 1130 98.7 °F (37.1 °C) (!) 110 30 90/58 95 %   01/06/18 1100 - (!) 110 30 100/58 94 %   01/06/18 1030 - (!) 106 30 92/50 94 %   01/06/18 1000 - (!) 112 30 102/64 95 %   01/06/18 0955 - - - - 93 %   01/06/18 0930 - 87 30 102/52 92 %   01/06/18 0929 - - - - 92 %       Intake/Output Summary (Last 24 hours) at 01/07/18 0924  Last data filed at 01/07/18 0700   Gross per 24 hour   Intake          3100.73 ml   Output               98 ml   Net          3002.73 ml        PHYSICAL EXAM:  General: WD, WN. Not alert, cooperative, no acute distress    EENT:  EOM not intact. Anicteric sclerae. MMM with ET tube in place  Resp:  No wheezing. No accessory muscle use. Fair air movement. CV:  Regular  rhythm,  No edema  GI:  Soft, mildly distended, Non tender.  +Bowel sounds  Neurologic:  Not alert and oriented X 0, no speech,   Psych:   No insight. Not anxious nor agitated  Skin:  No rashes.   No jaundice    Reviewed most current lab test results and cultures  YES  Reviewed most current radiology test results   YES  Review and summation of old records today    NO  Reviewed patient's current orders and MAR    YES  PMH/SH reviewed - no change compared to H&P  ________________________________________________________________________  Care Plan discussed with:    Comments   Patient x    Family      RN x    Care Manager     Consultant  x Pulmonology, cardiology                     Multidiciplinary team rounds were held today with , nursing, pharmacist and clinical coordinator. Patient's plan of care was discussed; medications were reviewed and discharge planning was addressed. ________________________________________________________________________  Total NON critical care TIME:  45 Minutes    Total CRITICAL CARE TIME Spent:   Minutes non procedure based      Comments   >50% of visit spent in counseling and coordination of care x    ________________________________________________________________________  Elda Velasquez MD     Procedures: see electronic medical records for all procedures/Xrays and details which were not copied into this note but were reviewed prior to creation of Plan. LABS:  I reviewed today's most current labs and imaging studies.   Pertinent labs include:  Recent Labs      01/07/18 0445 01/05/18 0415   WBC  20.7*  19.8*   HGB  12.5  12.6   HCT  39.6  40.8   PLT  71*  57*     Recent Labs      01/07/18 0445 01/06/18 0408  01/05/18 0415   NA  136  141  135*   K  5.2*  4.1  4.4   CL  95*  98  93*   CO2  35*  34*  37*   GLU  251*  192*  142*   BUN  105*  75*  52*   CREA  3.80*  2.62*  1.82*   CA  7.8*  8.1*  7.4*   MG  2.4   --   2.1   PHOS  7.1*   --   4.0   ALB  1.2*   --   1.2*   TBILI  0.4   --   0.5   SGOT  71*   --   92*   ALT  34   --   44   INR   --   1.2*   --        Signed: Elda Velasquez MD

## 2018-01-07 NOTE — PROGRESS NOTES
Follow up visit with patient's partner as partner had just learned that patient is going to need dialysis and partner feels patient will never go to receive dialysis. Partner shared she and patient's best friend share medical power of  decisions. I offered supportive listening to life history of patient and the 17 years patient his partner have spent together. Vanesa has not been an important coping mechanism for patient, but partner shared patient has begun to watch more Restorationist programming on TV and that he is [facility]. Partner has no concerns spiritually. We discussed decision making process and I attempted to affirm ways partner could give herself johanna in whatever decisions are made, reflecting that patient has heretofore made many decisions concerning his own health that are now having an effect. Prayer for wisdom and discernment for partner and best friend as they make decision and for peace that passes understanding for partner. Partner has met Phillip Dance previously and is appreciative of support. Pastoral care will continue to follow and provide emotional and spiritual support to family. Pt has a daughter whom he is estranged from at this time. [phone]. Visit by: Blaine Gonzalez. Rogelio Dias.  Lorie Lincoln MA, [facility]    Lead  Profession Development & Advancement

## 2018-01-07 NOTE — PROGRESS NOTES
Sim Veliz [facility] Patient Transfer Team, [place]: [phone]) contacted CM to discuss possible patient transfer request pending pt is medically stable for transport. CM discussed with Attending, who stated pt is not stable for transport at this time due to his condition. CM to update [facility] Patient Transfer Team.     2:00PM  CM contacted [facility] Patient Transfer Team to provide updates. Transfer team requesting updates this week once pt's condition stabilizes for transfer.      JAYME Antonio Supervisee in Social Work, [employer]  [phone]

## 2018-01-07 NOTE — PROGRESS NOTES
Pharmacy Automatic Renal Dosing Protocol - Antimicrobials/Vancomycin     Indication for Antimicrobials: Recurrent HAP (recent [facility] hospitalization)     Current Regimen of Each Antimicrobial:  Cefepime 2 gm IV every 24 hours (Started 18; Day #7)  Fluconazole 200 mg IV every 24 hours (Started 18; Day #7)  Metronidazole 500 mg IV every 12 hours (Started 18; Day #7)  Vancomycin dosed by pharmacy (Started 18; Day #7)     Previous Antimicrobial exposure during current admission:  Levofloxacin 750 mg IV every 48 hours (Started 18; LOT: 4 days)     Vancomycin Goal Trough: 15-20 mcg/mL     Measured / Extrapolated Vancomycin Level:   Vancomycin RANDOM Level (1/3/18 at 0401) = 7.5 mcg/mL     Microbiology Results (Current encounter)   Date/Time Order Name Specimen Source Lab Status   18 1257 CULTURE, FUNGUS Bronchial Aspirate In process   18 1257 KEERTHI, OTHER SOURCES Bronchial Aspirate Final result   18 1257 CULTURE, RESPIRATORY/SPUTUM/BRONCH W GRAM STAIN Bronchial Aspirate Final result   18 0948 CULTURE, RESPIRATORY/SPUTUM/BRONCH W GRAM STAIN Tracheal Aspirate Final result   18 0025 CULTURE, BLOOD Blood Final result     Estimated Creatinine Clearance: 24.4 mL/min (based on Cr of 3.8).   Estimated Creatinine Clearance (using IBW):19.8 mL/min    Recent Labs      18   0445  18   0408  18   0415   CREA  3.80*  2.62*  1.82*   BUN  105*  75*  52*   WBC  20.7*   --   19.8*   NA  136  141  135*   K  5.2*  4.1  4.4   MG  2.4   --   2.1   CA  7.8*  8.1*  7.4*   PHOS  7.1*   --   4.0   ALB  1.2*   --   1.2*   HGB  12.5   --   12.6   HCT  39.6   --   40.8   PLT  71*   --   57*   INR   --   1.2*   --    ALT  34   --   44     Temp (24hrs), Av.1 ° F (36.7 ° C), Min:97.4 ° F (36.3 ° C), Max:98.8 ° F (37.1 ° C)    Impression/Plan:   -Leukocytosis improved but persistent (steroids on board), afebrile  -thrombocytopenia  -continued SCr rise with continue abx/antifungal regimen adjustments  -All Cx ng/final except  Broch aspirate/fungus from 1/4 pending  -vent support  -Net I/O 1/5 = 2500mL, 1/6 = 3000mL (with output reduction 1/51=4914xR then 128mL output on 1/7 and no urine output 1/7 22:50 progress note), thus will reorder Vancomycin random level for Tuesday 1/9. DEISI likely from septic shock per Hospitalist note. -CXR 1/5 c/w severe bilateral interstitial airspace opacities have slightly improved - possibly ARDS  -S/p bronchoscopy 1/4 due to mucus plugging;specimen sent for microbiology,fungal cx coming back negative  -jarvis - urine hazy appearance  -1/6 pm no secretions w/ suctioning per progress note  -abdominal distention improved from 1/2 post NGT placement, hypo active BSounds  -Vancomycin 1/7am random level = 28.4  Antibiotic Plan  -Plan to redose Vancomycin once level  is projected to be < 20.   -1/5 Rounds we discussed other medication causes of thrombocytopenia however given platelet drop from 1/1 to 1/2 then again 1/3, medication causes are longer onset. Thus sepsis suspected cause of thrombocytopenia. Little platelet rise from 1/5 to 1/7 noted. Could consider discontinue Cefepime since Cx with ng and today is day #7. Continue Fluconazole and Metronidazole (consider 10 day coarse stop dates with ng on cultures). Pharmacy will follow daily and adjust medications as appropriate for renal function and/or serum levels.     Thank you,  Mary Beth Andrews, [facility]

## 2018-01-07 NOTE — PROGRESS NOTES
Met with pts friend- at bedside. Pt lives with her. She is also mPOA  Discussed pts current condition, progressive ARF, fluid overload and renal prognosis  Did explain he was poor candidate for dialysis due to severe COPD/chronic resp failure, multiple co-morbidities and dialysis would not alter meaningful QOL    She admitted that pt himself did not take good care of himself. Never checked blood glucose, did not want oxygen and several other medical care required.    But she would like to discuss with pts other family member, who is also mPOA and make combined decision    PLAN:  Will ct current care  Discussed with pts family/friend in am to decide on goals of care    Ruslan Caban MD  [facility] [employer]

## 2018-01-08 NOTE — PROGRESS NOTES
This is a snapshot of the patient's [facility] medical record. For complete information or questions call [phone] or In [facility] ([id number]).  [facility] Number:  [id number]       cdmp query:septic shock due to pna

## 2018-01-08 NOTE — PROGRESS NOTES
Pharmacy Automatic Renal Dosing Protocol - Antimicrobials    Indication for Antimicrobials: Recurrent HAP    Current Regimen of Each Antimicrobial:  Cefepime 2 gm IV every 24 hours (Started 18; Day #8 of 10)  Fluconazole 400 mg IV every 24 hours (Started 18; Day #8 of 10)  Metronidazole 500 mg IV every 12 hours (Started 18; Day #8 of 10)    Previous Antimicrobials:  Vancomycin dosed by pharmacy (Started 18; Stopped 18)    Significant Cultures:   18 Respiratory culture = No growth (FINAL)  18 Fungal respiratory culture = Results pending  18 Respiratory culture = Rare yeast (FINAL)  18 Blood culture = No growth (FINAL)    Labs:  Recent Labs      18   0452  18   0445  18   0408   CREA  4.76*  3.80*  2.62*   BUN  124*  105*  75*   WBC  26.0*  20.7*   --      Temp (24hrs), Av.7 °F (37.1 °C), Min:97.8 °F (36.6 °C), Max:99.4 °F (37.4 °C)    Creatinine Clearance (mL/min) or Dialysis: CVVHD    No results found for: PCT    Impression/Plan:   - Vancomycin discontinued on 18 rounds. - CVVHD to start this evening.  - Cefepime dose adjusted to 2 gm IV every 12 hours for CVVHD. 10 day stop date placed on 18 CCU rounds. - Fluconazole dosed appropriately based on indication and renal function. Continue current regimen. 10 day stop date placed on 18 CCU rounds. - Metronidazole dosed appropriately based on indication. Continue current regimen. 10 day stop date placed on 18 CCU rounds. Pharmacy will follow daily and adjust medications as appropriate for renal function and/or serum levels.     Thank you,  Jose Palafox, [facility]

## 2018-01-08 NOTE — PROGRESS NOTES
Follow up visit with life partner,Clary, and best friend, Antonia Hills, (& met friend, Alexey Saeed). Ross Button shared her struggle with decision for dialysis -- that she just wanted to make sure and give him every chance. Ross Button still has concerns if that was the right decision, but is doing the best she can. Provided listening presence as she continues to process the decision and pt's ongoing health issues. Clary and Antonia Hills shared other stories about pt, especially as a . Provided assurance of continued pastoral support.  will follow as able/needed. For Spiritual Care paging please call [phone]). Mohini Canchola M.Div.   Palliative  Fellow

## 2018-01-08 NOTE — PROGRESS NOTES
Nutrition Assessment:    RECOMMENDATIONS:   Initiate TF via NGT:   Start Nepro @ 10mL/h, advance rate 10mL q 8h as tolerated to Goal Rate of 40mL/h + Prosource 1 packet BID + 50mL H2O flush q 6h (provides 1848kcals/108gPro/898mL)     Add bowel regimen (last BM 12/31)    ASSESSMENT:   Chart reviewed, case discussed during CCU rounds. Pt intubated and sedated on propofol @ 15. 2mL/h which provides 401 kcals. Levo at 2.  TF started on Friday but were canceled over the weekend, unsure of reason why. Highest residual recorded was only 100mL. Plans to start CVVH today. Noted pt is up 18.8L since admission and per wt's he has gained 19kg over the past week. K+ 5.6 and phos 7.8. Will start Nepro for now as it is volume restricted and monitor need to switch over to standard formula if electrolytes normalize or become low. No BM noted since 12/31, miralax added today. TF will meet 102% kcal and 100% protein needs. Dietitians Intervention(s)/Plan(s): Initiate TF, monitor lytes and fluid balance, bowel regimen   SUBJECTIVE/OBJECTIVE:   Pt intubated and sedated   Diet Order: NPO  % Eaten:  No data found. to suction at   flush with       via NG Tube   Residuals: 50 mL    Pertinent Medications:cefepime, pepcid, dilfucan, lantus, humalog, flagyl, reglan, solumedrol, miralax; IVF(NaHCO3 + [email or web address]); Drips: levo, propofol, fentanyl.     Chemistries:  Lab Results   Component Value Date/Time    Sodium 139 01/08/2018 04:52 AM    Potassium 5.6 01/08/2018 04:52 AM    Chloride 95 01/08/2018 04:52 AM    CO2 34 01/08/2018 04:52 AM    Anion gap 10 01/08/2018 04:52 AM    Glucose 189 01/08/2018 04:52 AM     01/08/2018 04:52 AM    Creatinine 4.76 01/08/2018 04:52 AM    BUN/Creatinine ratio 26 01/08/2018 04:52 AM    GFR est AA 15 01/08/2018 04:52 AM    GFR est non-AA 13 01/08/2018 04:52 AM    Calcium 7.7 01/08/2018 04:52 AM    Albumin 1.2 01/08/2018 04:52 AM      Anthropometrics: Height: 5' 7\" (170.2 cm) Weight: 108.1 kg (238 lb 5.1 oz)   [x]bed scale(1/8)   []stated   []unknown     IBW (%IBW): 67.3 kg (148 lb 5.9 oz) ( ) UBW (%UBW):   (  %)    BMI: Body mass index is 37.33 kg/(m^2). This BMI is indicative of:  []Underweight   []Normal   []Overweight   [x] Obesity   [] Extreme Obesity (BMI>40)  Estimated Nutrition Needs (Based on): 2213 Kcals/day (PSU (MSJ 1860)) , 100 g (1.5gPro/kg IBW) Protein  Carbohydrate: At Least 130 g/day  Fluids: 1200 mL/day or per MD     Last BM: 12/31   []Active     []Hyperactive  [x]Hypoactive       [] Absent   BS  Skin:    [] Intact   [] Incision  [] Breakdown   [] DTI   [x] Tears/Excoriation/Abrasion  [x]Edema(anasarca; +3-BUE; +1-BLE) [] Other: Wt Readings from Last 30 Encounters:   01/08/18 108.1 kg (238 lb 5.1 oz)      NUTRITION DIAGNOSES:   Problem:  Inadequate protein-energy intake      Etiology: related to pt NPO 2' vent      Signs/Symptoms: as evidenced by NPO + propofol meets <25% kcal and 0% protein needs. Previous dx re: inadequate protein energy intake continues, TF was started Friday but canceled over the weekend. NUTRITION INTERVENTIONS:    Enteral/Parenteral Nutrition: Initiate enteral nutrition                GOAL:   Pt will tolerate TF initiation with residuals <250mL and K, Phos WNL in 2-4 days.      NUTRITION MONITORING AND EVALUATION   Previous Goal: Pt will be started on nutrition support in 2-4 days   Previous Goal Met: Yes   Previous Recommendations Implemented: Yes   Cultural, Jehovah's witness, or Ethnic Dietary Needs: None   LEARNING NEEDS (Diet, Food/Nutrient-Drug Interaction):    [x] None Identified   [] Identified and Education Provided/Documented   [] Identified and Pt declined/was not appropriate      [x] Interdisciplinary Care Plan Reviewed/Documented    [x] Participated in Discharge Planning: Unable to determine    [x] Interdisciplinary Rounds     NUTRITION RISK:    [x] High              [] Moderate           []  Low  []  Minimal/Uncompromised      Nica Eli Jacinto Graves, [address], [address] [place] [address] Dr  Pager [phone]  Weekend Pager [phone]

## 2018-01-08 NOTE — PROGRESS NOTES
0715 Report received from [facility].    0800 Assessment completed. Patient currently intubated on ventilator for airway support. Patient withdrawals in extremities from painful stimuli, no spontaneous movement noted at present. Levophed IV infusing at 2mcg/min and being titrated to keep SBP>90, Amiodarone IV infusing at 0.5mg/min, Veletri IV infusing at 50ng/kg/min per order, Propofol IV infusing at 30mcg/kg/min and being titrated to maintain a RASS of 0 to -1. Fentanyl IV infusing at 100mcg/hr and being titrated to maintain a RASS of 0 to -1. Bilateral wrist restraints discontinued on previous shift due to patient not spontaneously moving. Patient maintains oxygen saturation at 90%. Granados catheter intact draining yellow cloudy urine with minimal output noted. Will closely monitor. 0830 Dr. Catrachita Aguilera in to see and assess patient. 0900 Dr. Alyce Mcclendon in to see and assess patient. 1158 Veletri IV titrated down to 25ng/kg/min per Dr. Laz Mantilla order. 1200 No change from previous assessment. Family at bedside. PTT lab work drawn prior to initiating Heparin IV drip. 1300 IR in room for scheduled Dialysis cathter insertion per order. Dr. Redd Whitman in room to insert [facility] Dialysis Catheter. 1410 Dialysis, RN in room for CVVHD. 1430 Patient oxygen saturation in the 80s, Dr. Alyce Mcclendon notified and new orders received to increase Veletri IV back to 50ng/kg/min. 1447 CVVHD started per Dr. Jerrica Gates order by China Bourgeois, NIMISHA Chirinos 1154 Dialysis Nurse. 1527 Heparin IV started at 9.3 Units/kg/hr per order, rate and dose verified with Vamshi Pelletier, Pharmacist.     1600 Dr. Alyce Mcclendon notified of patient's oxygen level remaining at 81%, Dr. Alyce Mcclendon in to assess patient and changes made to ventilator per Dr. Alyce Mcclendon. 1610 Assessment unchanged. Patient minimally responds to stimuli in all extremities.  Oxygen saturation level 85%, per Dr. Alyce Mcclendon there are no other changes that can be made at this time. No signs of acute pain noted. Patient tolerating CVVHD well at present. Levophed IV increased due to BP trending downward. Will continue to monitor. 1900 Nepro tube feedings started per order at 10ml/hr. 1905 Report given to Sanjuanita Adame, [facility].

## 2018-01-08 NOTE — PROGRESS NOTES
Renal (addendum)    [facility]  [facility] Lucrecia Cerna. We discussed that his kidneys are doing poorly. This is not a primary kidney problem. His kidney failure is secondary to his multiorgan system failure. We discussed that dialysis will not cure his DEISI or his other underlying health issues, including his lung disease or heart disease. We discussed that he might not even tolerate CRRT. We discussed that we may not be able to accomplish volume removal with CRRT. They were both definitive in the decision that they want to try RRT and give him some more time to see if he can recover. Plan: will ask IR to place a viviane and will initiate CRRT.      Duong Gonzalez

## 2018-01-08 NOTE — DIALYSIS
[address]       [phone]    Orders   Mode: CVVHD   Factor: 0   UFR: 2000 ml/hr   Blood Flow Rate: 200 ml/min     Metrics   BP / HR: 108/62   79   Blood Flow Rate: 200 ml/min   AP:                         -112   RP: 61   TMP: 22   PD: 38   FP: 150   UFR: 2000 ml/hr     Comments / Plan:      HF 1000 filter set up for new start, set primed, tested, running well. Labs, consents, orders, and xray reviewed. Right IJ temporary CVC dressing with biopatch C/D/I and dated for 01/08/18, + flush + aspiration x both ports, site prepped per policy. All lines and connections secure and visible, blood warmer to return line set for 37.0 degrees C. Pre/post report given to Kar Shepherd RN.

## 2018-01-08 NOTE — PROGRESS NOTES
1900-Bedside report received from [place], [facility].   2000-Shift assessment complete, sedated, withdraws from pain, pupils equal and sluggish, bilateral wrist restraints continued for safety, propofol gtt continued at 30mcg and fentanyl pca at 100mcg, weak gag with suction, no  secretions with suctioning, coarse breath sounds, sats 92% with FiO2 at 100% peak pressures 35, veletri infusing, sats drop with turning, patient placed on continuous lateral rotation therapy, Aflutter, VSS, amio gtt continued at 0.5mg, levophed gtt continued at 2mcg to maintain BP, will ween as tolerated, OGT clamped, hypoactive bowel sounds, jarvis draining minimal output, sample sent to lab, 3+ pitting edema, afebrile, family at bedside, will continue to monitor. 0000-Reassessment complete skin breakdown noted on patients sacrum, sats decreased with long recovery time, now 90%, placed back on CLRT.   0400-Reassessment complete, no changes. 0700-Bedside report given to Олег Almazan [facility].

## 2018-01-08 NOTE — PROGRESS NOTES
Hospitalist Progress Note    NAME: Evangelista Rene   :  1960   MRN:  [id number]       Assessment / Plan:  Shock and acute on chronic combined hypoxic/hypercarbic respiratory failure due to atypical bilateral PNA/possible ARDS in setting of chronic left lobar pneumonic process: per notes had bronch at the [place] and only E coli treated  - CXR this morning with pulmonary edema pattern. No significant change. - blood cultures  no growth. Sputum  rare yeast.  Repeat sputum  2+ WBC but no growth. - con't emperic cefepime, diflucan, flagyl  - appreciate pulmonology assistance with vent mgmt  - con't duonebs, pulmicort  - con't solumedrol  - con't veletri, levophed  - fentanyl, proprofol for sedation on ventilator  - noted I/Os +16L since admission  DEISI with hyperkalemia: due to sepsis/shock; very little urine output in the last 24 hours (<100). Worsening acidosis today. - UA without bacteria. +small protein, LE, WBC and RBCs  - appreciate nephrology consult, decision makers have decided on trial of HD today. Jose catheter being placed for HD. Atrial fibrillation/flutter and acute on chronic systolic HF: s/p cardioversion 1/3, currently in atrial flutter. LVEF 37% on recent stress test at [facility]. - echo  with EF 45-50%. There were no regional wall motion abnormalities.   - con't amiodarone gtt, lopressor, eliquis  - appreciate cardiology assistance  Acute thrombocytopenia: multifactorial including sepsis, remains low but stable  - con't eliquis  - monitor closely for e/o bleeding   DM2 with steroid-induced hyperglycemia:  HgA1c 9.0  - improved with addition of lantus yesterday  - con't lispro sliding scale  Possible RA: reported by wife, has recently been on steroids  Medical noncompliance: wife noted he refused some medications and declined home O2, unclear what he was taking PTA  Obesity (Body mass index is 37.33 kg/(m^2)      Code Status: Partial (wife is medical decision maker)  DVT Prophylaxis: eliquis     Subjective:     Chief Complaint / Reason for Physician Visit  Intubated, nonresponsive. Discussed with RN events overnight. Review of Systems:  Symptom Y/N Comments  Symptom Y/N Comments   Fever/Chills    Chest Pain     Poor Appetite    Edema     Cough    Abdominal Pain     Sputum    Joint Pain     SOB/DOBSON    Pruritis/Rash     Nausea/vomit    Tolerating PT/OT     Diarrhea    Tolerating Diet     Constipation    Other       Could NOT obtain due to: nonresponsive     Objective:     VITALS:   Last 24hrs VS reviewed since prior progress note.  Most recent are:  Patient Vitals for the past 24 hrs:   Temp Pulse Resp BP SpO2   01/08/18 1300 - 83 29 111/62 (!) 88 %   01/08/18 1230 - 81 30 108/67 (!) 89 %   01/08/18 1200 98 °F (36.7 °C) 86 30 108/64 (!) 89 %   01/08/18 1143 - 83 30 - (!) 89 %   01/08/18 1100 - 84 30 102/59 (!) 88 %   01/08/18 1000 - 79 21 119/67 90 %   01/08/18 0900 - 87 30 115/70 91 %   01/08/18 0800 97.8 °F (36.6 °C) 82 30 121/67 90 %   01/08/18 0724 - 72 30 - 90 %   01/08/18 0700 - 80 23 116/63 90 %   01/08/18 0620 - 81 30 - -   01/08/18 0605 - 81 30 - 90 %   01/08/18 0600 - 79 30 113/59 (!) 89 %   01/08/18 0500 - 79 30 120/70 (!) 89 %   01/08/18 0400 97.9 °F (36.6 °C) 85 30 101/60 90 %   01/08/18 0300 - 86 30 104/58 90 %   01/08/18 0232 - 71 30 - 90 %   01/08/18 0229 - - - - 90 %   01/08/18 0200 - 82 30 110/59 90 %   01/08/18 0100 - 75 30 108/58 90 %   01/08/18 0001 99.4 °F (37.4 °C) 78 30 109/57 (!) 89 %   01/07/18 2300 - 82 30 104/59 (!) 88 %   01/07/18 2200 - 84 30 115/64 93 %   01/07/18 2111 - 74 30 - 93 %   01/07/18 2108 - - - - 93 %   01/07/18 2100 - 72 30 116/67 93 %   01/07/18 2000 98.4 °F (36.9 °C) 80 30 115/67 92 %   01/07/18 1900 - 86 30 116/69 93 %   01/07/18 1830 - 82 30 116/67 92 %   01/07/18 1800 - 83 30 116/66 91 %   01/07/18 1730 - 87 30 102/62 91 %   01/07/18 1700 - 93 30 115/65 91 %   01/07/18 1630 - 96 30 111/58 90 %   01/07/18 1616 - 93 30 - 90 % 01/07/18 1600 99.1 °F (37.3 °C) 100 30 111/72 90 %   01/07/18 1530 - 94 30 109/53 (!) 89 %   01/07/18 1500 - 93 30 111/59 90 %   01/07/18 1430 - 98 30 103/62 (!) 89 %   01/07/18 1419 - 99 30 111/65 (!) 88 %       Intake/Output Summary (Last 24 hours) at 01/08/18 1414  Last data filed at 01/08/18 1300   Gross per 24 hour   Intake           3692.8 ml   Output              397 ml   Net           3295.8 ml        PHYSICAL EXAM:  General: WD, WN. Not alert, cooperative, no acute distress    EENT:  EOMI. Anicteric sclerae. ET tube in place. R neck viviane in place. Resp:  CTA bilaterally, no wheezing or rales. No accessory muscle use  CV:  Regular  rhythm,  No edema  GI:  Soft, mildly distended, Non tender.  +Bowel sounds  Neurologic:  Not alert and oriented X 0, no speech,   Psych:   No insight. Not anxious nor agitated  Skin:  No rashes. No jaundice    Reviewed most current lab test results and cultures  YES  Reviewed most current radiology test results   YES  Review and summation of old records today    NO  Reviewed patient's current orders and MAR    YES  PMH/ reviewed - no change compared to H&P  ________________________________________________________________________  Care Plan discussed with:    Comments   Patient x    Family      RN     Care Manager     Consultant                        Multidiciplinary team rounds were held today with , nursing, pharmacist and clinical coordinator. Patient's plan of care was discussed; medications were reviewed and discharge planning was addressed.      ________________________________________________________________________  Total NON critical care TIME:  25 Minutes    Total CRITICAL CARE TIME Spent:   Minutes non procedure based      Comments   >50% of visit spent in counseling and coordination of care x    ________________________________________________________________________  Enid Palomares MD     Procedures: see electronic medical records for all procedures/Xrays and details which were not copied into this note but were reviewed prior to creation of Plan. LABS:  I reviewed today's most current labs and imaging studies.   Pertinent labs include:  Recent Labs      01/08/18 1143 01/08/18 0452 01/07/18 0445   WBC  23.5*  26.0*  20.7*   HGB  11.6*  13.7  12.5   HCT  36.2*  41.4  39.6   PLT  84*  87*  71*     Recent Labs      01/08/18   1143  01/08/18   0452  01/07/18   0445  01/06/18   0408   NA  137  139  136  141   K  5.7*  5.6*  5.2*  4.1   CL  93*  95*  95*  98   CO2  34*  34*  35*  34*   GLU  243*  189*  251*  192*   BUN  123*  124*  105*  75*   CREA  4.81*  4.76*  3.80*  2.62*   CA  7.4*  7.7*  7.8*  8.1*   MG  2.5*  2.5*  2.4   --    PHOS  7.9*  7.8*  7.1*   --    ALB  1.1*  1.2*  1.2*   --    TBILI   --   0.5  0.4   --    SGOT   --   56*  71*   --    ALT   --   29  34   --    INR   --    --    --   1.2*       Signed: Evy Ahn MD

## 2018-01-08 NOTE — PROGRESS NOTES
Since starting dialysis, patient's SpO2 has dropped to 81-82% despite 100% FiO2, 18 cm H2O of PEEP, and max dose Veletri. Few other maneuvers available. I attempted recruitment maneuvers, but there was no improvement. There is no ventilator dyssynchrony.   Little other room for titration of his Donna Gonzalez MD

## 2018-01-08 NOTE — PROGRESS NOTES
[facility]  Pulmonary, Critical Care, and Sleep Medicine    Name: Robin Wade MRN: [id number]   : 1960 Hospital: [facility]   Date: 2018        IMPRESSION:   · Acute/chronic hypoxic/hypercapnic respiratory failure  · Severe baseline ILD of undetermined etiology  · ARDS  · Acute renal failure  · Shock  · CHF - LVEF 37% on recent stress test at [facility]  · Afib  · DM  · RA  · Noncompliance - he has refused O2 and not taken many of the meds prescribe to him      PLAN:   · Ventilator support - he is on ARDS tidal volumes with pressure control and still unable to reach goal Pplat due to high PEEP and FiO2  · Bronchodilators  · Try to wean off Veletri as it is not clear that it is providing any benefit  · Nephrology evaluation ongoing - to start CRRT today  · Pressors  · Amiodarone per cardiology   · Empiric antibiotics - stop date in place  · IV bicarb - note his baseline serum bicarbonate on admission was 42  · Insulin   · Patient requiring restraints due to threat or injury to self  · DVT/GI prophylaxis  · Palliative care evaluation ongoing. Note I had a louie discussion with family today regarding the patient's poor prognosis. Subjective/Interval History:   I have reviewed the flowsheet and previous days notes. The patient is unable to give any meaningful history or review of systems because the patient is:   Intubated/sedated     The patient is critically ill on:      Mechanical ventilation/pressors     Review of Systems   Unable to perform ROS: Intubated     Objective:   Vital Signs:    Visit Vitals    /59    Pulse 83    Temp 97.8 °F (36.6 °C)    Resp 30    Ht 5' 7\" (1.702 m)    Wt 108.1 kg (238 lb 5.1 oz)    SpO2 (!) 89%    BMI 37.33 kg/m2       O2 Device: Ventilator   O2 Flow Rate (L/min): 15 l/min   Temp (24hrs), Av.7 °F (37.1 °C), Min:97.8 °F (36.6 °C), Max:99.4 °F (37.4 °C)       Intake/Output:   Last shift:      701 -  1900  In: 564.4 [I.V.:564.4]  Out: 60 [Urine:60]  Last 3 shifts: 01/06 1901 - 01/08 0700  In: 4548.4 [I.V.:4353.4]  Out: 335 [Urine:285]    Intake/Output Summary (Last 24 hours) at 01/08/18 1158  Last data filed at 01/08/18 1100   Gross per 24 hour   Intake          3459.61 ml   Output              317 ml   Net          3142.61 ml     Hemodynamics:   PAP:   CO:     Wedge:   CI:     CVP:  CVP (mmHg): 10 mmHg (01/08/18 1100) SVR:       PVR:       Ventilator Settings:  Mode Rate Tidal Volume Pressure FiO2 PEEP   Pressure control   250 ml    100 % 18 cm H20     Peak airway pressure: 39 cm H2O    Minute ventilation: 12.7 l/min       Physical Exam   Constitutional: He appears ill. He is sedated and intubated. HENT:   Head: Normocephalic and atraumatic. Mouth/Throat: No oropharyngeal exudate. Eyes: No scleral icterus. Cardiovascular: Normal rate and regular rhythm. Pulmonary/Chest: He is intubated. He has no wheezes. He has rales. Abdominal: Soft. Bowel sounds are normal. He exhibits no distension. There is no tenderness. Musculoskeletal: He exhibits edema. Skin: Skin is warm and dry.      Data:     Current Facility-Administered Medications   Medication Dose Route Frequency    sodium bicarbonate (8.4%) 100 mEq in 0.45% sodium chloride 1,000 mL infusion   IntraVENous CONTINUOUS    heparin (porcine) pf 500 Units  500 Units InterCATHeter ONCE    lidocaine (XYLOCAINE) 20 mg/mL (2 %) injection 200 mg  10 mL SubCUTAneous ONCE    heparinized saline 2 units/mL infusion 200 Units  200 Units Irrigation ONCE    heparin (porcine) injection 4,000 Units  37 Units/kg IntraVENous ONCE    heparin 25,000 units in D5W 250 ml infusion  9.3-25 Units/kg/hr IntraVENous TITRATE    polyethylene glycol (MIRALAX) packet 17 g  17 g Per NG tube DAILY    insulin glargine (LANTUS) injection 5 Units  5 Units SubCUTAneous DAILY    cefepime (MAXIPIME) 2 g in 0.9 %  mL IVPB  2 g IntraVENous Q24H    famotidine (PF) (PEPCID) 20 mg in sodium chloride 0.9 % 10 mL injection  20 mg IntraVENous Q24H    fluconazole (DIFLUCAN) 200mg/100 mL IVPB (premix)  200 mg IntraVENous DAILY    methylPREDNISolone (PF) (SOLU-MEDROL) injection 40 mg  40 mg IntraVENous Q6H    metoclopramide HCl (REGLAN) injection 5 mg  5 mg IntraVENous Q6H    budesonide (PULMICORT) 500 mcg/2 ml nebulizer suspension  500 mcg Nebulization BID RT    NOREPINephrine (LEVOPHED) 32 mg in 5% dextrose 250 mL infusion  2-30 mcg/min IntraVENous TITRATE    albuterol-ipratropium (DUO-NEB) 2.5 MG-0.5 MG/3 ML  3 mL Nebulization Q6H RT    fentaNYL (PF) 900 mcg/30 ml infusion soln  0-200 mcg/hr IntraVENous TITRATE    zinc oxide-cod liver oil (DESITIN) 40 % paste   Topical BID    epoprostenol (VELETRI) 30,000 ng/mL in 0.9% sodium chloride 50 mL inhalation solution  25 ng/kg/min (Ideal) Inhalation CONTINUOUS    0.9% sodium chloride inhalation  25.7 mL/hr Inhalation CONTINUOUS    amiodarone (CORDARONE) 900 mg/250 ml D5W infusion  0.5 mg/min IntraVENous TITRATE    sodium chloride (NS) flush 10-40 mL  10-40 mL InterCATHeter Q8H    sodium chloride (NS) flush 20 mL  20 mL InterCATHeter Q24H    insulin lispro (HUMALOG) injection   SubCUTAneous Q6H    chlorhexidine (PERIDEX) 0.12 % mouthwash 15 mL  15 mL Oral Q12H    metroNIDAZOLE (FLAGYL) IVPB premix 500 mg  500 mg IntraVENous Q12H    propofol (DIPRIVAN) infusion  0-50 mcg/kg/min IntraVENous TITRATE                Labs:  Recent Labs      01/08/18   0452  01/07/18   0445   WBC  26.0*  20.7*   HGB  13.7  12.5   HCT  41.4  39.6   PLT  87*  71*     Recent Labs      01/08/18   0452  01/07/18   0445  01/06/18   0408   NA  139  136  141   K  5.6*  5.2*  4.1   CL  95*  95*  98   CO2  34*  35*  34*   GLU  189*  251*  192*   BUN  124*  105*  75*   CREA  4.76*  3.80*  2.62*   CA  7.7*  7.8*  8.1*   MG  2.5*  2.4   --    PHOS  7.8*  7.1*   --    ALB  1.2*  1.2*   --    TBILI  0.5  0.4   --    SGOT  56*  71*   --    ALT  29  34   --    INR   -- --   1.2*     Recent Labs      01/07/18   0545  01/06/18   1050   PH  7.34*  7.32*   PCO2  65*  75*   PO2  64*  60*   HCO3  34*  37*   FIO2  100  100     Imaging:  I have personally reviewed the patients radiographs and have reviewed the reports:  No change in diffuse bilateral infiltrates        Total critical care time exclusive of procedures: 40 minutes  David Carlisle MD

## 2018-01-08 NOTE — PROGRESS NOTES
[facility]        :  1960        MRN:  [id number]        Assessment :    Plan:  --DEISI    Hyperkalemia     Acute hypoxic and hypercarbic respiratory failure with evolving acute respiratory distress syndrome. Fluid overload with pulmonary edema, scrotal and peripheral edema. Bilateral pneumonia      Atrial fibrillation/flutter with rapid ventricular rate. Thrombocytopenia. Cardiomyopathy     Severe chronic obstructive pulmonary disease on home O2 prior to admission --Likely ATN; creatinine on the rise (3.8 to 4.8); combined respiratory/met acidosis; oliguric    Will switch to bicarb gtt (chronic CO2 retention; needs higher bicarb to compensate), also might help with hyperkalemia    Considering initiation of CRRT, but I don't know from review of the chart that this would be something Mr. Michele would want. Will discuss with his poa. Subjective:     Chief Complaint:  Unable to obtain. On vent. On a bit of levo. Review of Systems:    Symptom Y/N Comments  Symptom Y/N Comments   Fever/Chills    Chest Pain     Poor Appetite    Edema     Cough    Abdominal Pain     Sputum    Joint Pain     SOB/DOBSON    Pruritis/Rash     Nausea/vomit    Tolerating PT/OT     Diarrhea    Tolerating Diet     Constipation    Other       Could not obtain due to: See above     Objective:     VITALS:   Last 24hrs VS reviewed since prior progress note.  Most recent are:  Visit Vitals    /59    Pulse 81    Temp 97.9 °F (36.6 °C)    Resp 30    Ht 5' 7\" (1.702 m)    Wt 108.1 kg (238 lb 5.1 oz)    SpO2 90%    BMI 37.33 kg/m2       Intake/Output Summary (Last 24 hours) at 18 0715  Last data filed at 18 0600   Gross per 24 hour   Intake          2803.04 ml   Output              257 ml   Net          2546.04 ml      Telemetry Reviewed:     PHYSICAL EXAM:  General: Critically ill in the icu, on the vent  Rhonchi  ++ edema  Soft, nt    Lab Data Reviewed: (see below)    Medications Reviewed: (see below)    PMH/SH reviewed - no change compared to H&P  ________________________________________________________________________  Care Plan discussed with:  Patient     Family      RN y    Care Manager                    Consultant:          Comments   >50% of visit spent in counseling and coordination of care       ________________________________________________________________________  eKvin Simon MD     Procedures: see electronic medical records for all procedures/Xrays and details which  were not copied into this note but were reviewed prior to creation of Plan. LABS:  Recent Labs      01/08/18 0452 01/07/18 0445   WBC  26.0*  20.7*   HGB  13.7  12.5   HCT  41.4  39.6   PLT  87*  71*     Recent Labs      01/08/18 0452 01/07/18 0445 01/06/18 0408   NA  139  136  141   K  5.6*  5.2*  4.1   CL  95*  95*  98   CO2  34*  35*  34*   BUN  124*  105*  75*   CREA  4.76*  3.80*  2.62*   GLU  189*  251*  192*   CA  7.7*  7.8*  8.1*   MG  2.5*  2.4   --    PHOS  7.8*  7.1*   --      Recent Labs      01/08/18 0452 01/07/18 0445   SGOT  56*  71*   AP  56  59   TP  5.0*  4.8*   ALB  1.2*  1.2*   GLOB  3.8  3.6     Recent Labs      01/06/18   0408   INR  1.2*   PTP  12.0*   APTT  57.1*      No results for input(s): FE, TIBC, PSAT, FERR in the last 72 hours. No results found for: FOL, RBCF   Recent Labs      01/07/18   0545  01/06/18   1050   PH  7.34*  7.32*   PCO2  65*  75*   PO2  64*  60*     No results for input(s): CPK, CKMB in the last 72 hours.     No lab exists for component: TROPONINI  No components found for: [place]  Lab Results   Component Value Date/Time    Color YELLOW/STRAW 01/07/2018 07:19 PM    Appearance TURBID 01/07/2018 07:19 PM    Specific gravity 1.021 01/07/2018 07:19 PM    pH (UA) 5.0 01/07/2018 07:19 PM    Protein 30 01/07/2018 07:19 PM    Glucose NEGATIVE  01/07/2018 07:19 PM Ketone NEGATIVE  01/07/2018 07:19 PM    Bilirubin NEGATIVE  01/07/2018 07:19 PM    Urobilinogen 0.2 01/07/2018 07:19 PM    Nitrites NEGATIVE  01/07/2018 07:19 PM    Leukocyte Esterase SMALL 01/07/2018 07:19 PM    Epithelial cells FEW 01/07/2018 07:19 PM    Bacteria NEGATIVE  01/07/2018 07:19 PM    WBC 10-20 01/07/2018 07:19 PM    RBC 20-50 01/07/2018 07:19 PM       MEDICATIONS:  Current Facility-Administered Medications   Medication Dose Route Frequency    insulin glargine (LANTUS) injection 5 Units  5 Units SubCUTAneous DAILY    0.9% sodium chloride infusion  60 mL/hr IntraVENous CONTINUOUS    Vancomycin Dosing Per levels   Other DAILY    cefepime (MAXIPIME) 2 g in 0.9 %  mL IVPB  2 g IntraVENous Q24H    famotidine (PF) (PEPCID) 20 mg in sodium chloride 0.9 % 10 mL injection  20 mg IntraVENous Q24H    fluconazole (DIFLUCAN) 200mg/100 mL IVPB (premix)  200 mg IntraVENous DAILY    methylPREDNISolone (PF) (SOLU-MEDROL) injection 40 mg  40 mg IntraVENous Q6H    metoclopramide HCl (REGLAN) injection 5 mg  5 mg IntraVENous Q6H    budesonide (PULMICORT) 500 mcg/2 ml nebulizer suspension  500 mcg Nebulization BID RT    NOREPINephrine (LEVOPHED) 32 mg in 5% dextrose 250 mL infusion  2-30 mcg/min IntraVENous TITRATE    albuterol-ipratropium (DUO-NEB) 2.5 MG-0.5 MG/3 ML  3 mL Nebulization Q6H RT    fentaNYL (PF) 900 mcg/30 ml infusion soln  0-200 mcg/hr IntraVENous TITRATE    zinc oxide-cod liver oil (DESITIN) 40 % paste   Topical BID    epoprostenol (VELETRI) 30,000 ng/mL in 0.9% sodium chloride 50 mL inhalation solution  50 ng/kg/min (Ideal) Inhalation CONTINUOUS    0.9% sodium chloride inhalation  22.4 mL/hr Inhalation CONTINUOUS    amiodarone (CORDARONE) 900 mg/250 ml D5W infusion  0.5 mg/min IntraVENous TITRATE    sodium chloride (NS) flush 10-30 mL  10-30 mL InterCATHeter PRN    sodium chloride (NS) flush 10-40 mL  10-40 mL InterCATHeter Q8H    sodium chloride (NS) flush 20 mL  20 mL InterCATHeter Q24H    heparin (porcine) pf 300 Units  300 Units InterCATHeter PRN    insulin lispro (HUMALOG) injection   SubCUTAneous Q6H    ondansetron (ZOFRAN) injection 4 mg  4 mg IntraVENous Q4H PRN    labetalol (NORMODYNE;TRANDATE) injection 10 mg  10 mg IntraVENous Q2H PRN    albuterol-ipratropium (DUO-NEB) 2.5 MG-0.5 MG/3 ML  3 mL Nebulization Q2H PRN    sodium chloride (NS) flush 5-10 mL  5-10 mL IntraVENous PRN    fentaNYL citrate (PF) injection  mcg   mcg IntraVENous Q2H PRN    chlorhexidine (PERIDEX) 0.12 % mouthwash 15 mL  15 mL Oral Q12H    metroNIDAZOLE (FLAGYL) IVPB premix 500 mg  500 mg IntraVENous Q12H    propofol (DIPRIVAN) infusion  0-50 mcg/kg/min IntraVENous TITRATE    glucose chewable tablet 16 g  4 Tab Oral PRN    dextrose (D50W) injection syrg 12.5-25 g  12.5-25 g IntraVENous PRN    glucagon (GLUCAGEN) injection 1 mg  1 mg IntraMUSCular PRN

## 2018-01-08 NOTE — PROGRESS NOTES
[address]  [phone]      Cardiology Progress Note      1/8/2018 9:00 AM    Admit Date: 1/1/2018    Admit Diagnosis:   Acute encephalopathy    Subjective:     Lionel Michele remains intubated, sedated. Renal function worse, likely will require HD/CVVH. Requiring low dose Levo. Back in aflutter rate controlled.  Significant other at bedside    Visit Vitals    /67    Pulse 79    Temp 97.8 °F (36.6 °C)    Resp 21    Ht 5' 7\" (1.702 m)    Wt 108.1 kg (238 lb 5.1 oz)    SpO2 90%    BMI 37.33 kg/m2       Current Facility-Administered Medications   Medication Dose Route Frequency    sodium bicarbonate (8.4%) 100 mEq in 0.45% sodium chloride 1,000 mL infusion   IntraVENous CONTINUOUS    heparin (porcine) pf 500 Units  500 Units InterCATHeter ONCE    lidocaine (XYLOCAINE) 20 mg/mL (2 %) injection 200 mg  10 mL SubCUTAneous ONCE    heparinized saline 2 units/mL infusion 200 Units  200 Units Irrigation ONCE    insulin glargine (LANTUS) injection 5 Units  5 Units SubCUTAneous DAILY    Vancomycin Dosing Per levels   Other DAILY    cefepime (MAXIPIME) 2 g in 0.9 %  mL IVPB  2 g IntraVENous Q24H    famotidine (PF) (PEPCID) 20 mg in sodium chloride 0.9 % 10 mL injection  20 mg IntraVENous Q24H    fluconazole (DIFLUCAN) 200mg/100 mL IVPB (premix)  200 mg IntraVENous DAILY    methylPREDNISolone (PF) (SOLU-MEDROL) injection 40 mg  40 mg IntraVENous Q6H    metoclopramide HCl (REGLAN) injection 5 mg  5 mg IntraVENous Q6H    budesonide (PULMICORT) 500 mcg/2 ml nebulizer suspension  500 mcg Nebulization BID RT    NOREPINephrine (LEVOPHED) 32 mg in 5% dextrose 250 mL infusion  2-30 mcg/min IntraVENous TITRATE    albuterol-ipratropium (DUO-NEB) 2.5 MG-0.5 MG/3 ML  3 mL Nebulization Q6H RT    fentaNYL (PF) 900 mcg/30 ml infusion soln  0-200 mcg/hr IntraVENous TITRATE    zinc oxide-cod liver oil (DESITIN) 40 % paste   Topical BID    epoprostenol (VELETRI) 30,000 ng/mL in 0.9% sodium chloride 50 mL inhalation solution  50 ng/kg/min (Ideal) Inhalation CONTINUOUS    0.9% sodium chloride inhalation  22.4 mL/hr Inhalation CONTINUOUS    amiodarone (CORDARONE) 900 mg/250 ml D5W infusion  0.5 mg/min IntraVENous TITRATE    sodium chloride (NS) flush 10-30 mL  10-30 mL InterCATHeter PRN    sodium chloride (NS) flush 10-40 mL  10-40 mL InterCATHeter Q8H    sodium chloride (NS) flush 20 mL  20 mL InterCATHeter Q24H    heparin (porcine) pf 300 Units  300 Units InterCATHeter PRN    insulin lispro (HUMALOG) injection   SubCUTAneous Q6H    ondansetron (ZOFRAN) injection 4 mg  4 mg IntraVENous Q4H PRN    labetalol (NORMODYNE;TRANDATE) injection 10 mg  10 mg IntraVENous Q2H PRN    albuterol-ipratropium (DUO-NEB) 2.5 MG-0.5 MG/3 ML  3 mL Nebulization Q2H PRN    sodium chloride (NS) flush 5-10 mL  5-10 mL IntraVENous PRN    fentaNYL citrate (PF) injection  mcg   mcg IntraVENous Q2H PRN    chlorhexidine (PERIDEX) 0.12 % mouthwash 15 mL  15 mL Oral Q12H    metroNIDAZOLE (FLAGYL) IVPB premix 500 mg  500 mg IntraVENous Q12H    propofol (DIPRIVAN) infusion  0-50 mcg/kg/min IntraVENous TITRATE    glucose chewable tablet 16 g  4 Tab Oral PRN    dextrose (D50W) injection syrg 12.5-25 g  12.5-25 g IntraVENous PRN    glucagon (GLUCAGEN) injection 1 mg  1 mg IntraMUSCular PRN       Objective:      Physical Exam:  General Appearance:  WNWD  male intubated  Chest:   Coarse rhonchi  Cardiovascular:  irr, no murmur.   Abdomen:   Soft, non-tender, bowel sounds are active. NG in place  Extremities: UE edema +2  Skin:  Warm and dry.     Data Review:   Recent Labs      01/08/18 0452 01/07/18 0445   WBC  26.0*  20.7*   HGB  13.7  12.5   HCT  41.4  39.6   PLT  87*  71*     Recent Labs      01/08/18   0452  01/07/18 0445  01/06/18   0408   NA  139  136  141   K  5.6*  5.2*  4.1   CL  95*  95*  98   CO2  34*  35*  34*   GLU  189*  251*  192*   BUN  124* 105*  75*   CREA  4.76*  3.80*  2.62*   CA  7.7*  7.8*  8.1*   MG  2.5*  2.4   --    PHOS  7.8*  7.1*   --    ALB  1.2*  1.2*   --    TBILI  0.5  0.4   --    SGOT  56*  71*   --    ALT  29  34   --    INR   --    --   1.2*       No results for input(s): TROIQ, CPK, CKMB in the last 72 hours. Intake/Output Summary (Last 24 hours) at 01/08/18 1040  Last data filed at 01/08/18 1000   Gross per 24 hour   Intake          3341.31 ml   Output              297 ml   Net          3044.31 ml        Telemetry: aflutter, rate controlled      Assessment:     Principal Problem:    Acute respiratory failure ([address] (1/1/2018)    Active Problems:    Acute encephalopathy (1/1/2018)      Typical atrial flutter ([address] (1/1/2018)      Acute on chronic systolic HF (heart failure) ([address] (1/2/2018)      Acute renal insufficiency (1/2/2018)      SOB (shortness of breath) (1/2/2018)      Abdominal distention (1/2/2018)        Plan:     Aflutter with RVR/afib with RVR:  Successful cardioversion for aflutter last week, back in aflutter but rate controlled.  Likely due to his underlying pulmonary issues  Continue on IV amio 0.5mg, no further BB as he is requiring pressor support  CHADS2 vasc Score: 2. Continue on Eliquis for prevention.  Watch for increased bleeding with thrombocytopenia      Volume overload:  Due to a combination of volume given and progressive acute renal failure. CVP is 18-20 on 1/7/2018  Continues with significantly positive fluid balance (16 L for admission)  Nephrology following, will require HD/CVVH  Monitor I/Os, daily weights, labs    Poor prognosis    [address] Cardiology    1/8/2018         Agree with note as outlined by  NP. I confirm findings in history and physical exam. No additional findings noted. Agree with plan as outlined above.      [address] Kenny Bonilla MD

## 2018-01-09 NOTE — PROGRESS NOTES
1900[facility]   2000-Shift assessment complete, sedated propofol gtt at 35mcg and fentanyl pca at 100mcg, no gag with suction, pupils equal and sluggish, NSR, amio gtt continued at 0.5, coarse breath sounds, sats 87% with FiO2 at 100%, veletri infusing at 50ng, thick pink tinged sputum, levophed infusing at 6mcg to maintain BP, bicarb gtt infusing at 84mL/hr, CVVHD ongoing with factor of 0 will increase as tolerated per order, heparin gtt infusing at 9.3u/kg/hr, tube feedings restarted at 10mL/hr, dusky coloring, BLE cool to touch, jarvis draining, family home for the evening, will continue to monitor. 2130-Dr. Iris Sotelo paged. 2138-Spoke with Dr. Iris Sotelo regarding patient condition, converted to NSR earlier this afternoon and HR now 58, instructed to leave pt on amio gtt unless HR drops below 40, will continue to monitor. 2237-PTT resulted >130, heparin gtt stopped, will recheck PTT in two hours, Dr. Mignon Arteaga notified. 0000-Reassessment complete, patient tolerating CVVHD with factor of 50, sats now 90%, no other changes, will continue to monitor. 0100-Partial bath given, unable to tolerate being turned, bilateral upper extremities weeping. 0113-PTT resulted 59.6, heparin gtt restarted at 6.3u/kg/hr per protocol, next PTT due at 0713.  0130-Dialysis machine alarming extremely negative access pressure, called meli flex 24hr support line instructed to flush and reverse access lines, access pressures now wnl, will continue to monitor. 0400-Reassessment complete, no changes. 0700-Bedside report given to NIMISHA Rodriguez.

## 2018-01-09 NOTE — PROGRESS NOTES
Dawood Michele        :  1960        MRN:  [id number]        Assessment :    Plan:  --DEISI    Hyperkalemia     Acute hypoxic and hypercarbic respiratory failure with evolving acute respiratory distress syndrome. Fluid overload with pulmonary edema, scrotal and peripheral edema. Bilateral pneumonia      Atrial fibrillation/flutter with rapid ventricular rate. Thrombocytopenia. Cardiomyopathy     Severe chronic obstructive pulmonary disease on home O2 prior to admission --ATN; cvvhd initiated on ; tolerating factor 50    continue bicarb gtt - I agree with increased rate (chronic CO2 retention; needs higher bicarb to compensate)           Subjective:     Chief Complaint: Unable to obtain. On vent. On levo gtt. On CVVHD (factor 50)      Review of Systems:    Symptom Y/N Comments  Symptom Y/N Comments   Fever/Chills    Chest Pain     Poor Appetite    Edema     Cough    Abdominal Pain     Sputum    Joint Pain     SOB/DOBSON    Pruritis/Rash     Nausea/vomit    Tolerating PT/OT     Diarrhea    Tolerating Diet     Constipation    Other       Could not obtain due to: See above     Objective:     VITALS:   Last 24hrs VS reviewed since prior progress note.  Most recent are:  Visit Vitals    /60    Pulse 66    Temp 96.4 °F (35.8 °C)    Resp 30    Ht 5' 7\" (1.702 m)    Wt 105.7 kg (233 lb 0.4 oz)    SpO2 92%    BMI 36.5 kg/m2       Intake/Output Summary (Last 24 hours) at 18 0658  Last data filed at 18 0600   Gross per 24 hour   Intake          3272.46 ml   Output             3112 ml   Net           160.46 ml      Telemetry Reviewed:     PHYSICAL EXAM:  General: Critically ill in the icu, on the vent  Rhonchi  ++ edema  Soft, nt    Lab Data Reviewed: (see below)    Medications Reviewed: (see below)    PMH/SH reviewed - no change compared to H&P  ________________________________________________________________________  Care Plan discussed with:  Patient     Family      RN y    Care Manager                    Consultant:          Comments   >50% of visit spent in counseling and coordination of care       ________________________________________________________________________  Tatum Sapp MD     Procedures: see electronic medical records for all procedures/Xrays and details which  were not copied into this note but were reviewed prior to creation of Plan. LABS:  Recent Labs      01/09/18   0407 01/08/18   1143   WBC  28.7*  23.5*   HGB  14.1  11.6*   HCT  43.2  36.2*   PLT  115*  84*     Recent Labs      01/09/18   0407  01/09/18   0035  01/08/18   1143   NA  135*  136  137   K  5.5*  5.4*  5.7*   CL  97  96*  93*   CO2  30  30  34*   BUN  89*  100*  123*   CREA  3.56*  3.75*  4.81*   GLU  238*  259*  243*   CA  7.7*  7.7*  7.4*   MG  2.3  2.3  2.5*   PHOS  6.5*  6.9*  7.9*     Recent Labs      01/09/18   0407  01/09/18   0035  01/08/18   1143  01/08/18   0452  01/07/18   0445   SGOT  56*   --    --   56*  71*   AP  70   --    --   56  59   TP  5.0*   --    --   5.0*  4.8*   ALB  1.2*  1.2*  1.1*  1.2*  1.2*   GLOB  3.8   --    --   3.8  3.6     Recent Labs      01/09/18   0035  01/08/18 2122  01/08/18   1143   APTT  59.6*  >130.0*  37.6*      No results for input(s): FE, TIBC, PSAT, FERR in the last 72 hours. No results found for: FOL, RBCF   Recent Labs      01/09/18   0450  01/07/18   0545   PH  7.26*  7.34*   PCO2  72*  65*   PO2  61*  64*     No results for input(s): CPK, CKMB in the last 72 hours.     No lab exists for component: TROPONINI  No components found for: [place]  Lab Results   Component Value Date/Time    Color YELLOW/STRAW 01/07/2018 07:19 PM    Appearance TURBID 01/07/2018 07:19 PM    Specific gravity 1.021 01/07/2018 07:19 PM    pH (UA) 5.0 01/07/2018 07:19 PM    Protein 30 01/07/2018 07:19 PM    Glucose NEGATIVE  01/07/2018 07:19 PM    Ketone NEGATIVE  01/07/2018 07:19 PM    Bilirubin NEGATIVE  01/07/2018 07:19 PM    Urobilinogen 0.2 01/07/2018 07:19 PM    Nitrites NEGATIVE  01/07/2018 07:19 PM    Leukocyte Esterase SMALL 01/07/2018 07:19 PM    Epithelial cells FEW 01/07/2018 07:19 PM    Bacteria NEGATIVE  01/07/2018 07:19 PM    WBC 10-20 01/07/2018 07:19 PM    RBC 20-50 01/07/2018 07:19 PM       MEDICATIONS:  Current Facility-Administered Medications   Medication Dose Route Frequency    sodium bicarbonate (8.4%) 100 mEq in 0.45% sodium chloride 1,000 mL infusion   IntraVENous CONTINUOUS    heparin 25,000 units in D5W 250 ml infusion  9.3-25 Units/kg/hr IntraVENous TITRATE    polyethylene glycol (MIRALAX) packet 17 g  17 g Per NG tube DAILY    heparin (porcine) injection 4,000 Units  4,000 Units IntraVENous Q6H PRN    heparin (porcine) injection 2,000 Units  2,000 Units IntraVENous Q6H PRN    cefepime (MAXIPIME) 2 g in 0.9 %  mL IVPB  2 g IntraVENous Q12H    bicarbonate dialysis (PRISMASOL) BG K 2/Ca 3.5 5000 ml solution   Extracorporeal DIALYSIS CONTINUOUS    insulin glargine (LANTUS) injection 5 Units  5 Units SubCUTAneous DAILY    famotidine (PF) (PEPCID) 20 mg in sodium chloride 0.9 % 10 mL injection  20 mg IntraVENous Q24H    fluconazole (DIFLUCAN) 200mg/100 mL IVPB (premix)  200 mg IntraVENous DAILY    methylPREDNISolone (PF) (SOLU-MEDROL) injection 40 mg  40 mg IntraVENous Q6H    metoclopramide HCl (REGLAN) injection 5 mg  5 mg IntraVENous Q6H    budesonide (PULMICORT) 500 mcg/2 ml nebulizer suspension  500 mcg Nebulization BID RT    NOREPINephrine (LEVOPHED) 32 mg in 5% dextrose 250 mL infusion  2-30 mcg/min IntraVENous TITRATE    albuterol-ipratropium (DUO-NEB) 2.5 MG-0.5 MG/3 ML  3 mL Nebulization Q6H RT    fentaNYL (PF) 900 mcg/30 ml infusion soln  0-200 mcg/hr IntraVENous TITRATE    zinc oxide-cod liver oil (DESITIN) 40 % paste   Topical BID    epoprostenol (VELETRI) 30,000 ng/mL in 0.9% sodium chloride 50 mL inhalation solution  50 ng/kg/min (Ideal) Inhalation CONTINUOUS    0.9% sodium chloride inhalation  22.4 mL/hr Inhalation CONTINUOUS    amiodarone (CORDARONE) 900 mg/250 ml D5W infusion  0.5 mg/min IntraVENous TITRATE    sodium chloride (NS) flush 10-30 mL  10-30 mL InterCATHeter PRN    sodium chloride (NS) flush 10-40 mL  10-40 mL InterCATHeter Q8H    sodium chloride (NS) flush 20 mL  20 mL InterCATHeter Q24H    heparin (porcine) pf 300 Units  300 Units InterCATHeter PRN    insulin lispro (HUMALOG) injection   SubCUTAneous Q6H    ondansetron (ZOFRAN) injection 4 mg  4 mg IntraVENous Q4H PRN    labetalol (NORMODYNE;TRANDATE) injection 10 mg  10 mg IntraVENous Q2H PRN    albuterol-ipratropium (DUO-NEB) 2.5 MG-0.5 MG/3 ML  3 mL Nebulization Q2H PRN    sodium chloride (NS) flush 5-10 mL  5-10 mL IntraVENous PRN    fentaNYL citrate (PF) injection  mcg   mcg IntraVENous Q2H PRN    chlorhexidine (PERIDEX) 0.12 % mouthwash 15 mL  15 mL Oral Q12H    metroNIDAZOLE (FLAGYL) IVPB premix 500 mg  500 mg IntraVENous Q12H    propofol (DIPRIVAN) infusion  0-50 mcg/kg/min IntraVENous TITRATE    glucose chewable tablet 16 g  4 Tab Oral PRN    dextrose (D50W) injection syrg 12.5-25 g  12.5-25 g IntraVENous PRN    glucagon (GLUCAGEN) injection 1 mg  1 mg IntraMUSCular PRN

## 2018-01-09 NOTE — DIALYSIS
[facility] ([phone]) CRRT:  [id number] cartridge running well CVVHD s incident via patent RIJ catheter with good flows upon aspiration. Dressing dry and intact and without drainage and without inflammation noted around intact biopatch. vss: BG=037/64, HR=53bpm, TAY=324yb/min, AP= -98, PV=599, bwrtcwnwe=3984, filter pressure=220, TMP=26. PD=77.  pt PE unchanged. Pre-tx consents verified/ICEHOUSE time-out performed. Writer left pt with stable vss, bed inlowest position with side rails up x3, wheels lockedx4, call bell within reach in care of nursing staff; reported off to [address].  Lilly Pederson RN

## 2018-01-09 NOTE — CARDIO/PULMONARY
C/P rehab note- chart reviewed.      Adm with Acute Resp failure,A-flutter,Acute on Chronic Systolic Heart Failure,Acute Encephalopathy. LVEF 37% per Dr. Elba Mendes intubated, sedated and on pressor. CP Rehab deferred.

## 2018-01-09 NOTE — PROGRESS NOTES
Hospitalist Progress Note    NAME: Riana Iraheta   :  1960   MRN:  [id number]       Assessment / Plan:  Shock and acute on chronic combined hypoxic/hypercarbic respiratory failure due to atypical bilateral PNA/possible ARDS in setting of chronic left lobar pneumonic process: per notes had bronch at the [place] and only E coli treated  - CXR this morning with unchanged pulmonary edema pattern  - blood cultures  no growth.  Sputum  rare yeast.  Repeat sputum  2+ WBC but no growth. - con't emperic cefepime, diflucan, flagyl  - appreciate pulmonology assistance with vent mgmt, maximized on settings with very high PEEP (18) and marginal saturations  - con't duonebs, pulmicort  - con't solumedrol  - con't veletri (max dosed), levophed, fentanyl  - propofol changed to precedex with elevated TGs  - prognosis remains poor, palliative assisting  DEISI: presumed ATN due to sepsis/shock; very little urine output. CVVHD initiated on . - UA without bacteria. +small protein, LE, WBC and RBCs  - appreciate nephrology assistance  Atrial fibrillation/flutter and acute on chronic systolic HF: s/p cardioversion 1/3, currently in atrial flutter. LVEF 37% on recent stress test at [facility]. - echo  with EF 45-50%. There were no regional wall motion abnormalities.   - con't amiodarone gtt, lopressor, eliquis  - appreciate cardiology assistance  Acute thrombocytopenia: multifactorial including sepsis, remains low but stable  - con't eliquis  - monitor closely for e/o bleeding   DM2 with steroid-induced hyperglycemia:  HgA1c 9.0  - con't lantus  - con't lispro sliding scale  Possible RA: reported by wife, has recently been on steroids  Medical noncompliance: GF noted he refused some medications and declined home O2, unclear what he was taking PTA  Obesity (Body mass index is 36.5 kg/(m^2)      Code Status: partial (wife is medical decision maker)  DVT Prophylaxis: eliquis     Subjective:     Chief Complaint / Reason for Physician Visit  Intubated, nonresponsive. Discussed with RN events overnight. Review of Systems:  Symptom Y/N Comments  Symptom Y/N Comments   Fever/Chills    Chest Pain     Poor Appetite    Edema     Cough    Abdominal Pain     Sputum    Joint Pain     SOB/DOBSON    Pruritis/Rash     Nausea/vomit    Tolerating PT/OT     Diarrhea    Tolerating Diet     Constipation    Other       Could NOT obtain due to: Intubated, sedated     Objective:     VITALS:   Last 24hrs VS reviewed since prior progress note.  Most recent are:  Patient Vitals for the past 24 hrs:   Temp Pulse Resp BP SpO2   01/09/18 1500 - (!) 52 27 (!) 87/49 (!) 87 %   01/09/18 1430 - (!) 50 20 90/53 (!) 88 %   01/09/18 1400 - (!) 59 24 134/66 (!) 88 %   01/09/18 1330 - 60 9 133/62 90 %   01/09/18 1300 - (!) 58 26 135/62 90 %   01/09/18 1230 - (!) 54 27 130/61 (!) 89 %   01/09/18 1200 97.5 °F (36.4 °C) (!) 51 30 97/52 90 %   01/09/18 1134 - 64 (!) 31 - 91 %   01/09/18 1130 - (!) 58 26 130/66 91 %   01/09/18 1100 - (!) 55 27 132/64 91 %   01/09/18 1030 - (!) 57 26 138/68 91 %   01/09/18 1000 - 64 28 131/69 91 %   01/09/18 0930 - 70 30 116/63 94 %   01/09/18 0900 - 85 30 109/73 94 %   01/09/18 0830 - 73 30 113/63 93 %   01/09/18 0821 - 73 30 - 93 %   01/09/18 0800 97.4 °F (36.3 °C) 75 30 119/63 92 %   01/09/18 0730 - 71 30 123/66 93 %   01/09/18 0700 - 73 30 115/64 92 %   01/09/18 0600 - 66 30 114/60 92 %   01/09/18 0530 - 60 30 128/67 92 %   01/09/18 0500 - (!) 59 30 114/59 91 %   01/09/18 0430 - 67 30 120/60 92 %   01/09/18 0400 96.4 °F (35.8 °C) 67 30 118/60 91 %   01/09/18 0342 - 63 30 - 91 %   01/09/18 0330 - 65 30 116/60 91 %   01/09/18 0300 - 67 30 113/61 92 %   01/09/18 0230 - 65 30 108/61 92 %   01/09/18 0200 - 67 30 98/62 91 %   01/09/18 0130 - 68 30 111/59 91 %   01/09/18 0101 - - - - (!) 89 %   01/09/18 0100 - 60 30 124/58 90 %   01/09/18 0030 - (!) 54 30 116/57 90 %   01/09/18 0001 96.6 °F (35.9 °C) (!) 57 30 112/56 90 %   01/08/18 2331 - (!) 55 30 - 90 %   01/08/18 2330 - (!) 56 30 108/55 90 %   01/08/18 2300 - (!) 56 30 108/55 90 %   01/08/18 2230 - (!) 59 30 107/54 90 %   01/08/18 2200 - (!) 57 30 106/55 (!) 89 %   01/08/18 2130 - (!) 59 30 106/54 (!) 89 %   01/08/18 2100 - 64 30 108/57 (!) 89 %   01/08/18 2030 - 65 30 106/55 (!) 88 %   01/08/18 2000 - 67 30 105/56 (!) 87 %   01/08/18 1930 96.9 °F (36.1 °C) 71 22 107/57 (!) 88 %   01/08/18 1920 - 69 30 - (!) 88 %   01/08/18 1902 - - - - (!) 86 %   01/08/18 1900 - 64 30 109/58 (!) 87 %   01/08/18 1852 - 65 30 - (!) 87 %   01/08/18 1842 - 67 30 - (!) 87 %   01/08/18 1830 - 67 30 107/55 (!) 86 %   01/08/18 1810 - 73 30 - (!) 86 %   01/08/18 1800 - 68 30 108/57 (!) 85 %   01/08/18 1700 - 86 30 110/62 (!) 85 %   01/08/18 1630 - 100 30 105/65 (!) 85 %   01/08/18 1600 97.9 °F (36.6 °C) 99 30 92/54 (!) 80 %       Intake/Output Summary (Last 24 hours) at 01/09/18 1540  Last data filed at 01/09/18 1502   Gross per 24 hour   Intake          3921.64 ml   Output             5147 ml   Net         -1225.36 ml        PHYSICAL EXAM:  General: WD, WN. Alert, cooperative, no acute distress    EENT:  EOM not intact. Anicteric sclerae. ET tube in place. Resp:  CTA bilaterally, no wheezing or rales. No accessory muscle use  CV:  Regular rhythm,  No edema  GI:  Soft, mildly distended, Non tender.  +Bowel sounds  Neurologic:  Oriented X 0, no speech   Psych:   No insight. Not anxious nor agitated  Skin:  No rashes.  No jaundice    Reviewed most current lab test results and cultures  YES  Reviewed most current radiology test results   YES  Review and summation of old records today    NO  Reviewed patient's current orders and MAR    YES  PMH/SH reviewed - no change compared to H&P  ________________________________________________________________________  Care Plan discussed with:    Comments   Patient x    Family      RN x    Care Manager     Consultant                        Multidiciplinary team rounds were held today with , nursing, pharmacist and clinical coordinator. Patient's plan of care was discussed; medications were reviewed and discharge planning was addressed. ________________________________________________________________________  Total NON critical care TIME:  25 Minutes    Total CRITICAL CARE TIME Spent:   Minutes non procedure based      Comments   >50% of visit spent in counseling and coordination of care x    ________________________________________________________________________  Kellie Trinh MD     Procedures: see electronic medical records for all procedures/Xrays and details which were not copied into this note but were reviewed prior to creation of Plan. LABS:  I reviewed today's most current labs and imaging studies.   Pertinent labs include:  Recent Labs      01/09/18   0407  01/08/18   1143  01/08/18   0452   WBC  28.7*  23.5*  26.0*   HGB  14.1  11.6*  13.7   HCT  43.2  36.2*  41.4   PLT  115*  84*  87*     Recent Labs      01/09/18   1157  01/09/18   0407  01/09/18   0035  01/08/18   1143  01/08/18   0452  01/07/18   0445   NA   --   135*  136  137  139  136   K   --   5.5*  5.4*  5.7*  5.6*  5.2*   CL   --   97  96*  93*  95*  95*   CO2   --   30  30  34*  34*  35*   GLU   --   238*  259*  243*  189*  251*   BUN   --   89*  100*  123*  124*  105*   CREA   --   3.56*  3.75*  4.81*  4.76*  3.80*   CA   --   7.7*  7.7*  7.4*  7.7*  7.8*   MG   --   2.3  2.3  2.5*  2.5*  2.4   PHOS  5.7*  6.5*  6.9*  7.9*  7.8*  7.1*   ALB   --   1.2*  1.2*  1.1*  1.2*  1.2*   TBILI   --   0.9   --    --   0.5  0.4   SGOT   --   56*   --    --   56*  71*   ALT   --   29   --    --   29  34       Signed: Kellie Trinh MD

## 2018-01-09 NOTE — PROGRESS NOTES
PICC line dressing changed per protocol by Nova Gong RN. PICC site and dressing clean, dry and intact. Positive blood return from each port. Medication infusing to all lumen. All end caps changed. New bio patch and stat lock applied. No complications noted. Ministerio Cook RN. BSN. CRNI. CMSRN. PICC Nurse.  Vascular Access Team

## 2018-01-09 NOTE — PROGRESS NOTES
[facility]  Pulmonary, Critical Care, and Sleep Medicine    Name: Anders Logan MRN: [id number]   : 1960 Hospital: [facility]   Date: 2018        IMPRESSION:   · Acute/chronic hypoxic/hypercapnic respiratory failure  · Severe baseline ILD of undetermined etiology  · ARDS  · Acute renal failure  · Shock  · CHF - LVEF 37% on recent stress test at [facility]  · Afib  · DM  · RA  · Noncompliance - he has refused O2 and not taken many of the meds prescribe to him      PLAN:   · Ventilator support - unable to reach ARDS goals despite low TV ventilation - on maximal support - has very high Vd/Vt and unable to adequately exchange CO2 or O2  · On max dose nebulized epoprostenol - desaturated when dose reduced  · Increase bicarb drip - baseline serum bicarb was 42, and now needs additional for \"permissive hypercapnia\"  · CRRT per nephrology  · Pressors  · Amiodarone per cardiology   · Broad spectrum empiric antibiotics - stop date in place  · Insulin   · Sedation - will have to stop propofol due to increasing triglycerides, will use fentanyl and Precedex  · On heparin drip due to afib/chronic anticoagulation  · Patient requiring restraints due to threat or injury to self  · GI prophylaxis  · Palliative care evaluation ongoing. Prognosis very poor     Subjective/Interval History:   I have reviewed the flowsheet and previous days notes. The patient is unable to give any meaningful history or review of systems because the patient is:   Intubated/sedated     The patient is critically ill on:      Mechanical ventilation/pressors     Review of Systems   Unable to perform ROS: Intubated     Objective:   Vital Signs:    Visit Vitals    /63    Pulse 73    Temp 96.4 °F (35.8 °C)    Resp 30    Ht 5' 7\" (1.702 m)    Wt 105.7 kg (233 lb 0.4 oz)    SpO2 93%    BMI 36.5 kg/m2       O2 Device: Endotracheal tube   O2 Flow Rate (L/min): 15 l/min   Temp (24hrs), Av.2 °F (36.2 °C), Min:96.4 °F (35.8 °C), Max:98 °F (36.7 °C)       Intake/Output:   Last shift:      01/09 0701 - 01/09 1900  In: -   Out: 203   Last 3 shifts: 01/07 1901 - 01/09 0700  In: 4939.2 [I.V.:4689.2]  Out: 9107 [Urine:302]    Intake/Output Summary (Last 24 hours) at 01/09/18 0839  Last data filed at 01/09/18 0819   Gross per 24 hour   Intake          3519.06 ml   Output             3503 ml   Net            16.06 ml     Hemodynamics:   PAP:   CO:     Wedge:   CI:     CVP:  CVP (mmHg): 10 mmHg (01/09/18 0800) SVR:       PVR:       Ventilator Settings:  Mode Rate Tidal Volume Pressure FiO2 PEEP   Assist control   400 ml    100 % 10 cm H20     Peak airway pressure: 41 cm H2O    Minute ventilation: 13.6 l/min       Physical Exam   Constitutional: He appears ill. He is sedated and intubated. HENT:   Head: Normocephalic and atraumatic. Mouth/Throat: No oropharyngeal exudate. Eyes: No scleral icterus. Cardiovascular: Normal rate and regular rhythm. Pulmonary/Chest: He is intubated. He has no wheezes. He has rales. Abdominal: Soft. Bowel sounds are normal. He exhibits no distension. There is no tenderness. Musculoskeletal: He exhibits edema. Skin: Skin is warm and dry.      Data:     Current Facility-Administered Medications   Medication Dose Route Frequency    sodium bicarbonate (8.4%) 100 mEq in 0.45% sodium chloride 1,000 mL infusion   IntraVENous CONTINUOUS    heparin 25,000 units in D5W 250 ml infusion  9.3-25 Units/kg/hr IntraVENous TITRATE    polyethylene glycol (MIRALAX) packet 17 g  17 g Per NG tube DAILY    cefepime (MAXIPIME) 2 g in 0.9 %  mL IVPB  2 g IntraVENous Q12H    bicarbonate dialysis (PRISMASOL) BG K 2/Ca 3.5 5000 ml solution   Extracorporeal DIALYSIS CONTINUOUS    insulin glargine (LANTUS) injection 5 Units  5 Units SubCUTAneous DAILY    famotidine (PF) (PEPCID) 20 mg in sodium chloride 0.9 % 10 mL injection  20 mg IntraVENous Q24H    fluconazole (DIFLUCAN) 200mg/100 mL IVPB (premix)  200 mg IntraVENous DAILY    methylPREDNISolone (PF) (SOLU-MEDROL) injection 40 mg  40 mg IntraVENous Q6H    metoclopramide HCl (REGLAN) injection 5 mg  5 mg IntraVENous Q6H    NOREPINephrine (LEVOPHED) 32 mg in 5% dextrose 250 mL infusion  2-30 mcg/min IntraVENous TITRATE    fentaNYL (PF) 900 mcg/30 ml infusion soln  0-200 mcg/hr IntraVENous TITRATE    zinc oxide-cod liver oil (DESITIN) 40 % paste   Topical BID    epoprostenol (VELETRI) 30,000 ng/mL in 0.9% sodium chloride 50 mL inhalation solution  50 ng/kg/min (Ideal) Inhalation CONTINUOUS    0.9% sodium chloride inhalation  22.4 mL/hr Inhalation CONTINUOUS    amiodarone (CORDARONE) 900 mg/250 ml D5W infusion  0.5 mg/min IntraVENous TITRATE    sodium chloride (NS) flush 10-40 mL  10-40 mL InterCATHeter Q8H    sodium chloride (NS) flush 20 mL  20 mL InterCATHeter Q24H    insulin lispro (HUMALOG) injection   SubCUTAneous Q6H    chlorhexidine (PERIDEX) 0.12 % mouthwash 15 mL  15 mL Oral Q12H    metroNIDAZOLE (FLAGYL) IVPB premix 500 mg  500 mg IntraVENous Q12H    propofol (DIPRIVAN) infusion  0-50 mcg/kg/min IntraVENous TITRATE                Labs:  Recent Labs      01/09/18   0407  01/08/18   1143  01/08/18   0452   WBC  28.7*  23.5*  26.0*   HGB  14.1  11.6*  13.7   HCT  43.2  36.2*  41.4   PLT  115*  84*  87*     Recent Labs      01/09/18   0407  01/09/18   0035  01/08/18   1143  01/08/18   0452  01/07/18   0445   NA  135*  136  137  139  136   K  5.5*  5.4*  5.7*  5.6*  5.2*   CL  97  96*  93*  95*  95*   CO2  30  30  34*  34*  35*   GLU  238*  259*  243*  189*  251*   BUN  89*  100*  123*  124*  105*   CREA  3.56*  3.75*  4.81*  4.76*  3.80*   CA  7.7*  7.7*  7.4*  7.7*  7.8*   MG  2.3  2.3  2.5*  2.5*  2.4   PHOS  6.5*  6.9*  7.9*  7.8*  7.1*   ALB  1.2*  1.2*  1.1*  1.2*  1.2*   TBILI  0.9   --    --   0.5  0.4   SGOT  56*   --    --   56*  71*   ALT  29   --    --   29  34     Recent Labs      01/09/18   0450  01/07/18   0545 01/06/18   1050   PH  7.26*  7.34*  7.32*   PCO2  72*  65*  75*   PO2  61*  64*  60*   HCO3  31*  34*  37*   FIO2  100  100  100     Imaging:  I have personally reviewed the patients radiographs and have reviewed the reports:  No change in diffuse bilateral infiltrates        Total critical care time exclusive of procedures: 40 minutes  Mildred Betts MD

## 2018-01-09 NOTE — PROGRESS NOTES
Interdisciplinary team rounds were held 1/9/2018  with the following team members: Care Management, Nursing, Nutrition, Patient Relations, Physical Therapy, Physician and Respiratory Therapy. Plan of care discussed. Goal: Adjust medications, continue to monitor and support. See MD orders and progress notes for further  interventions and desired outcomes.

## 2018-01-09 NOTE — PROGRESS NOTES
0730 Report received from night nurse, Tristan Bailey RN. Assumed care of patient. 0800 Assessment as documented. Patient sedated and on vent with high support settings, AC 30, Tv 400, FIO2 100% and 18 PEEP. Patient compliant with the vent, small amount of ETT secretions. Currently on Levophed, Fentanyl, Amiodarone, and Propofol gtts. Tube feedings on hold because of residual > 240ml. 0900 Orders to d/c Propofol and start Precedex, may also adjust Fentanyl as needed. Fentanyl increased to 150mcg while Propofol weaned off.    0930 Precedex started, Propofol off. Weaning Levo as tolerated. 1030 Patient continues to withdraw from painful stimuli, noted to be slightly more restless, nods head \"no\" to questions, suctioning and when bed rotates. 1100 Gastric residuals remain >240, Continue to hold tube feedings. 1200 Assessment unchanged from previous. 1230 Continuous lateral rotation in progress. Sats down to 87%. Will monitor. 1400 Levophed weaned off, will monitor. Labs called to Dr. Coby Mccord. 1445 Sats remain around 80-90%, heart rate in 50, occasionally will dip down to 45, PAC, PVCs noted. Decreased Precedex to 0.3  1525 Restarted Levophed because of SBP in 80's. Decreased Fentanyl to 100 mcg. Will monitor BP.  1600 Assessment findings essentially unchanged, patient continues to nod head and moves all extremities spontaneously. 1800 Patient more awake, occasionally will open eyes. Nods both \"yes\" and \"no\". Denies pain, moves all extremities spontaneously. Residuals down to 120, tube feedings restarted. 1900 Report given to on-coming nurse, Desiree Serrano RN.

## 2018-01-09 NOTE — PROGRESS NOTES
Cardiology Progress Note      1/9/2018 4:36 PM    Admit Date: 1/1/2018    Admit Diagnosis: Acute encephalopathy      Subjective:     Leland Michele remains intubated, no significant change.     Visit Vitals    /67 (BP 1 Location: Left arm, BP Patient Position: At rest)    Pulse (!) 54    Temp 97.4 °F (36.3 °C)    Resp (!) 31    Ht 5' 7\" (1.702 m)    Wt 233 lb 0.4 oz (105.7 kg)    SpO2 93%    BMI 36.5 kg/m2       Current Facility-Administered Medications   Medication Dose Route Frequency    albuterol-ipratropium (DUO-NEB) 2.5 MG-0.5 MG/3 ML  3 mL Nebulization Q6H PRN    sodium bicarbonate (8.4%) 150 mEq in sterile water 1,000 mL infusion   IntraVENous CONTINUOUS    dexmedeTOMidine (PRECEDEX) 400 mcg in 0.9% sodium chloride 100 mL infusion  0.2-1.4 mcg/kg/hr IntraVENous TITRATE    heparin 25,000 units in D5W 250 ml infusion  9.3-25 Units/kg/hr IntraVENous TITRATE    polyethylene glycol (MIRALAX) packet 17 g  17 g Per NG tube DAILY    heparin (porcine) injection 4,000 Units  4,000 Units IntraVENous Q6H PRN    heparin (porcine) injection 2,000 Units  2,000 Units IntraVENous Q6H PRN    cefepime (MAXIPIME) 2 g in 0.9 %  mL IVPB  2 g IntraVENous Q12H    bicarbonate dialysis (PRISMASOL) BG K 2/Ca 3.5 5000 ml solution   Extracorporeal DIALYSIS CONTINUOUS    insulin glargine (LANTUS) injection 5 Units  5 Units SubCUTAneous DAILY    famotidine (PF) (PEPCID) 20 mg in sodium chloride 0.9 % 10 mL injection  20 mg IntraVENous Q24H    fluconazole (DIFLUCAN) 200mg/100 mL IVPB (premix)  200 mg IntraVENous DAILY    methylPREDNISolone (PF) (SOLU-MEDROL) injection 40 mg  40 mg IntraVENous Q6H    metoclopramide HCl (REGLAN) injection 5 mg  5 mg IntraVENous Q6H    NOREPINephrine (LEVOPHED) 32 mg in 5% dextrose 250 mL infusion  2-30 mcg/min IntraVENous TITRATE    fentaNYL (PF) 900 mcg/30 ml infusion soln  0-200 mcg/hr IntraVENous TITRATE    zinc oxide-cod liver oil (DESITIN) 40 % paste   Topical BID    epoprostenol (VELETRI) 30,000 ng/mL in 0.9% sodium chloride 50 mL inhalation solution  50 ng/kg/min (Ideal) Inhalation CONTINUOUS    0.9% sodium chloride inhalation  22.4 mL/hr Inhalation CONTINUOUS    amiodarone (CORDARONE) 900 mg/250 ml D5W infusion  0.5 mg/min IntraVENous TITRATE    sodium chloride (NS) flush 10-30 mL  10-30 mL InterCATHeter PRN    sodium chloride (NS) flush 10-40 mL  10-40 mL InterCATHeter Q8H    sodium chloride (NS) flush 20 mL  20 mL InterCATHeter Q24H    heparin (porcine) pf 300 Units  300 Units InterCATHeter PRN    insulin lispro (HUMALOG) injection   SubCUTAneous Q6H    ondansetron (ZOFRAN) injection 4 mg  4 mg IntraVENous Q4H PRN    labetalol (NORMODYNE;TRANDATE) injection 10 mg  10 mg IntraVENous Q2H PRN    sodium chloride (NS) flush 5-10 mL  5-10 mL IntraVENous PRN    fentaNYL citrate (PF) injection  mcg   mcg IntraVENous Q2H PRN    chlorhexidine (PERIDEX) 0.12 % mouthwash 15 mL  15 mL Oral Q12H    metroNIDAZOLE (FLAGYL) IVPB premix 500 mg  500 mg IntraVENous Q12H    glucose chewable tablet 16 g  4 Tab Oral PRN    dextrose (D50W) injection syrg 12.5-25 g  12.5-25 g IntraVENous PRN    glucagon (GLUCAGEN) injection 1 mg  1 mg IntraMUSCular PRN         Objective:      Physical Exam:  Visit Vitals    /67 (BP 1 Location: Left arm, BP Patient Position: At rest)    Pulse (!) 54    Temp 97.4 °F (36.3 °C)    Resp (!) 31    Ht 5' 7\" (1.702 m)    Wt 233 lb 0.4 oz (105.7 kg)    SpO2 93%    BMI 36.5 kg/m2     General Appearance:  Well developed, well nourished,alert and oriented x 0, and individual in no acute distress. Ears/Nose/Mouth/Throat:    grossly normal.         Neck: Supple. Chest:   Lungs clear to auscultation bilaterally. Cardiovascular:  Regular rate and rhythm, S1, S2 normal, no murmur. Abdomen:   Soft, non-tender, bowel sounds are active. Extremities: No edema bilaterally. Skin: Warm and dry.                Data Review: Labs:    Recent Results (from the past 24 hour(s))   GLUCOSE, POC    Collection Time: 01/08/18  5:27 PM   Result Value Ref Range    Glucose (POC) 220 (H) 65 - 100 mg/dL    Performed by Telly Cornell    PTT    Collection Time: 01/08/18  9:22 PM   Result Value Ref Range    aPTT >130.0 (HH) 22.1 - 32.5 sec    aPTT, therapeutic range     58.0 - 77.0 SECS   GLUCOSE, POC    Collection Time: 01/08/18 11:30 PM   Result Value Ref Range    Glucose (POC) 176 (H) 65 - 100 mg/dL    Performed by Roxanna Kenney    PTT    Collection Time: 01/09/18 12:35 AM   Result Value Ref Range    aPTT 59.6 (H) 22.1 - 32.5 sec    aPTT, therapeutic range     58.0 - 77.0 SECS   RENAL FUNCTION PANEL    Collection Time: 01/09/18 12:35 AM   Result Value Ref Range    Sodium 136 136 - 145 mmol/L    Potassium 5.4 (H) 3.5 - 5.1 mmol/L    Chloride 96 (L) 97 - 108 mmol/L    CO2 30 21 - 32 mmol/L    Anion gap 10 5 - 15 mmol/L    Glucose 259 (H) 65 - 100 mg/dL     (H) 6 - 20 MG/DL    Creatinine 3.75 (H) 0.70 - 1.30 MG/DL    BUN/Creatinine ratio 27 (H) 12 - 20      GFR est AA 20 (L) >60 ml/min/1.73m2    GFR est non-AA 17 (L) >60 ml/min/1.73m2    Calcium 7.7 (L) 8.5 - 10.1 MG/DL    Phosphorus 6.9 (H) 2.6 - 4.7 MG/DL    Albumin 1.2 (L) 3.5 - 5.0 g/dL   MAGNESIUM    Collection Time: 01/09/18 12:35 AM   Result Value Ref Range    Magnesium 2.3 1.6 - 2.4 mg/dL   TRIGLYCERIDE    Collection Time: 01/09/18  4:07 AM   Result Value Ref Range    Triglyceride 779 (H) <150 MG/DL   CBC WITH AUTOMATED DIFF    Collection Time: 01/09/18  4:07 AM   Result Value Ref Range    WBC 28.7 (H) 4.1 - 11.1 K/uL    RBC 4.72 4.10 - 5.70 M/uL    HGB 14.1 12.1 - 17.0 g/dL    HCT 43.2 36.6 - 50.3 %    MCV 91.5 80.0 - 99.0 FL    MCH 29.9 26.0 - 34.0 PG    MCHC 32.6 30.0 - 36.5 g/dL    RDW 15.6 (H) 11.5 - 14.5 %    PLATELET 815 (L) 995 - 400 K/uL    NEUTROPHILS 94 %    BAND NEUTROPHILS 3 %    LYMPHOCYTES 1 %    MONOCYTES 2 %    EOSINOPHILS 0 %    BASOPHILS 0 %    ABS.  NEUTROPHILS 27.8 K/UL    ABS. LYMPHOCYTES 0.3 K/UL    ABS. MONOCYTES 0.6 K/UL    ABS. EOSINOPHILS 0.0 K/UL    ABS. BASOPHILS 0.0 K/UL    RBC COMMENTS ANISOCYTOSIS  1+        WBC COMMENTS SMUDGE CELLS      DF MANUAL     METABOLIC PANEL, COMPREHENSIVE    Collection Time: 01/09/18  4:07 AM   Result Value Ref Range    Sodium 135 (L) 136 - 145 mmol/L    Potassium 5.5 (H) 3.5 - 5.1 mmol/L    Chloride 97 97 - 108 mmol/L    CO2 30 21 - 32 mmol/L    Anion gap 8 5 - 15 mmol/L    Glucose 238 (H) 65 - 100 mg/dL    BUN 89 (H) 6 - 20 MG/DL    Creatinine 3.56 (H) 0.70 - 1.30 MG/DL    BUN/Creatinine ratio 25 (H) 12 - 20      GFR est AA 21 (L) >60 ml/min/1.73m2    GFR est non-AA 18 (L) >60 ml/min/1.73m2    Calcium 7.7 (L) 8.5 - 10.1 MG/DL    Bilirubin, total 0.9 0.2 - 1.0 MG/DL    ALT (SGPT) 29 12 - 78 U/L    AST (SGOT) 56 (H) 15 - 37 U/L    Alk.  phosphatase 70 45 - 117 U/L    Protein, total 5.0 (L) 6.4 - 8.2 g/dL    Albumin 1.2 (L) 3.5 - 5.0 g/dL    Globulin 3.8 2.0 - 4.0 g/dL    A-G Ratio 0.3 (L) 1.1 - 2.2     MAGNESIUM    Collection Time: 01/09/18  4:07 AM   Result Value Ref Range    Magnesium 2.3 1.6 - 2.4 mg/dL   PHOSPHORUS    Collection Time: 01/09/18  4:07 AM   Result Value Ref Range    Phosphorus 6.5 (H) 2.6 - 4.7 MG/DL   NUCLEATED RBC    Collection Time: 01/09/18  4:07 AM   Result Value Ref Range    NRBC 0.2 (H) 0  WBC    ABSOLUTE NRBC 0.06 (H) 0.00 - 0.01 K/uL    WBC CORRECTED FOR NR ADJUSTED FOR NUCLEATED RBC'S     BLOOD GAS, ARTERIAL    Collection Time: 01/09/18  4:50 AM   Result Value Ref Range    pH 7.26 (L) 7.35 - 7.45      PCO2 72 (H) 35.0 - 45.0 mmHg    PO2 61 (L) 80 - 100 mmHg    O2 SAT 87 (L) 92 - 97 %    BICARBONATE 31 (H) 22 - 26 mmol/L    BASE EXCESS 1.8 mmol/L    O2 METHOD VENTILATOR      FIO2 100 %    MODE A/C      Tidal volume 400      SET RATE 30      EPAP/CPAP/PEEP 18.0      Sample source ARTERIAL      SITE RIGHT RADIAL      SYBIL'S TEST YES     GLUCOSE, POC    Collection Time: 01/09/18  5:15 AM   Result Value Ref Range    Glucose (POC) 161 (H) 65 - 100 mg/dL    Performed by Barbara Dugan    PTT    Collection Time: 01/09/18  6:53 AM   Result Value Ref Range    aPTT 56.0 (H) 22.1 - 32.5 sec    aPTT, therapeutic range     58.0 - 77.0 SECS   GLUCOSE, POC    Collection Time: 01/09/18 11:45 AM   Result Value Ref Range    Glucose (POC) 152 (H) 65 - 100 mg/dL    Performed by Romelia WING    PTT    Collection Time: 01/09/18 11:57 AM   Result Value Ref Range    aPTT 62.2 (H) 22.1 - 32.5 sec    aPTT, therapeutic range     58.0 - 77.0 SECS   PHOSPHORUS    Collection Time: 01/09/18 11:57 AM   Result Value Ref Range    Phosphorus 5.7 (H) 2.6 - 4.7 MG/DL   RENAL FUNCTION PANEL    Collection Time: 01/09/18 11:57 AM   Result Value Ref Range    Sodium 137 136 - 145 mmol/L    Potassium 5.0 3.5 - 5.1 mmol/L    Chloride 99 97 - 108 mmol/L    CO2 31 21 - 32 mmol/L    Anion gap 7 5 - 15 mmol/L    Glucose 190 (H) 65 - 100 mg/dL    BUN 82 (H) 6 - 20 MG/DL    Creatinine 2.99 (H) 0.70 - 1.30 MG/DL    BUN/Creatinine ratio 27 (H) 12 - 20      GFR est AA 26 (L) >60 ml/min/1.73m2    GFR est non-AA 22 (L) >60 ml/min/1.73m2    Calcium 8.2 (L) 8.5 - 10.1 MG/DL    Phosphorus 5.6 (H) 2.6 - 4.7 MG/DL    Albumin 1.3 (L) 3.5 - 5.0 g/dL       Telemetry: AFIB      Assessment:     Principal Problem:    Acute respiratory failure (HCC) (1/1/2018)    Active Problems:    Acute encephalopathy (1/1/2018)      Typical atrial flutter (HCC) (1/1/2018)      Acute on chronic systolic HF (heart failure) (HCC) (1/2/2018)      Acute renal insufficiency (1/2/2018)      SOB (shortness of breath) (1/2/2018)      Abdominal distention (1/2/2018)        Plan:     Remains in intermittent flutter. Continue amiodarone drip. Hold beta blocker while on pressors.     Adiel Montenegro MD

## 2018-01-10 NOTE — PROGRESS NOTES
0730 Bedside shift report received from night nurseWill RN. Assumed care of patient. 0800 Assessment as documented. Patient currently sedated and vented. Settings AC rate of 30, Tv 400, FIO2 100%, and +18 PEEP. Patient attempts to open eyes when name is called, nods head appropriately and follows simple commands. Currently on Precedex, Fentanyl, Levophed, Bicarb and Amiodarone drips and Velitri inhalant via vent. CVVHD currently running without any issues. Tube feedings currently at 30ml/hour, residuals at 150. Will monitor and wean Levophed as tolerated. 0940 Levophed weaned off, will monitor closely. 200 Patient's brother-in-law in to see patient. Remains off of Levophed at this time. Continue to monitor. 1200 Assessment unchanged from previous. Remains off of Levophed, BP stable. 706 [facility] called to Dr. Karla Rutledge. No new orders. 1600 Assessment unchanged from previous    1900 Beside report given to on-coming nurseWill RN.

## 2018-01-10 NOTE — PROGRESS NOTES
Hospitalist Progress Note    NAME: Jordan Andres   :  1960   MRN:  [id number]       Assessment / Plan:  Shock and acute on chronic combined hypoxic/hypercarbic respiratory failure due to atypical bilateral PNA/possible ARDS in setting of chronic left lobar pneumonic process: per notes had bronchoscopy at the [facility] with E coli treated  - CXR this morning with pulmonary edema not significantly changed  - blood cultures  no growth.  Sputum  rare yeast.  Repeat sputum  2+ WBC but no growth. - con't emperic cefepime, diflucan, flagyl  - appreciate pulmonology assistance with vent mgmt - Pt continues to have very high PEEP (18) and marginal saturations  - con't duonebs, pulmicort, solumedrol q8hrs  - con't veletri (max dosed), levophed, fentanyl  - propofol changed to precedex with elevated TGs  DEISI: presumed ATN due to sepsis/shock; very little urine output. CVVHD initiated on . - UA without bacteria. +small protein, LE, WBC and RBCs  - appreciate nephrology assistance, con't CVVHD  Atrial fibrillation/flutter and acute on chronic systolic HF: s/p cardioversion 1/3, currently in atrial flutter.  LVEF 37% on recent stress test at [facility]. - echo  with EF 45-50%. There were no regional wall motion abnormalities. - con't amiodarone gtt  - off lopressor, eliquis  - appreciate cardiology assistance  Acute thrombocytopenia: multifactorial including sepsis, remains low but stable   DM2 with steroid-induced hyperglycemia:  HgA1c 9.0. Con't lantus, lispro sliding scale. Possible RA: reported by wife, has recently been on steroids  Medical noncompliance: GF noted he refused some medications and declined home O2, unclear what he was taking PTA  Obesity (Body mass index is 35.5 kg/(m^2)      Code Status: partial (wife is medical decision maker)  DVT Prophylaxis: eliquis     Subjective:     Chief Complaint / Reason for Physician Visit  Intubated, sedated. Discussed with RN events overnight. Review of Systems:  Symptom Y/N Comments  Symptom Y/N Comments   Fever/Chills    Chest Pain     Poor Appetite    Edema     Cough    Abdominal Pain     Sputum    Joint Pain     SOB/DOBSON    Pruritis/Rash     Nausea/vomit    Tolerating PT/OT     Diarrhea    Tolerating Diet     Constipation    Other       Could NOT obtain due to: Intubated, sedated     Objective:     VITALS:   Last 24hrs VS reviewed since prior progress note.  Most recent are:  Patient Vitals for the past 24 hrs:   Temp Pulse Resp BP SpO2   01/10/18 1300 - (!) 109 27 114/66 91 %   01/10/18 1230 97.5 °F (36.4 °C) (!) 105 19 129/82 (!) 81 %   01/10/18 1200 - (!) 101 28 - 92 %   01/10/18 1158 - (!) 104 30 - 92 %   01/10/18 1130 - (!) 104 25 104/67 93 %   01/10/18 1100 - 71 27 99/58 95 %   01/10/18 1030 - 73 29 101/58 95 %   01/10/18 1000 - 80 29 108/68 94 %   01/10/18 0900 - 71 26 100/58 93 %   01/10/18 0830 - (!) 102 27 95/55 93 %   01/10/18 0800 97.5 °F (36.4 °C) (!) 126 26 97/73 92 %   01/10/18 0752 - (!) 126 30 - 92 %   01/10/18 0730 - 95 21 108/70 94 %   01/10/18 0700 - (!) 118 24 98/65 93 %   01/10/18 0630 - 93 28 97/54 92 %   01/10/18 0602 - (!) 101 26 104/55 93 %   01/10/18 0500 - (!) 106 27 (!) 141/91 95 %   01/10/18 0449 - (!) 125 (!) 31 - 95 %   01/10/18 0400 96.7 °F (35.9 °C) (!) 117 24 102/78 94 %   01/10/18 0330 - (!) 103 26 107/67 94 %   01/10/18 0311 - (!) 118 28 (!) 88/69 94 %   01/10/18 0300 - 94 27 (!) 87/53 95 %   01/10/18 0200 - (!) 106 (!) 31 110/65 93 %   01/10/18 0100 - (!) 115 28 159/74 91 %   01/10/18 0030 - 82 25 127/62 (!) 89 %   01/10/18 0001 96.2 °F (35.7 °C) (!) 55 28 111/60 95 %   01/09/18 2330 - 68 29 141/67 95 %   01/09/18 2300 - 69 25 145/83 95 %   01/09/18 2230 - (!) 50 30 114/60 96 %   01/09/18 2200 - (!) 53 29 111/58 95 %   01/09/18 2100 - (!) 56 28 125/63 95 %   01/09/18 2038 - (!) 55 30 - 95 %   01/09/18 2030 - 65 30 120/61 94 %   01/09/18 2000 96.1 °F (35.6 °C) (!) 50 28 109/58 94 %   01/09/18 1930 - (!) 58 30 119/60 94 %   01/09/18 1900 - (!) 55 27 111/61 94 %   01/09/18 1830 - 60 29 123/61 93 %   01/09/18 1800 - (!) 50 30 106/58 93 %   01/09/18 1730 - (!) 51 27 126/64 93 %   01/09/18 1700 - (!) 57 27 111/62 93 %   01/09/18 1605 - (!) 54 (!) 31 - 93 %   01/09/18 1600 97.4 °F (36.3 °C) (!) 59 29 137/67 92 %   01/09/18 1539 - (!) 59 16 125/58 (!) 87 %   01/09/18 1530 - (!) 56 27 - (!) 88 %   01/09/18 1500 - (!) 52 27 (!) 87/49 (!) 87 %   01/09/18 1430 - (!) 50 20 90/53 (!) 88 %       Intake/Output Summary (Last 24 hours) at 01/10/18 1401  Last data filed at 01/10/18 1306   Gross per 24 hour   Intake          4340.71 ml   Output             5873 ml   Net         -1532.29 ml        PHYSICAL EXAM:  General: WD, WN. Not alert, not cooperative, no acute distress    EENT:  EOMI. Anicteric sclerae. MMM, ET tube in place without apparent complication  Resp:  CTA bilaterally, no wheezing or rales. No accessory muscle use  CV:  Regular rhythm,  No edema  GI:  Soft, Non distended, Non tender.  +Bowel sounds  Neurologic:  Oriented X 3, no speech,   Psych:   No insight. Not anxious nor agitated  Skin:  No rashes. No jaundice    Reviewed most current lab test results and cultures  YES  Reviewed most current radiology test results   YES  Review and summation of old records today    NO  Reviewed patient's current orders and MAR    YES  PMH/SH reviewed - no change compared to H&P  ________________________________________________________________________  Care Plan discussed with:    Comments   Patient x    Family      RN     Care Manager     Consultant                        Multidiciplinary team rounds were held today with , nursing, pharmacist and clinical coordinator. Patient's plan of care was discussed; medications were reviewed and discharge planning was addressed.      ________________________________________________________________________  Total NON critical care TIME:  25 Minutes    Total CRITICAL CARE TIME Spent: Minutes non procedure based      Comments   >50% of visit spent in counseling and coordination of care x    ________________________________________________________________________  Jesusita Inman MD     Procedures: see electronic medical records for all procedures/Xrays and details which were not copied into this note but were reviewed prior to creation of Plan. LABS:  I reviewed today's most current labs and imaging studies. Pertinent labs include:  Recent Labs      01/10/18   0425 01/09/18   0407  01/08/18   1143   WBC  29.3*  28.7*  23.5*   HGB  14.0  14.1  11.6*   HCT  43.5  43.2  36.2*   PLT  94*  115*  84*     Recent Labs      01/10/18   1153  01/10/18   0425  01/09/18   1923   01/09/18   0407   01/08/18   0452   NA  137  137  139   < >  135*   < >  139   K  4.4  4.6  4.5   < >  5.5*   < >  5.6*   CL  98  100  100   < >  97   < >  95*   CO2  34*  30  32   < >  30   < >  34*   GLU  187*  165*  138*   < >  238*   < >  189*   BUN  55*  64*  72*   < >  89*   < >  124*   CREA  2.17*  2.44*  2.72*   < >  3.56*   < >  4.76*   CA  8.1*  8.2*  8.0*   < >  7.7*   < >  7.7*   MG   --   2.1  2.1   --   2.3   < >  2.5*   PHOS  4.2  4.5  5.0*   < >  6.5*   < >  7.8*   ALB  1.4*  1.3*  1.3*   < >  1.2*   < >  1.2*   TBILI   --   0.4   --    --   0.9   --   0.5   SGOT   --   52*   --    --   56*   --   56*   ALT   --   25   --    --   29   --   29    < > = values in this interval not displayed.        Signed: Jesusita Inman MD

## 2018-01-10 NOTE — PROGRESS NOTES
Shazia Piedra Major        :  1960        MRN:  [id number]        Assessment :    Plan:  --DEISI    Hyperkalemia     Acute hypoxic and hypercarbic respiratory failure with evolving acute respiratory distress syndrome. Fluid overload with pulmonary edema, scrotal and peripheral edema. Bilateral pneumonia      Atrial fibrillation/flutter with rapid ventricular rate. Thrombocytopenia. Cardiomyopathy     Severe chronic obstructive pulmonary disease on home O2 prior to admission --ATN; cvvhd initiated on ; tolerating factor 50 (volume overload is better). continue bicarb gtt - I agree with increased rate (chronic CO2 retention; needs higher bicarb to compensate)           Subjective:     Chief Complaint: Unable to obtain. On vent. On levo gtt only at 1, but drops BP when off levo. On CVVHD (factor 50)      Review of Systems:    Symptom Y/N Comments  Symptom Y/N Comments   Fever/Chills    Chest Pain     Poor Appetite    Edema     Cough    Abdominal Pain     Sputum    Joint Pain     SOB/DOBSON    Pruritis/Rash     Nausea/vomit    Tolerating PT/OT     Diarrhea    Tolerating Diet     Constipation    Other       Could not obtain due to: See above     Objective:     VITALS:   Last 24hrs VS reviewed since prior progress note.  Most recent are:  Visit Vitals    /58    Pulse 71    Temp 97.5 °F (36.4 °C)    Resp 26    Ht 5' 7\" (1.702 m)    Wt 102.8 kg (226 lb 10.1 oz)    SpO2 93%    BMI 35.5 kg/m2       Intake/Output Summary (Last 24 hours) at 01/10/18 0926  Last data filed at 01/10/18 0900   Gross per 24 hour   Intake          4519.15 ml   Output             5604 ml   Net         -1084.85 ml      Telemetry Reviewed:     PHYSICAL EXAM:  General: Critically ill in the icu, on the vent  Rhonchi  +1 dependent edema  Soft, nt    Lab Data Reviewed: (see below)    Medications Reviewed: (see below)    PMH/SH reviewed - no change compared to H&P  ________________________________________________________________________  Care Plan discussed with:  Patient     Family      RN y    Care Manager                    Consultant:          Comments   >50% of visit spent in counseling and coordination of care       ________________________________________________________________________  Damaris Fuentes MD     Procedures: see electronic medical records for all procedures/Xrays and details which  were not copied into this note but were reviewed prior to creation of Plan. LABS:  Recent Labs      01/10/18   0425  01/09/18   0407   WBC  29.3*  28.7*   HGB  14.0  14.1   HCT  43.5  43.2   PLT  94*  115*     Recent Labs      01/10/18   0425  01/09/18   1923  01/09/18   1157  01/09/18   0407   NA  137  139  137  135*   K  4.6  4.5  5.0  5.5*   CL  100  100  99  97   CO2  30  32  31  30   BUN  64*  72*  82*  89*   CREA  2.44*  2.72*  2.99*  3.56*   GLU  165*  138*  190*  238*   CA  8.2*  8.0*  8.2*  7.7*   MG  2.1  2.1   --   2.3   PHOS  4.5  5.0*  5.6*  5.7*  6.5*     Recent Labs      01/10/18   0425  01/09/18   1923  01/09/18   1157  01/09/18   0407   01/08/18   0452   SGOT  52*   --    --   56*   --   56*   AP  88   --    --   70   --   56   TP  5.3*   --    --   5.0*   --   5.0*   ALB  1.3*  1.3*  1.3*  1.2*   < >  1.2*   GLOB  4.0   --    --   3.8   --   3.8    < > = values in this interval not displayed. Recent Labs      01/10/18   0425  01/09/18   1724  01/09/18   1157   APTT  50.2*  58.3*  62.2*      No results for input(s): FE, TIBC, PSAT, FERR in the last 72 hours. No results found for: FOL, RBCF   Recent Labs      01/10/18   0558  01/09/18   0450   PH  7.23*  7.26*   PCO2  84*  72*   PO2  64*  61*     No results for input(s): CPK, CKMB in the last 72 hours.     No lab exists for component: TROPONINI  No components found for: [place]  Lab Results   Component Value Date/Time    Color YELLOW/STRAW 01/07/2018 07:19 PM    Appearance TURBID 01/07/2018 07:19 PM    Specific gravity 1.021 01/07/2018 07:19 PM    pH (UA) 5.0 01/07/2018 07:19 PM    Protein 30 01/07/2018 07:19 PM    Glucose NEGATIVE  01/07/2018 07:19 PM    Ketone NEGATIVE  01/07/2018 07:19 PM    Bilirubin NEGATIVE  01/07/2018 07:19 PM    Urobilinogen 0.2 01/07/2018 07:19 PM    Nitrites NEGATIVE  01/07/2018 07:19 PM    Leukocyte Esterase SMALL 01/07/2018 07:19 PM    Epithelial cells FEW 01/07/2018 07:19 PM    Bacteria NEGATIVE  01/07/2018 07:19 PM    WBC 10-20 01/07/2018 07:19 PM    RBC 20-50 01/07/2018 07:19 PM       MEDICATIONS:  Current Facility-Administered Medications   Medication Dose Route Frequency    albuterol-ipratropium (DUO-NEB) 2.5 MG-0.5 MG/3 ML  3 mL Nebulization Q6H PRN    sodium bicarbonate (8.4%) 150 mEq in sterile water 1,000 mL infusion   IntraVENous CONTINUOUS    dexmedeTOMidine (PRECEDEX) 400 mcg in 0.9% sodium chloride 100 mL infusion  0.2-1.4 mcg/kg/hr IntraVENous TITRATE    heparin 25,000 units in D5W 250 ml infusion  9.3-25 Units/kg/hr IntraVENous TITRATE    polyethylene glycol (MIRALAX) packet 17 g  17 g Per NG tube DAILY    heparin (porcine) injection 4,000 Units  4,000 Units IntraVENous Q6H PRN    heparin (porcine) injection 2,000 Units  2,000 Units IntraVENous Q6H PRN    cefepime (MAXIPIME) 2 g in 0.9 %  mL IVPB  2 g IntraVENous Q12H    bicarbonate dialysis (PRISMASOL) BG K 2/Ca 3.5 5000 ml solution   Extracorporeal DIALYSIS CONTINUOUS    insulin glargine (LANTUS) injection 5 Units  5 Units SubCUTAneous DAILY    famotidine (PF) (PEPCID) 20 mg in sodium chloride 0.9 % 10 mL injection  20 mg IntraVENous Q24H    fluconazole (DIFLUCAN) 200mg/100 mL IVPB (premix)  200 mg IntraVENous DAILY    methylPREDNISolone (PF) (SOLU-MEDROL) injection 40 mg  40 mg IntraVENous Q6H    metoclopramide HCl (REGLAN) injection 5 mg  5 mg IntraVENous Q6H    NOREPINephrine (LEVOPHED) 32 mg in 5% dextrose 250 mL infusion  2-30 mcg/min IntraVENous TITRATE    fentaNYL (PF) 900 mcg/30 ml infusion soln  0-200 mcg/hr IntraVENous TITRATE    zinc oxide-cod liver oil (DESITIN) 40 % paste   Topical BID    epoprostenol (VELETRI) 30,000 ng/mL in 0.9% sodium chloride 50 mL inhalation solution  50 ng/kg/min (Ideal) Inhalation CONTINUOUS    0.9% sodium chloride inhalation  22.4 mL/hr Inhalation CONTINUOUS    amiodarone (CORDARONE) 900 mg/250 ml D5W infusion  0.5 mg/min IntraVENous TITRATE    sodium chloride (NS) flush 10-30 mL  10-30 mL InterCATHeter PRN    sodium chloride (NS) flush 10-40 mL  10-40 mL InterCATHeter Q8H    sodium chloride (NS) flush 20 mL  20 mL InterCATHeter Q24H    heparin (porcine) pf 300 Units  300 Units InterCATHeter PRN    insulin lispro (HUMALOG) injection   SubCUTAneous Q6H    ondansetron (ZOFRAN) injection 4 mg  4 mg IntraVENous Q4H PRN    labetalol (NORMODYNE;TRANDATE) injection 10 mg  10 mg IntraVENous Q2H PRN    sodium chloride (NS) flush 5-10 mL  5-10 mL IntraVENous PRN    fentaNYL citrate (PF) injection  mcg   mcg IntraVENous Q2H PRN    chlorhexidine (PERIDEX) 0.12 % mouthwash 15 mL  15 mL Oral Q12H    metroNIDAZOLE (FLAGYL) IVPB premix 500 mg  500 mg IntraVENous Q12H    glucose chewable tablet 16 g  4 Tab Oral PRN    dextrose (D50W) injection syrg 12.5-25 g  12.5-25 g IntraVENous PRN    glucagon (GLUCAGEN) injection 1 mg  1 mg IntraMUSCular PRN

## 2018-01-10 NOTE — PROGRESS NOTES
Called to patient bedside because of large air leak and low tidal volumes. Air was added to cuff with no success. ET tube was advanced to 25cm at lip and air was placed in. A new Holister was placed to secure ET tube better.  Bilateral breath sounds was heard and tidal volumes were normal.

## 2018-01-10 NOTE — DIABETES MGMT
DTC Progress Note    Recommendations/ Comments: Pt discussed with rounding team and Dr. Annalee Schroeder - adding HS dose of Lantus 5 units to address additional dextrose as noted below and reassess am.    Chart reviewed on Sheryl Dave during Multidisciplinary Rounds. Discussion with , Pharmacy and Nutrition related to add D5 solution for medication versus sterile water due to shortage and anticipating additional ~250 g dextrose to be delivered/24 hour period and anticipating hyperglycemia. Expecting this to begin ~ 1400 hours today. A1c:   Lab Results   Component Value Date/Time    Hemoglobin A1c 9.0 01/02/2018 04:12 AM     Recent Glucose Results:   Lab Results   Component Value Date/Time     (H) 01/10/2018 04:25 AM     (H) 01/09/2018 07:23 PM     (H) 01/09/2018 11:57 AM    GLUCPOC 179 (H) 01/10/2018 05:48 AM    GLUCPOC 152 (H) 01/09/2018 11:15 PM    GLUCPOC 142 (H) 01/09/2018 05:15 PM        Lab Results   Component Value Date/Time    Creatinine 2.44 01/10/2018 04:25 AM     Estimated Creatinine Clearance: 37.7 mL/min (based on Cr of 2.44). Active Orders   Diet    DIET NPO      PO intake: No data found. Will continue to follow as needed. Thank you.   Jerzy Marquez RD, CDE

## 2018-01-10 NOTE — PROGRESS NOTES
Pt seemed to be resting; no visitors. Spoke with NIMISHA Rodriguez. RN reported not much family visiting today.  remains available for support as needed. For Spiritual Care paging please call [phone]). Paige Mei M.Div.   Palliative  Fellow

## 2018-01-10 NOTE — PROGRESS NOTES
1900 Report received from David Arias RN.    3135 Precedex increased, pt restless, coughing against vent. 0220 Pt not pulling volumes on vent, large air leak. Added air to cuff with no success. RT in and advanced ETT, bilat breath sounds auscultated, volumes being pulled. Xray called for STAT cxr.     0700 Report given to David Arias RN.

## 2018-01-10 NOTE — PROGRESS NOTES
[facility]  Pulmonary, Critical Care, and Sleep Medicine    Name: Jenny Cortés MRN: [id number]   : 1960 Hospital: [facility]   Date: 1/10/2018        IMPRESSION:   · Acute/chronic hypoxic/hypercapnic respiratory failure  · Severe baseline ILD of undetermined etiology  · ARDS  · Acute renal failure  · Shock  · CHF - LVEF 37% on recent stress test at [facility]  · Afib  · DM  · RA  · Noncompliance - he has refused O2 and not taken many of the meds prescribe to him      PLAN:   · Ventilator support - unable to reach ARDS goals despite low TV ventilation - on maximal support - has very high Vd/Vt and unable to adequately exchange CO2 or O2  · On max dose nebulized epoprostenol - desaturated when dose reduced  · Increase bicarb drip again - baseline serum bicarb was 42, and now needs additional for \"permissive hypercapnia\"  · CRRT per nephrology  · Pressors  · Amiodarone per cardiology   · Broad spectrum empiric antibiotics - stop date in place  · Follow up procalcitonin  · Insulin   · Sedation - off propofol due to increasing triglycerides, on fentanyl and Precedex  · On heparin drip due to afib/chronic anticoagulation  · Patient requiring restraints due to threat or injury to self  · GI prophylaxis  · Palliative care evaluation ongoing. Prognosis very poor     Subjective/Interval History:   I have reviewed the flowsheet and previous days notes. The patient is unable to give any meaningful history or review of systems because the patient is:   Intubated/sedated     The patient is critically ill on:      Mechanical ventilation/pressors     Review of Systems   Unable to perform ROS: Intubated     Objective:   Vital Signs:    Visit Vitals    BP 97/73 (BP 1 Location: Left arm, BP Patient Position: At rest)    Pulse (!) 126    Temp 97.5 °F (36.4 °C)    Resp 26    Ht 5' 7\" (1.702 m)    Wt 102.8 kg (226 lb 10.1 oz)    SpO2 92%    BMI 35.5 kg/m2       O2 Device: Endotracheal tube, Ventilator   O2 Flow Rate (L/min): 15 l/min   Temp (24hrs), Av.9 °F (36.1 °C), Min:96.1 °F (35.6 °C), Max:97.5 °F (36.4 °C)       Intake/Output:   Last shift:      01/10 0701 - 01/10 1900  In: 213.3 [I.V.:163.3]  Out: 214   Last 3 shifts: 1901 - 01/10 0700  In: 6504.3 [I.V.:5814.3]  Out: 8006 [Urine:25]    Intake/Output Summary (Last 24 hours) at 01/10/18 0838  Last data filed at 01/10/18 08   Gross per 24 hour   Intake          4763.51 ml   Output             5679 ml   Net          -915.49 ml     Hemodynamics:   PAP:   CO:     Wedge:   CI:     CVP:  CVP (mmHg): 15 mmHg (01/10/18 0800) SVR:       PVR:       Ventilator Settings:  Mode Rate Tidal Volume Pressure FiO2 PEEP   Assist control   400 ml  16 cm H2O 100 % 18 cm H20     Peak airway pressure: 42 cm H2O    Minute ventilation: 13.5 l/min       Physical Exam   Constitutional: He appears ill. He is sedated and intubated. HENT:   Head: Normocephalic and atraumatic. Mouth/Throat: No oropharyngeal exudate. Eyes: No scleral icterus. Cardiovascular: Normal rate and regular rhythm. Pulmonary/Chest: He is intubated. He has no wheezes. He has rales. Abdominal: Soft. Bowel sounds are normal. He exhibits no distension. There is no tenderness. Musculoskeletal: He exhibits edema. Skin: Skin is warm and dry.      Data:     Current Facility-Administered Medications   Medication Dose Route Frequency    sodium bicarbonate (8.4%) 150 mEq in sterile water 1,000 mL infusion   IntraVENous CONTINUOUS    dexmedeTOMidine (PRECEDEX) 400 mcg in 0.9% sodium chloride 100 mL infusion  0.2-1.4 mcg/kg/hr IntraVENous TITRATE    heparin 25,000 units in D5W 250 ml infusion  9.3-25 Units/kg/hr IntraVENous TITRATE    polyethylene glycol (MIRALAX) packet 17 g  17 g Per NG tube DAILY    cefepime (MAXIPIME) 2 g in 0.9 %  mL IVPB  2 g IntraVENous Q12H    bicarbonate dialysis (PRISMASOL) BG K 2/Ca 3.5 5000 ml solution   Extracorporeal DIALYSIS CONTINUOUS    insulin glargine (LANTUS) injection 5 Units  5 Units SubCUTAneous DAILY    famotidine (PF) (PEPCID) 20 mg in sodium chloride 0.9 % 10 mL injection  20 mg IntraVENous Q24H    fluconazole (DIFLUCAN) 200mg/100 mL IVPB (premix)  200 mg IntraVENous DAILY    methylPREDNISolone (PF) (SOLU-MEDROL) injection 40 mg  40 mg IntraVENous Q6H    metoclopramide HCl (REGLAN) injection 5 mg  5 mg IntraVENous Q6H    NOREPINephrine (LEVOPHED) 32 mg in 5% dextrose 250 mL infusion  2-30 mcg/min IntraVENous TITRATE    fentaNYL (PF) 900 mcg/30 ml infusion soln  0-200 mcg/hr IntraVENous TITRATE    zinc oxide-cod liver oil (DESITIN) 40 % paste   Topical BID    epoprostenol (VELETRI) 30,000 ng/mL in 0.9% sodium chloride 50 mL inhalation solution  50 ng/kg/min (Ideal) Inhalation CONTINUOUS    0.9% sodium chloride inhalation  22.4 mL/hr Inhalation CONTINUOUS    amiodarone (CORDARONE) 900 mg/250 ml D5W infusion  0.5 mg/min IntraVENous TITRATE    sodium chloride (NS) flush 10-40 mL  10-40 mL InterCATHeter Q8H    sodium chloride (NS) flush 20 mL  20 mL InterCATHeter Q24H    insulin lispro (HUMALOG) injection   SubCUTAneous Q6H    chlorhexidine (PERIDEX) 0.12 % mouthwash 15 mL  15 mL Oral Q12H    metroNIDAZOLE (FLAGYL) IVPB premix 500 mg  500 mg IntraVENous Q12H                Labs:  Recent Labs      01/10/18   0425  01/09/18   0407  01/08/18   1143   WBC  29.3*  28.7*  23.5*   HGB  14.0  14.1  11.6*   HCT  43.5  43.2  36.2*   PLT  94*  115*  84*     Recent Labs      01/10/18   0425  01/09/18   1923  01/09/18   1157  01/09/18   0407   01/08/18   0452   NA  137  139  137  135*   < >  139   K  4.6  4.5  5.0  5.5*   < >  5.6*   CL  100  100  99  97   < >  95*   CO2  30  32  31  30   < >  34*   GLU  165*  138*  190*  238*   < >  189*   BUN  64*  72*  82*  89*   < >  124*   CREA  2.44*  2.72*  2.99*  3.56*   < >  4.76*   CA  8.2*  8.0*  8.2*  7.7*   < >  7.7*   MG  2.1  2.1   --   2.3   < >  2.5*   PHOS  4.5  5.0*  5.6* 5. 7*  6.5*   < >  7.8*   ALB  1.3*  1.3*  1.3*  1.2*   < >  1.2*   TBILI  0.4   --    --   0.9   --   0.5   SGOT  52*   --    --   56*   --   56*   ALT  25   --    --   29   --   29    < > = values in this interval not displayed.      Recent Labs      01/10/18   0558  01/09/18   0450   PH  7.23*  7.26*   PCO2  84*  72*   PO2  64*  61*   HCO3  34*  31*   FIO2  100  100     Imaging:  I have personally reviewed the patients radiographs and have reviewed the reports:  No change in diffuse bilateral infiltrates        Total critical care time exclusive of procedures: 30 minutes  Mary Alice Andrews MD

## 2018-01-10 NOTE — PROCEDURES
[facility]       [phone]    Orders   Mode: CVVHD   Factor: 50   UFR: 2000/ml/hr   Blood Flow Rate: 200     Metrics   BP / HR: 1100 99/58 p77   Blood Flow Rate: 200   AP:                         -89   RP: 103   TMP: 20   PD: 82   FP: 225   UFR: 2000ml/hr     Comments / Plan:      Upon assessment, CVVHD running without any issues. No need to change filter at this time. Rt IJ viviane present, dressing intact from 1/8/18, no signs of infection noted. All lines and connections secured, blood warmer at 37 degrees celcius. . Pt stable    Pre and port report given to nurse Alexandria Rebollar RN

## 2018-01-11 NOTE — CONSULTS
Palliative Medicine Consult  Call: [phone])    Patient Name: Derik Michele  YOB: 1960    Date of Initial Consult: 1/2/18  Reason for Consult: Psychosocial Distress  Requesting Provider: Reina Cheema   Primary Care Physician: PROVIDER UNKNOWN     SUMMARY:   Stephany Lennox is a 61 y.o. with a past history of severe COPD, CHF, HTN, DM, systolic HF with EF 19%, aortic valve replacement, CAD, and breathing difficulties for almost one year for which he is currently waiting for biopsy results, who was admitted on 1/1/2018 from home with a diagnosis of acute respiratory failure. Current medical issues leading to Palliative Medicine involvement include: acute hypoxic, hypercarbic respiratory failure suspect acute on chronic, followed by [place] pulmonologist since 2/2017 for chronic white out of left lung,over the past month was admitted for abx, has had 2 bronchoscopies, and is scheduled for a wedge resection of his left lung.  (see Ramandeep Ardon note 1/1/18 at [phone] for more details). Admitted to CCU intubated, on levophed for hypotension, amiodarone for rate control. ECMO was considered, however, family declined, made pt DNR, not to escalate care. Psychosocial: pt lives with his girlfriend of 16 years, Pauline Giraldo. Pt has a 33 y/o daughter from a previous marriage. Stefan Baeza and pt's good friend Kiah Rivera are pt's mPOAs. PALLIATIVE DIAGNOSES:   1. SOB: blood gas today PC02 82, P02 50, pH 7.24  2. Acute hypoxic hypercarbic respiratory failure: very difficult to ventilate, severe unresolved and now worsening atypical bilateral penumonia- not just white out of left lung but also  RLL and RMl involvement on post intubation CXR- asymmetric left >> right but almost pan lobar  3. Acute encephalopathy  4. afib with RVR: FLORIAN and cardioversion completed 1/2/18. Pro-BNP 4443  5. Abdominal distention, copious output  6. Shock: fluids and IV levophed  7.  Acute kidney injury: BUN/Cr 43/1.89  8. Care decisions:  Pt has an AMD on file     PLAN:   1. 11:30am: called to CCU, pt's HR low 30's. Called Cassandra Parekh to let her know pt is actively dying. She is on her way in.   2. 3:30pm: met with Tru Lujan,  and Dr. Lonita Frankel, updated on pt's overall condition, that his poor 02/C02 exchange, poor heart condition and renal failure has caught up with pt and his organs can no longer compensate and he is dying. Counseled family that our machines are not keeping him alive, rather, slowing down impending death. Discussed when family is ready, compassionate extubation. 3. Palliative  assisting with care to family. 4. Initial consult note routed to primary continuity provider  5. Communicated plan of care with: Palliative IDT, RN     GOALS OF CARE / TREATMENT PREFERENCES:     GOALS OF CARE:  Patient/Health Care Proxy Stated Goals: Cure  1. Compassionate w/d when family is ready    TREATMENT PREFERENCES:   Code Status: Partial Code    Advance Care Planning:  Advance Care Planning 1/1/2018   Patient's Healthcare Decision Maker is: Legal Next of Andrea 68   Primary Decision Maker Name Santana Michele   Primary Decision Maker Phone Number [phone]   Primary Decision Maker Relationship to Patient Friend   Confirm Advance Directive Yes, on file   Does the patient have other document types Power of        Medical Interventions: Limited additional interventions   Other Instructions: Other:    As far as possible, the palliative care team has discussed with patient / health care proxy about goals of care / treatment preferences for patient.            HISTORY:     History obtained from: chart, family    CHIEF COMPLAINT: worsening SOB    HPI/SUBJECTIVE:    The patient is:   [x] Verbal and participatory  [] Non-participatory due to:     BIBA for worsening SOB over the past few days; pt was en route to the [facility] but was diverted to [facility] for immediate care because [facility] was the closest hospital.  In February 2017 to have almost compete white out of his left lung. He was lost to follow-up however, over the past month was admitted for abx, has had 2 bronchoscopies, and is scheduled for a wedge resection of his left lung. Wife reports one culture grew ecoli, and also possible fungal infection. Pt recently had a stress test which he reports was normal, however, records indicate he had an abnormal stress test without reversible defect and an EF of 37%, which was up from one last year, which was 25-30%. Records also show pt was in aflutter 4:1 on 12/29. EMS report pt using his oxygen concentrator at 6, and did not have any 02 tanks because he refused them at time of discharge. Per wife, pt is in denial, tries to portray a picture of health and does not always go to follow-ups. In ED, pt was in distress, rales, tachycardic, hypoxic, accessory muscle use, speaking in few words. CXR showed extensive airspace disease throughout the left lung. He was placed on Bipap and then shortly thereafter was intubated due to decline. Copious output from OGT.     Clinical Pain Assessment (nonverbal scale for severity on nonverbal patients):   Clinical Pain Assessment  Severity: 0     Activity (Movement): Lying quietly, normal position    Duration: for how long has pt been experiencing pain (e.g., 2 days, 1 month, years)  Frequency: how often pain is an issue (e.g., several times per day, once every few days, constant)     FUNCTIONAL ASSESSMENT:     Palliative Performance Scale (PPS):  PPS: 10       PSYCHOSOCIAL/SPIRITUAL SCREENING:     Palliative IDT has assessed this patient for cultural preferences / practices and a referral made as appropriate to needs (Cultural Services, Patient Advocacy, Ethics, etc.)    Advance Care Planning:  Advance Care Planning 1/1/2018   Patient's Healthcare Decision Maker is: Legal Next of Andrea Epps   Primary Decision Maker Name [facility]   Primary Decision Maker Phone Number [phone]   Primary Decision Maker Relationship to Patient Friend   Confirm Advance Directive Yes, on file   Does the patient have other document types Power of        Any spiritual / Temple concerns:  [] Yes /  [x] No    Caregiver Burnout:  [] Yes /  [] No /  [x] No Caregiver Present      Anticipatory grief assessment:   [x] Normal  / [] Maladaptive       ESAS Anxiety: Anxiety: 0    ESAS Depression:   unable to assess due to pt factors       REVIEW OF SYSTEMS:     Positive and pertinent negative findings in ROS are noted above in HPI. The following systems were [] reviewed / [x] unable to be reviewed as noted in HPI  Other findings are noted below. Systems: constitutional, ears/nose/mouth/throat, respiratory, gastrointestinal, genitourinary, musculoskeletal, integumentary, neurologic, psychiatric, endocrine. Positive findings noted below. Modified ESAS Completed by: provider           Pain: 0   Anxiety: 0       Dyspnea: 0                    PHYSICAL EXAM:     From RN flowsheet:  Wt Readings from Last 3 Encounters:   01/11/18 224 lb 13.9 oz (102 kg)     Blood pressure 91/61, pulse 95, temperature 97.4 °F (36.3 °C), resp. rate 26, height 5' 7\" (1.702 m), weight 224 lb 13.9 oz (102 kg), SpO2 (!) 85 %.     Pain Scale 1: Behavioral Pain Scale (BPS)  Pain Intensity 1: 3                 Last bowel movement, if known:     Constitutional: intubated  Eyes: pupils equal, anicteric  ENMT: no nasal discharge, moist mucous membranes  Cardiovascular: regular rhythm, distal pulses intact  Respiratory: breathing not labored, symmetric  Gastrointestinal: soft non-tender, +bowel sounds  Musculoskeletal: no deformity, no tenderness to palpation  Skin: warm, dry  Neurologic: unable to assess due to pt factors  Psychiatric:unable to assess due to pt factors        HISTORY:     Principal Problem:    Acute respiratory failure ([address] (1/1/2018)    Active Problems:    Acute encephalopathy (1/1/2018)      Typical atrial flutter ([address] (1/1/2018)      Acute on chronic systolic HF (heart failure) ([address] (1/2/2018)      Acute renal insufficiency (1/2/2018)      SOB (shortness of breath) (1/2/2018)      Abdominal distention (1/2/2018)      Past Medical History:   Diagnosis Date    Acute on chronic systolic HF (heart failure) ([address] 1/2/2018      Past Surgical History:   Procedure Laterality Date    [facility]. BRONCH W THERAPEUTIC ASP  1/4/2018           Family History   Problem Relation Age of Onset    Hypertension Other       History reviewed, no pertinent family history.   Social History   Substance Use Topics    Smoking status: Not on file    Smokeless tobacco: Not on file    Alcohol use Not on file     No Known Allergies   Current Facility-Administered Medications   Medication Dose Route Frequency    [START ON 1/12/2018] insulin glargine (LANTUS) injection 20 Units  20 Units SubCUTAneous DAILY    insulin glargine (LANTUS) injection 15 Units  15 Units SubCUTAneous ONCE    sodium bicarbonate (8.4%) 150 mEq in dextrose 5% 1,000 mL infusion   IntraVENous CONTINUOUS    albuterol-ipratropium (DUO-NEB) 2.5 MG-0.5 MG/3 ML  3 mL Nebulization Q6H PRN    dexmedeTOMidine (PRECEDEX) 400 mcg in 0.9% sodium chloride 100 mL infusion  0.2-1.4 mcg/kg/hr IntraVENous TITRATE    heparin 25,000 units in D5W 250 ml infusion  9.3-25 Units/kg/hr IntraVENous TITRATE    polyethylene glycol (MIRALAX) packet 17 g  17 g Per NG tube DAILY    heparin (porcine) injection 4,000 Units  4,000 Units IntraVENous Q6H PRN    heparin (porcine) injection 2,000 Units  2,000 Units IntraVENous Q6H PRN    bicarbonate dialysis (PRISMASOL) BG K 2/Ca 3.5 5000 ml solution   Extracorporeal DIALYSIS CONTINUOUS    famotidine (PF) (PEPCID) 20 mg in sodium chloride 0.9 % 10 mL injection  20 mg IntraVENous Q24H    methylPREDNISolone (PF) (SOLU-MEDROL) injection 40 mg  40 mg IntraVENous Q6H    metoclopramide HCl (REGLAN) injection 5 mg  5 mg IntraVENous Q6H    NOREPINephrine (LEVOPHED) 32 mg in 5% dextrose 250 mL infusion  2-30 mcg/min IntraVENous TITRATE    fentaNYL (PF) 900 mcg/30 ml infusion soln  0-200 mcg/hr IntraVENous TITRATE    zinc oxide-cod liver oil (DESITIN) 40 % paste   Topical BID    epoprostenol (VELETRI) 30,000 ng/mL in 0.9% sodium chloride 50 mL inhalation solution  50 ng/kg/min (Ideal) Inhalation CONTINUOUS    0.9% sodium chloride inhalation  22.4 mL/hr Inhalation CONTINUOUS    amiodarone (CORDARONE) 900 mg/250 ml D5W infusion  0.5 mg/min IntraVENous TITRATE    sodium chloride (NS) flush 10-30 mL  10-30 mL InterCATHeter PRN    sodium chloride (NS) flush 10-40 mL  10-40 mL InterCATHeter Q8H    sodium chloride (NS) flush 20 mL  20 mL InterCATHeter Q24H    heparin (porcine) pf 300 Units  300 Units InterCATHeter PRN    insulin lispro (HUMALOG) injection   SubCUTAneous Q6H    ondansetron (ZOFRAN) injection 4 mg  4 mg IntraVENous Q4H PRN    labetalol (NORMODYNE;TRANDATE) injection 10 mg  10 mg IntraVENous Q2H PRN    sodium chloride (NS) flush 5-10 mL  5-10 mL IntraVENous PRN    fentaNYL citrate (PF) injection  mcg   mcg IntraVENous Q2H PRN    chlorhexidine (PERIDEX) 0.12 % mouthwash 15 mL  15 mL Oral Q12H    glucose chewable tablet 16 g  4 Tab Oral PRN    dextrose (D50W) injection syrg 12.5-25 g  12.5-25 g IntraVENous PRN    glucagon (GLUCAGEN) injection 1 mg  1 mg IntraMUSCular PRN          LAB AND IMAGING FINDINGS:     Lab Results   Component Value Date/Time    WBC 24.2 01/11/2018 04:05 AM    HGB 12.7 01/11/2018 04:05 AM    PLATELET 83 26/86/1819 04:05 AM     Lab Results   Component Value Date/Time    Sodium 136 01/11/2018 04:05 AM    Potassium 3.8 01/11/2018 04:05 AM    Chloride 97 01/11/2018 04:05 AM    CO2 35 01/11/2018 04:05 AM    BUN 49 01/11/2018 04:05 AM    Creatinine 1.90 01/11/2018 04:05 AM    Calcium 8.2 01/11/2018 04:05 AM    Magnesium 2.0 01/11/2018 04:05 AM    Phosphorus 3.2 01/11/2018 04:05 AM      Lab Results   Component Value Date/Time    AST (SGOT) 40 01/11/2018 04:05 AM    Alk. phosphatase 114 01/11/2018 04:05 AM    Protein, total 4.8 01/11/2018 04:05 AM    Albumin 1.3 01/11/2018 04:05 AM    Globulin 3.5 01/11/2018 04:05 AM     Lab Results   Component Value Date/Time    INR 1.2 01/06/2018 04:08 AM    Prothrombin time 12.0 01/06/2018 04:08 AM    aPTT 61.6 01/11/2018 04:05 AM      No results found for: IRON, FE, TIBC, IBCT, PSAT, FERR   Lab Results   Component Value Date/Time    pH 7.24 01/11/2018 05:20 AM    PCO2 82 01/11/2018 05:20 AM    PO2 50 01/11/2018 05:20 AM     No components found for: [place]   Lab Results   Component Value Date/Time    CK 34 01/02/2018 04:12 AM    CK - MB 1.4 01/02/2018 04:12 AM                Total time: 75 min  Counseling / coordination time, spent as noted above: 65 min  > 50% counseling / coordination?: yes    Prolonged service was provided for  []30 min   []75 min in face to face time in the presence of the patient, spent as noted above. Time Start:   Time End:   Note: this can only be billed with 38105 (initial) or 36738 (follow up). If multiple start / stop times, list each separately.

## 2018-01-11 NOTE — ROUTINE PROCESS
Called to pronounce patient. No response to noxious stimuli. No spontaneous breath sounds nor cardiac sounds. Pupils fixed and dilated. TOD: 1805. Family present and grieving appropriately.    D/C Summary to be dictated by Attending MD.    ________________________________________________________________________  Violet Maria MD

## 2018-01-11 NOTE — PROGRESS NOTES
Responded to RN Lexy's notification about pt's death. Provided support to family. Provided listening presence as they shared more stories. Melvin Gutierrez M.Div.   Palliative  Fellow

## 2018-01-11 NOTE — PROGRESS NOTES
Responded to heads up that family had arrived after pt having marked changes in his status. Bladimir Cunningham (life partner) and Stephon Cruz (sister) were at bedside. Provided listening presence as Clary and then Stephon Cruz shared how they felt about what was going on. Bladimir Cunningham went out to speak with a newly arrived friend, Kiara Sosa, and Stephon Cruz remained sharing more family history. Stephon Cruz shared her own understanding of what was going on and that if this is his (pt's) time, she is ready for that. Stephon Cruz shared how pt had been much more awake and alert yesterday and how disappointed they were that pt seems to have taken a turn for the worse. Stephon Cruz brought attention to pt making gentle up and down movements and wondered if pt was in pain.  noted the low O2 saturation and informed the RN Reyna Jerome who shared that for this pt that was actually pretty good.  shared this information with family and stepped away as they visited with Kiara Sosa who was encouraging their taking in some food.  reminded them of  availability. For Spiritual Care paging please call [address] [place] [phone]). Christal Stewart M.Div.   Palliative  Fellow

## 2018-01-11 NOTE — PROGRESS NOTES
Nutrition Assessment:    RECOMMENDATIONS:   Change TF formula and rate:   Change to TwoCal HN @ 20mL/h + 2 packs of Prosource BID + 50mL H2O flush q 6h (provides 1200kcals/100gPro/536mL)     Consider adding MVI    ASSESSMENT:   Chart reviewed, case discussed during CCU rounds. Pt remains intubated and sedated on precedex and fentanyl, needs re-estimated. Pt remains on CVVHD. Pt on bicarb, base was change to D5 yesterday, at current rate this provides an additional 816 kcals daily. Discussed with Dr. Jarrell Cunningham, as electrolytes have come down nicely, will change to standard volume restricted formula with multiple protein modulars as not to overfeed kcals on the vent. BG up to 300's, insulin increased per DTC recommendations. Still no BM, per recommendations pt received a dulcolax suppository yesterday and continues on daily miralax. If no BM by tomorrow, will recommend addition to bowel regimen. TF + dextrose will meet 99% kcal and 100% protein needs. Given <0.5L of TF, will recommend addition of a MVI to ensure micronutrient needs are met. Dietitians Intervention(s)/Plan(s): Change TF formula and rate, monitor for BM (continue bowel regimen), monitor electrolytes  SUBJECTIVE/OBJECTIVE:   Pt intubated and sedated   Diet Order: NPO, Other (comment) (TF via NGT: Nepro @ 40mL/h + Pro BID + 50mL flush q 6h (provides 1848kcals/108gPro/898mL) )  % Eaten:  No data found. Nepro  at 40 mL/hr flush with 50 mL  Q6H  via NG Tube   Residuals: 125 mL    Pertinent Medications:pepcid, reglan, lantus, humalog, miralax; IVF(D5, AJAZY2@ 200mL/h); Drips: fentanyl, precedex.     Chemistries:  Lab Results   Component Value Date/Time    Sodium 136 01/11/2018 04:05 AM    Potassium 3.8 01/11/2018 04:05 AM    Chloride 97 01/11/2018 04:05 AM    CO2 35 01/11/2018 04:05 AM    Anion gap 4 01/11/2018 04:05 AM    Glucose 278 01/11/2018 04:05 AM    BUN 49 01/11/2018 04:05 AM    Creatinine 1.90 01/11/2018 04:05 AM    BUN/Creatinine ratio 26 01/11/2018 04:05 AM    GFR est AA 44 01/11/2018 04:05 AM    GFR est non-AA 37 01/11/2018 04:05 AM    Calcium 8.2 01/11/2018 04:05 AM    Albumin 1.3 01/11/2018 04:05 AM      Anthropometrics: Height: 5' 7\" (170.2 cm) Weight: 102 kg (224 lb 13.9 oz)   [x]bed scale (1/11)   []stated   []unknown     IBW (%IBW): 67.3 kg (148 lb 5.9 oz) ( ) UBW (%UBW):   (  %)    BMI: Body mass index is 35.22 kg/(m^2). This BMI is indicative of:  []Underweight   []Normal   []Overweight   [x] Obesity   [] Extreme Obesity (BMI>40)  Estimated Nutrition Needs (Based on): 2046 Kcals/day (PSU (MSJ 0296)) , 100 g (1.5gPro/kg IBW) Protein  Carbohydrate: At Least 130 g/day  Fluids: 1200 mL/day or per MD     Last BM: 12/31   []Active     []Hyperactive  [x]Hypoactive       [] Absent   BS  Skin:    [] Intact   [] Incision  [] Breakdown   [] DTI   [x] Tears/Excoriation/Abrasion  [x]Edema(anasarca; +3 weeping-BUE; +2 pitting-BLE) [] Other: Wt Readings from Last 30 Encounters:   01/11/18 102 kg (224 lb 13.9 oz)      NUTRITION DIAGNOSES:   Problem:  Inadequate protein-energy intake      Etiology: related to pt NPO 2' vent      Signs/Symptoms: as evidenced by NPO + propofol meets <25% kcal and 0% protein needs. Previous dx re: inadequate protein energy intake resolved, TF will meet 100% kcal and protein needs. NUTRITION INTERVENTIONS:    Enteral/Parenteral Nutrition: Modify rate, concentration, composition, and schedule     Nutrition-Related Medication Mgmt: Other (comment)          GOAL:   Pt will tolerate TF at goal rate with residuals <250mL, K, Phos WNL and a BM in 2-4 days.      NUTRITION MONITORING AND EVALUATION   Previous Goal: Pt will tolerate TF initiation with residuals <250mL and K, Phos WNL in 2-4 days   Previous Goal Met: Yes   Previous Recommendations Implemented: Yes   Cultural, Adventism, or Ethnic Dietary Needs: None   LEARNING NEEDS (Diet, Food/Nutrient-Drug Interaction):    [x] None Identified   [] Identified and Education Provided/Documented   [] Identified and Pt declined/was not appropriate      [x] Interdisciplinary Care Plan Reviewed/Documented    [x] Participated in Discharge Planning: Unable to determine    [x] Interdisciplinary Rounds     NUTRITION RISK:    [x] High              [] Moderate           []  Low  []  Minimal/Uncompromised      Mirela Thibodeaux RD, [address]  Pager [phone]  Weekend Pager [phone]

## 2018-01-11 NOTE — DISCHARGE SUMMARY
Hospitalist Discharge Summary     Patient ID:  Robin Wade  [id number]  61 y.o.  1960    PCP on record: PROVIDER UNKNOWN    Admit date: 1/1/2018  Date and time of death: 1/11/2018 at [address]:  Shock and acute on chronic combined hypoxic/hypercarbic respiratory failure due to atypical bilateral PNA/possible ARDS in setting of chronic left lobar pneumonic process  DEISI  Atrial fibrillation/flutter and acute on chronic systolic HF  Acute thrombocytopenia  DM2 with steroid-induced hyperglycemia  Possible RA  Medical noncompliance  Obesity (Body mass index is 35.22 kg/(m^2)      CONSULTATIONS:  IP CONSULT TO INTENSIVIST  IP CONSULT TO PALLIATIVE CARE - PROVIDER  IP CONSULT TO NEPHROLOGY  IP CONSULT TO CARDIOLOGY    Excerpted HPI from H&P of Suzanne Barlow MD:  Isabel Ortiz is a 61 y.o.  male with known history as listed below presents to ED with complaint noted above. Available records were reviewed at the time of H&P. Patient with unclear PMH given no records in our system. Per review of ED records and discussion with ED MD given patient is unconscious and likely near intubation and no family/loved ones in room. Records from [place] being reviewed and in brief he is following pulmonary for his \"chronic\" white out of left lung since 2/2017. He has undergone multiple bronchoscopies and antibiotics for this without improvement  -I do not have results of these tests at this time. Girlfriend mentioned to ED MD that the patient has not been taking oxygen, nor medications, nor follow up as he is supposed to and he attempts to portray a \"picture of health\". Per review of records he had aflutter 4:1 on 12/29. EF per records up to 37% from 25% prior. He presented to ED [facility] for SOB. In ED initially with tachypnea and was placed on VM.  There was concern that this respiratory picture could represent HAP (recently at [facility]) and therefore started on HAP abx and \"sepsis\" protocol. No fluid bolus due to known CHF ef 25-30%. Unfortunately continued to worsen with increased work of breathing, increased abdominal distension. Repeat ABG showed hypercapnia and pH 7.0 - he was intubated. Copious output from OGT. We were asked to admit for work up and evaluation of the above problems. ______________________________________________________________________  DISCHARGE SUMMARY/HOSPITAL COURSE:  for full details see H&P, daily progress notes, labs, consult notes. Hospital course:  Shock and acute on chronic combined hypoxic/hypercarbic respiratory failure due to atypical bilateral PNA/possible ARDS in setting of chronic left lobar pneumonic process: per notes had bronchoscopy at the [facility] with E coli treated. CXR continued to show pulmonary edema/ARDS throughout his hospital course. Blood cultures 1/1 no growth.  Sputum 1/1 rare yeast.  Repeat sputum 1/4 2+ WBC but no growth. Pt was treated with Abx (had been on cefepime, diflucan, flagyl most recently, also previously on vancomycin) with no response. He was maintained on maximal ventilator support including veletri without response. Significant decline overnight was observed on the evening prior to his death with worsening oxygenation. Low HR then tachycardia developed. Dialysis stopped due to hemodynamic instability. A meeting was held with palliative care, pulmonology, myself and his decision makers and decision was made to transition to comfort care. DEISI: presumed ATN due to sepsis/shock; very little urine output. CVVHD initiated on 1/8. UA without bacteria. +small protein, LE, WBC and RBCs. CVVHD stopped due to instability as above. Atrial fibrillation/flutter and acute on chronic systolic HF: s/p cardioversion 1/3, currently in atrial flutter.  LVEF 37% on recent stress test at [facility]. Pt had echo 1/2 with EF 45-50%. There were no regional wall motion abnormalities.   He was treated with amiodarone gtt, lopressor, eliquis while here. Acute thrombocytopenia: multifactorial including sepsis, remains low but stable   DM2 with steroid-induced hyperglycemia:  HgA1c 9.0. Possible RA: reported by GF, has recently been on steroids  Medical noncompliance: GF noted he refused some medications and declined home O2, unclear what he was taking PTA  Obesity (Body mass index is 35.22 kg/(m^2)    _______________________________________________________________________  Patient seen and examined by me on discharge day. Pertinent Findings:  See my progress note from earlier today for my last exam with this patient.  _______________________________________________________________________  DISCHARGE MEDICATIONS:   There are no discharge medications for this patient. My Recommended Diet, Activity, Wound Care, and follow-up labs are listed in the patient's Discharge Insturctions which I have personally completed and reviewed.     ______________________________________________________________________    Risk of deterioration:     Condition at Discharge:  Stable  ______________________________________________________________________    Disposition    ______________________________________________________________________    Care Plan discussed with:   Patient, Family, RN, Consultant    ______________________________________________________________________    Code Status:   ______________________________________________________________________      Follow up with:   PCP : PROVIDER UNKNOWN  Follow-up Information     Follow up With Details Comments Contact Info    Clarita Nichols  [address] [address]  Phone: [phone]  [facility] [address]              Total time in minutes spent coordinating this discharge (includes going over instructions, follow-up, prescriptions, and preparing report for sign off to her PCP) :  10 minutes    Signed:  Violet Maria MD

## 2018-01-11 NOTE — PROGRESS NOTES
01/11/18 0646   ABCDEF Bundle   SBT Safety Screen Passed No   SBT Screen Reason for Failure FiO2 > 50%;PEEP > 7.5

## 2018-01-11 NOTE — PROGRESS NOTES
1900 Report received from Xuan Boothe RN.    0300 Negative access pressures alarming, repositioned lines with no success. Lines flushed and still no success so lines reversed & alarms resolved. 0500 Pt given prn 100mcg fentanyl IV push d/t pt being restless, shifting back & forth in bed, HR & BP increased, precedex also increased. 0630 Pt with very labile BPs through the night, levo titrated off & on-see MAR.     0700 Report given to Clemencia Mccurdy RN.

## 2018-01-11 NOTE — PROGRESS NOTES
End of life support. RN Amado Pickens and Dr. Mervat Garrido explained the process and removed the IVs explaining they will return a little later for extubation. Facilitated memory sharing. Provided prayer at bedside with Susannah Simpson and Elizabeth. Provided listening presence as family shared more stories around bedside. Escorted family members back and forth to pt room. No other family members to arrive. Stepped out to give family time for more visiting. For Spiritual Care paging please call [phone]). Eligio Harper M.Div.   Palliative  Fellow

## 2018-01-11 NOTE — PROGRESS NOTES
Responded to request from Palliative NP Mandie to follow up with family planning a compassionate extubation. Tate Newton, and [place] were in the room. RN Wilfred Pal entered shortly after to tend to pt's IVs.  Family shared their experience of having such a high yesterday thinking pt was doing so much better and then realizing this evening that they had done all they could.  stepped out briefly to respond to another page, with assurance that  will return. For Spiritual Care paging please call [phone]). Derrick Cali M.Div.   Palliative  Fellow

## 2018-01-11 NOTE — PROGRESS NOTES
Hospitalist Progress Note    NAME: Tee Campoverde   :  1960   MRN:  [id number]       Assessment / Plan:  Shock and acute on chronic combined hypoxic/hypercarbic respiratory failure due to atypical bilateral PNA/possible ARDS in setting of chronic left lobar pneumonic process: per notes had bronchoscopy at the [facility] with E coli treated. Significant decline overnight with worsening oxygenation. Low HR now tachycardic. Dialysis stopped due to hemodynamic instability.  - CXR today with no significant change  - blood cultures  no growth.  Sputum  rare yeast.  Repeat sputum  2+ WBC but no growth. - family meeting held with myself, palliative, pulmonology. Pt's decision makers have chosen comfort care given grim prognosis. Do not feel he has chance of recovery at this point.  - Abx stopped (has been on cefepime, diflucan, flagyl most recently)  - orders changed to reflect comfort care  DEISI: presumed ATN due to sepsis/shock; very little urine output. CVVHD initiated on . - UA without bacteria. +small protein, LE, WBC and RBCs  - appreciate nephrology assistance  - CVVHD stopped as above  Atrial fibrillation/flutter and acute on chronic systolic HF: s/p cardioversion 1/3, currently in atrial flutter.  LVEF 37% on recent stress test at [facility]. - echo  with EF 45-50%. There were no regional wall motion abnormalities. - d/c amiodarone gtt, lopressor, eliquis  Acute thrombocytopenia: multifactorial including sepsis, remains low but stable   DM2 with steroid-induced hyperglycemia:  HgA1c 9.0. Con't lantus, lispro sliding scale.   Possible RA: reported by GF, has recently been on steroids  Medical noncompliance: GF noted he refused some medications and declined home O2, unclear what he was taking PTA  Obesity (Body mass index is 35.22 kg/(m^2)      Code Status: partial (wife is medical decision maker)  DVT Prophylaxis: eliquis     Subjective:     Chief Complaint / Reason for Physician Visit  Intubated, not responsive. Discussed with RN events overnight. Review of Systems:  Symptom Y/N Comments  Symptom Y/N Comments   Fever/Chills    Chest Pain     Poor Appetite    Edema     Cough    Abdominal Pain     Sputum    Joint Pain     SOB/DOBSON    Pruritis/Rash     Nausea/vomit    Tolerating PT/OT     Diarrhea    Tolerating Diet     Constipation    Other       Could NOT obtain due to: nonresponsive     Objective:     VITALS:   Last 24hrs VS reviewed since prior progress note.  Most recent are:  Patient Vitals for the past 24 hrs:   Temp Pulse Resp BP SpO2   01/11/18 1153 - 68 30 - (!) 81 %   01/11/18 1122 - 95 - - -   01/11/18 1100 - (!) 116 26 91/61 (!) 85 %   01/11/18 1000 - (!) 114 30 95/59 (!) 86 %   01/11/18 0930 - (!) 123 29 95/66 (!) 87 %   01/11/18 0900 - (!) 130 (!) 31 120/75 (!) 86 %   01/11/18 0800 97.4 °F (36.3 °C) 84 30 116/56 (!) 88 %   01/11/18 0739 - 84 (!) 32 - (!) 87 %   01/11/18 0700 - 86 26 124/56 (!) 86 %   01/11/18 0630 - 89 28 137/67 (!) 85 %   01/11/18 0600 - 89 28 132/62 (!) 83 %   01/11/18 0530 - 92 28 126/63 (!) 82 %   01/11/18 0504 - (!) 118 (!) 33 - (!) 86 %   01/11/18 0500 - (!) 114 25 167/74 (!) 85 %   01/11/18 0430 - (!) 112 27 167/79 (!) 88 %   01/11/18 0400 96.6 °F (35.9 °C) 65 28 96/51 96 %   01/11/18 0330 - 64 30 115/57 96 %   01/11/18 0300 - 60 28 104/50 95 %   01/11/18 0230 - 86 30 111/72 96 %   01/11/18 0200 - 84 30 106/54 94 %   01/11/18 0130 - 86 30 (!) 72/40 91 %   01/11/18 0110 - 87 29 (!) 70/37 90 %   01/11/18 0102 - 90 28 (!) 64/32 91 %   01/11/18 0100 - 89 30 (!) 54/35 91 %   01/11/18 0030 - (!) 108 26 105/60 90 %   01/11/18 0023 - (!) 106 30 - 90 %   01/11/18 0000 97.5 °F (36.4 °C) (!) 109 26 124/67 (!) 89 %   01/10/18 2330 - (!) 107 29 113/66 91 %   01/10/18 2300 - (!) 105 29 117/59 92 %   01/10/18 2230 - (!) 101 27 106/64 92 %   01/10/18 2200 - (!) 106 30 111/63 93 %   01/10/18 2130 - 97 29 (!) 69/40 93 %   01/10/18 2100 - (!) 104 29 108/72 91 % 01/10/18 2049 - (!) 114 (!) 31 - 91 %   01/10/18 2030 - (!) 106 29 110/66 (!) 89 %   01/10/18 2013 97.4 °F (36.3 °C) (!) 104 29 117/65 90 %   01/10/18 2001 - 98 13 (!) 71/45 (!) 89 %   01/10/18 2000 - 100 13 (!) 77/45 (!) 89 %   01/10/18 1900 - (!) 111 23 123/71 90 %   01/10/18 1830 - (!) 109 23 113/58 -   01/10/18 1800 - (!) 105 27 114/60 93 %   01/10/18 1730 - (!) 106 25 110/73 91 %   01/10/18 1700 - (!) 104 25 106/61 (!) 88 %   01/10/18 1630 - (!) 112 26 132/61 -   01/10/18 1606 - (!) 110 30 - 92 %   01/10/18 1600 97.5 °F (36.4 °C) (!) 109 26 108/65 91 %   01/10/18 1530 - (!) 106 23 110/63 91 %   01/10/18 1500 - 99 23 (!) 86/52 91 %   01/10/18 1430 - (!) 102 20 106/57 91 %   01/10/18 1400 - (!) 109 26 118/69 90 %   01/10/18 1330 - (!) 111 24 121/78 90 %   01/10/18 1300 - (!) 109 27 114/66 91 %   01/10/18 1230 97.5 °F (36.4 °C) (!) 105 19 129/82 (!) 81 %       Intake/Output Summary (Last 24 hours) at 01/11/18 1219  Last data filed at 01/11/18 1100   Gross per 24 hour   Intake          6400.65 ml   Output             7398 ml   Net          -997.35 ml        PHYSICAL EXAM:  General: WD, WN. Not alert, not able to be cooperative, moderate acute distress    EENT:  EOM not intact. Anicteric sclerae. MMM, ET tube in place  Resp:  CTA bilaterally, no wheezing or rales. No accessory muscle use  CV:  Regular  rhythm,  No edema  GI:  Soft, Non distended, Non tender.  +Bowel sounds  Neurologic:  Oriented X 0, no speech,   Psych:   No insight. Not anxious nor agitated  Skin:  No rashes.   No jaundice    Reviewed most current lab test results and cultures  YES  Reviewed most current radiology test results   YES  Review and summation of old records today    NO  Reviewed patient's current orders and MAR    YES  PMH/ reviewed - no change compared to H&P  ________________________________________________________________________  Care Plan discussed with:    Comments   Patient x    Family  x Decision makers at bedside   RN x Care Manager     Consultant  x Pulm, palliative, cards                     Multidiciplinary team rounds were held today with , nursing, pharmacist and clinical coordinator. Patient's plan of care was discussed; medications were reviewed and discharge planning was addressed. ________________________________________________________________________  Total NON critical care TIME:  30 Minutes    Total CRITICAL CARE TIME Spent:   Minutes non procedure based      Comments   >50% of visit spent in counseling and coordination of care x    ________________________________________________________________________  Meño wOens MD     Procedures: see electronic medical records for all procedures/Xrays and details which were not copied into this note but were reviewed prior to creation of Plan. LABS:  I reviewed today's most current labs and imaging studies. Pertinent labs include:  Recent Labs      01/11/18   0405  01/10/18   0425  01/09/18   0407   WBC  24.2*  29.3*  28.7*   HGB  12.7  14.0  14.1   HCT  39.8  43.5  43.2   PLT  83*  94*  115*     Recent Labs      01/11/18   0405  01/10/18   1905  01/10/18   1153  01/10/18   0425   01/09/18   0407   NA  136  137  137  137   < >  135*   K  3.8  4.2  4.4  4.6   < >  5.5*   CL  97  97  98  100   < >  97   CO2  35*  33*  34*  30   < >  30   GLU  278*  290*  187*  165*   < >  238*   BUN  49*  54*  55*  64*   < >  89*   CREA  1.90*  2.14*  2.17*  2.44*   < >  3.56*   CA  8.2*  8.3*  8.1*  8.2*   < >  7.7*   MG  2.0  2.0   --   2.1   < >  2.3   PHOS  3.2  4.7  4.2  4.5   < >  6.5*   ALB  1.3*  1.4*  1.4*  1.3*   < >  1.2*   TBILI  0.5   --    --   0.4   --   0.9   SGOT  40*   --    --   52*   --   56*   ALT  25   --    --   25   --   29    < > = values in this interval not displayed.        Signed: Meño Owens MD

## 2018-01-11 NOTE — DIABETES MGMT
DTC Progress Note    Recommendations/ Comments: Pt discussed with rounding team and Dr. Kaci Oakley. Plan to increase lantus to 20units daily, give 15units now to equal 20units today. BG now elevated on bicarbD5 gtt @200ml/hr. TF rate and/or formulary to be adjusted secondary to D5 IVF. Chart reviewed on Alex Michele during Multidisciplinary Rounds. A1c:   Lab Results   Component Value Date/Time    Hemoglobin A1c 9.0 01/02/2018 04:12 AM           Recent Glucose Results:   Lab Results   Component Value Date/Time     (H) 01/11/2018 04:05 AM     (H) 01/10/2018 07:05 PM     (H) 01/10/2018 11:53 AM    GLUCPOC 233 (H) 01/11/2018 05:23 AM    GLUCPOC 312 (H) 01/10/2018 11:17 PM    GLUCPOC 195 (H) 01/10/2018 05:29 PM        Lab Results   Component Value Date/Time    Creatinine 1.90 01/11/2018 04:05 AM     Estimated Creatinine Clearance: 48.3 mL/min (based on Cr of 1.9). Active Orders   Diet    DIET NPO        PO intake: No data found. Will continue to follow as needed. Thank you.   EMANUEL TamayoN, RN, [address]

## 2018-01-11 NOTE — PROGRESS NOTES
[facility]  Pulmonary, Critical Care, and Sleep Medicine    Name: Luis Manuel Lorenzo MRN: [id number]   : 1960 Hospital: [facility]   Date: 2018        IMPRESSION:   · Acute/chronic hypoxic/hypercapnic respiratory failure  · Severe baseline ILD of undetermined etiology  · ARDS  · Acute renal failure  · Shock  · CHF - LVEF 37% on recent stress test at [facility]  · Afib  · DM  · RA  · Noncompliance - he has refused O2 and not taken many of the meds prescribe to him      PLAN:   · Ventilator support - unable to reach ARDS goals despite low TV ventilation - on maximal support - has very high Vd/Vt and unable to adequately exchange CO2 or O2 - oxygenation worsening  · On max dose nebulized epoprostenol - desaturated when dose reduced  · Continue bicarb drip - baseline serum bicarb was 42, and now needs additional for \"permissive hypercapnia\"  · CRRT per nephrology  · Pressors  · Amiodarone per cardiology   · Broad spectrum empiric antibiotics - stop date in place  · Follow up procalcitonin  · Insulin   · Sedation - off propofol due to increasing triglycerides, on fentanyl and Precedex  · On heparin drip due to afib/chronic anticoagulation  · Patient requiring restraints due to threat or injury to self  · GI prophylaxis  · Palliative care evaluation ongoing. Prognosis very poor and worsening daily     Subjective/Interval History:   I have reviewed the flowsheet and previous days notes. The patient is unable to give any meaningful history or review of systems because the patient is:   Intubated/sedated     The patient is critically ill on:      Mechanical ventilation/pressors     Review of Systems   Unable to perform ROS: Intubated     Objective:   Vital Signs:    Visit Vitals    /75    Pulse (!) 130    Temp 97.4 °F (36.3 °C)    Resp (!) 31    Ht 5' 7\" (1.702 m)    Wt 102 kg (224 lb 13.9 oz)    SpO2 (!) 86%    BMI 35.22 kg/m2       O2 Device: Endotracheal tube, Ventilator   O2 Flow Rate (L/min): 15 l/min   Temp (24hrs), Av.3 °F (36.3 °C), Min:96.6 °F (35.9 °C), Max:97.5 °F (36.4 °C)       Intake/Output:   Last shift:      701 - 1900  In: 573.2 [I.V.:493.2]  Out: 607   Last 3 shifts: 1901 - 700  In: 8186.3 [I.V.:6756.3]  Out: 56939     Intake/Output Summary (Last 24 hours) at 18  Last data filed at 18   Gross per 24 hour   Intake          5903.02 ml   Output             7949 ml   Net         -2045.98 ml     Hemodynamics:   PAP:   CO:     Wedge:   CI:     CVP:  CVP (mmHg): 14 mmHg (18) SVR:       PVR:       Ventilator Settings:  Mode Rate Tidal Volume Pressure FiO2 PEEP   Assist control   400 ml  16 cm H2O 100 % 18 cm H20     Peak airway pressure: 40 cm H2O    Minute ventilation: 14.6 l/min       Physical Exam   Constitutional: He appears ill. He is sedated and intubated. HENT:   Head: Normocephalic and atraumatic. Mouth/Throat: No oropharyngeal exudate. Eyes: No scleral icterus. Cardiovascular: Normal rate and regular rhythm. Pulmonary/Chest: He is intubated. He has no wheezes. He has rales. Abdominal: Soft. Bowel sounds are normal. He exhibits no distension. There is no tenderness. Musculoskeletal: He exhibits edema. Skin: Skin is warm and dry.      Data:     Current Facility-Administered Medications   Medication Dose Route Frequency    sodium bicarbonate (8.4%) 150 mEq in dextrose 5% 1,000 mL infusion   IntraVENous CONTINUOUS    insulin glargine (LANTUS) injection 5 Units  5 Units SubCUTAneous BID    dexmedeTOMidine (PRECEDEX) 400 mcg in 0.9% sodium chloride 100 mL infusion  0.2-1.4 mcg/kg/hr IntraVENous TITRATE    heparin 25,000 units in D5W 250 ml infusion  9.3-25 Units/kg/hr IntraVENous TITRATE    polyethylene glycol (MIRALAX) packet 17 g  17 g Per NG tube DAILY    bicarbonate dialysis (PRISMASOL) BG K 2/Ca 3.5 5000 ml solution   Extracorporeal DIALYSIS CONTINUOUS    famotidine (PF) (PEPCID) 20 mg in sodium chloride 0.9 % 10 mL injection  20 mg IntraVENous Q24H    methylPREDNISolone (PF) (SOLU-MEDROL) injection 40 mg  40 mg IntraVENous Q6H    metoclopramide HCl (REGLAN) injection 5 mg  5 mg IntraVENous Q6H    NOREPINephrine (LEVOPHED) 32 mg in 5% dextrose 250 mL infusion  2-30 mcg/min IntraVENous TITRATE    fentaNYL (PF) 900 mcg/30 ml infusion soln  0-200 mcg/hr IntraVENous TITRATE    zinc oxide-cod liver oil (DESITIN) 40 % paste   Topical BID    epoprostenol (VELETRI) 30,000 ng/mL in 0.9% sodium chloride 50 mL inhalation solution  50 ng/kg/min (Ideal) Inhalation CONTINUOUS    0.9% sodium chloride inhalation  22.4 mL/hr Inhalation CONTINUOUS    amiodarone (CORDARONE) 900 mg/250 ml D5W infusion  0.5 mg/min IntraVENous TITRATE    sodium chloride (NS) flush 10-40 mL  10-40 mL InterCATHeter Q8H    sodium chloride (NS) flush 20 mL  20 mL InterCATHeter Q24H    insulin lispro (HUMALOG) injection   SubCUTAneous Q6H    chlorhexidine (PERIDEX) 0.12 % mouthwash 15 mL  15 mL Oral Q12H                Labs:  Recent Labs      01/11/18   0405  01/10/18   0425  01/09/18   0407   WBC  24.2*  29.3*  28.7*   HGB  12.7  14.0  14.1   HCT  39.8  43.5  43.2   PLT  83*  94*  115*     Recent Labs      01/11/18   0405  01/10/18   1905  01/10/18   1153  01/10/18   0425   01/09/18   0407   NA  136  137  137  137   < >  135*   K  3.8  4.2  4.4  4.6   < >  5.5*   CL  97  97  98  100   < >  97   CO2  35*  33*  34*  30   < >  30   GLU  278*  290*  187*  165*   < >  238*   BUN  49*  54*  55*  64*   < >  89*   CREA  1.90*  2.14*  2.17*  2.44*   < >  3.56*   CA  8.2*  8.3*  8.1*  8.2*   < >  7.7*   MG  2.0  2.0   --   2.1   < >  2.3   PHOS  3.2  4.7  4.2  4.5   < >  6.5*   ALB  1.3*  1.4*  1.4*  1.3*   < >  1.2*   TBILI  0.5   --    --   0.4   --   0.9   SGOT  40*   --    --   52*   --   56*   ALT  25   --    --   25   --   29    < > = values in this interval not displayed.      Recent Labs      01/11/18   0558 01/10/18   0558  01/09/18   0450   PH  7.24*  7.23*  7.26*   PCO2  82*  84*  72*   PO2  50*  64*  61*   HCO3  34*  34*  31*   FIO2  100  100  100     Imaging:  I have personally reviewed the patients radiographs and have reviewed the reports:  No change in diffuse bilateral infiltrates        Total critical care time exclusive of procedures: 35 minutes  Hayley Sanchez MD

## 2018-01-11 NOTE — PROGRESS NOTES
Migue Michele        :  1960        MRN:  [id number]        Assessment :    Plan:  --DEISI    Hyperkalemia     Acute hypoxic and hypercarbic respiratory failure with evolving acute respiratory distress syndrome. Fluid overload with pulmonary edema, scrotal and peripheral edema. Bilateral pneumonia      Atrial fibrillation/flutter with rapid ventricular rate. Thrombocytopenia. Cardiomyopathy     Severe chronic obstructive pulmonary disease on home O2 prior to admission --ATN; cvvhd initiated on ; tachycardia (mild dependent edema, but albumin is quite low) - will stop UF for now - he will still be net negative since CRRT calculation doesn't account for insensible losses    continue bicarb gtt - bicarb slow to rise - could try to add some more alkali to dialysate bags if need be - will d/w pharmacy (chronic CO2 retention; needs higher bicarb to compensate)           Subjective:     Chief Complaint: Unable to obtain. On vent. On CVVHD (factor 50)      Review of Systems:    Symptom Y/N Comments  Symptom Y/N Comments   Fever/Chills    Chest Pain     Poor Appetite    Edema     Cough    Abdominal Pain     Sputum    Joint Pain     SOB/DOBSON    Pruritis/Rash     Nausea/vomit    Tolerating PT/OT     Diarrhea    Tolerating Diet     Constipation    Other       Could not obtain due to: See above     Objective:     VITALS:   Last 24hrs VS reviewed since prior progress note.  Most recent are:  Visit Vitals    /62    Pulse 89    Temp 96.6 °F (35.9 °C)    Resp 28    Ht 5' 7\" (1.702 m)    Wt 102.8 kg (226 lb 10.1 oz)    SpO2 (!) 83%    BMI 35.5 kg/m2       Intake/Output Summary (Last 24 hours) at 18 0680  Last data filed at 18 0600   Gross per 24 hour   Intake          5223.22 ml   Output             7770 ml   Net         -2546.78 ml      Telemetry Reviewed:     PHYSICAL EXAM:  General: Critically ill in the icu, on the vent  Rhonchi  +1 dependent edema  Soft, nt    Lab Data Reviewed: (see below)    Medications Reviewed: (see below)    PMH/SH reviewed - no change compared to H&P  ________________________________________________________________________  Care Plan discussed with:  Patient     Family      RN y    Care Manager                    Consultant:          Comments   >50% of visit spent in counseling and coordination of care       ________________________________________________________________________  Kingston Aragon MD     Procedures: see electronic medical records for all procedures/Xrays and details which  were not copied into this note but were reviewed prior to creation of Plan. LABS:  Recent Labs      01/11/18   0405  01/10/18   0425   WBC  24.2*  29.3*   HGB  12.7  14.0   HCT  39.8  43.5   PLT  83*  94*     Recent Labs      01/11/18   0405  01/10/18   1905  01/10/18   1153  01/10/18   0425   NA  136  137  137  137   K  3.8  4.2  4.4  4.6   CL  97  97  98  100   CO2  35*  33*  34*  30   BUN  49*  54*  55*  64*   CREA  1.90*  2.14*  2.17*  2.44*   GLU  278*  290*  187*  165*   CA  8.2*  8.3*  8.1*  8.2*   MG  2.0  2.0   --   2.1   PHOS  3.2  4.7  4.2  4.5     Recent Labs      01/11/18   0405  01/10/18   1905  01/10/18   1153  01/10/18   0425   01/09/18   0407   SGOT  40*   --    --   52*   --   56*   AP  114   --    --   88   --   70   TP  4.8*   --    --   5.3*   --   5.0*   ALB  1.3*  1.4*  1.4*  1.3*   < >  1.2*   GLOB  3.5   --    --   4.0   --   3.8    < > = values in this interval not displayed. Recent Labs      01/11/18   0405  01/10/18   1905  01/10/18   1150   APTT  61.6*  63.0*  59.2*      No results for input(s): FE, TIBC, PSAT, FERR in the last 72 hours. No results found for: FOL, RBCF   Recent Labs      01/11/18   0520  01/10/18   0558   PH  7.24*  7.23*   PCO2  82*  84*   PO2  50*  64*     No results for input(s): CPK, CKMB in the last 72 hours.     No lab exists for component: TROPONINI  No components found for: [place]  Lab Results   Component Value Date/Time    Color YELLOW/STRAW 01/07/2018 07:19 PM    Appearance TURBID 01/07/2018 07:19 PM    Specific gravity 1.021 01/07/2018 07:19 PM    pH (UA) 5.0 01/07/2018 07:19 PM    Protein 30 01/07/2018 07:19 PM    Glucose NEGATIVE  01/07/2018 07:19 PM    Ketone NEGATIVE  01/07/2018 07:19 PM    Bilirubin NEGATIVE  01/07/2018 07:19 PM    Urobilinogen 0.2 01/07/2018 07:19 PM    Nitrites NEGATIVE  01/07/2018 07:19 PM    Leukocyte Esterase SMALL 01/07/2018 07:19 PM    Epithelial cells FEW 01/07/2018 07:19 PM    Bacteria NEGATIVE  01/07/2018 07:19 PM    WBC 10-20 01/07/2018 07:19 PM    RBC 20-50 01/07/2018 07:19 PM       MEDICATIONS:  Current Facility-Administered Medications   Medication Dose Route Frequency    sodium bicarbonate (8.4%) 150 mEq in dextrose 5% 1,000 mL infusion   IntraVENous CONTINUOUS    insulin glargine (LANTUS) injection 5 Units  5 Units SubCUTAneous BID    albuterol-ipratropium (DUO-NEB) 2.5 MG-0.5 MG/3 ML  3 mL Nebulization Q6H PRN    dexmedeTOMidine (PRECEDEX) 400 mcg in 0.9% sodium chloride 100 mL infusion  0.2-1.4 mcg/kg/hr IntraVENous TITRATE    heparin 25,000 units in D5W 250 ml infusion  9.3-25 Units/kg/hr IntraVENous TITRATE    polyethylene glycol (MIRALAX) packet 17 g  17 g Per NG tube DAILY    heparin (porcine) injection 4,000 Units  4,000 Units IntraVENous Q6H PRN    heparin (porcine) injection 2,000 Units  2,000 Units IntraVENous Q6H PRN    bicarbonate dialysis (PRISMASOL) BG K 2/Ca 3.5 5000 ml solution   Extracorporeal DIALYSIS CONTINUOUS    famotidine (PF) (PEPCID) 20 mg in sodium chloride 0.9 % 10 mL injection  20 mg IntraVENous Q24H    methylPREDNISolone (PF) (SOLU-MEDROL) injection 40 mg  40 mg IntraVENous Q6H    metoclopramide HCl (REGLAN) injection 5 mg  5 mg IntraVENous Q6H    NOREPINephrine (LEVOPHED) 32 mg in 5% dextrose 250 mL infusion  2-30 mcg/min IntraVENous TITRATE    fentaNYL (PF) 900 mcg/30 ml infusion soln  0-200 mcg/hr IntraVENous TITRATE    zinc oxide-cod liver oil (DESITIN) 40 % paste   Topical BID    epoprostenol (VELETRI) 30,000 ng/mL in 0.9% sodium chloride 50 mL inhalation solution  50 ng/kg/min (Ideal) Inhalation CONTINUOUS    0.9% sodium chloride inhalation  22.4 mL/hr Inhalation CONTINUOUS    amiodarone (CORDARONE) 900 mg/250 ml D5W infusion  0.5 mg/min IntraVENous TITRATE    sodium chloride (NS) flush 10-30 mL  10-30 mL InterCATHeter PRN    sodium chloride (NS) flush 10-40 mL  10-40 mL InterCATHeter Q8H    sodium chloride (NS) flush 20 mL  20 mL InterCATHeter Q24H    heparin (porcine) pf 300 Units  300 Units InterCATHeter PRN    insulin lispro (HUMALOG) injection   SubCUTAneous Q6H    ondansetron (ZOFRAN) injection 4 mg  4 mg IntraVENous Q4H PRN    labetalol (NORMODYNE;TRANDATE) injection 10 mg  10 mg IntraVENous Q2H PRN    sodium chloride (NS) flush 5-10 mL  5-10 mL IntraVENous PRN    fentaNYL citrate (PF) injection  mcg   mcg IntraVENous Q2H PRN    chlorhexidine (PERIDEX) 0.12 % mouthwash 15 mL  15 mL Oral Q12H    metroNIDAZOLE (FLAGYL) IVPB premix 500 mg  500 mg IntraVENous Q12H    glucose chewable tablet 16 g  4 Tab Oral PRN    dextrose (D50W) injection syrg 12.5-25 g  12.5-25 g IntraVENous PRN    glucagon (GLUCAGEN) injection 1 mg  1 mg IntraMUSCular PRN

## 2018-01-12 NOTE — ROUTINE PROCESS
Quality: Chart reviewed for death and restraints. Restraints (bilateral soft wrist) noted 1/1/18-01/8/18. Restraints not a contributing factor in patient death. Documented for tracking according to CMS guidelines at 10:36 on 1/12/18.

## 2018-01-12 NOTE — DIALYSIS
[facility]       [phone]    Pt is . Machine cleaned and collected. Body cleaned with bleach wipes, screen cleaned with etoh wipes. Blood warmer locked to rear position and machine moved back to ICU storage room. Machine covered with white clean bag as per facility policy.     Signed: Marcial Regan RN

## 2018-01-12 NOTE — PROGRESS NOTES
Care Management:    Care Manager faxed discharge summary to [facility] per their request. [phone].     Thalia Mars [facility] [id number]

## 2018-02-05 LAB
BACTERIA SPEC CULT: NORMAL
SERVICE CMNT-IMP: NORMAL

## 2023-01-17 NOTE — PROGRESS NOTES
Hospitalist Progress Note  Lyly Delgado MD  Internal medicine/ Hospitalist    Daily Progress Note: 1/2/2018 5:45 PM      Interval history / Subjective:   Manfred Mariano is a 61 y.o.  male with known history as listed below presents to ED with complaint noted above. Patient with unclear PMH given no records in our system. Per review of ED records and discussion with ED MD given patient is unconscious and likely near intubation and no family/loved ones in room. Records from [address] being reviewed and in brief he is following pulmonary for his \"chronic\" white out of left lung since 2/2017. He has undergone multiple bronchoscopies and antibiotics for this without improvement  -I do not have results of these tests at this time. Girlfriend mentioned. Patient sedated and intubated.     Current Facility-Administered Medications   Medication Dose Route Frequency    sodium chloride (NS) flush 10-30 mL  10-30 mL InterCATHeter PRN    sodium chloride (NS) flush 10 mL  10 mL InterCATHeter Q24H    sodium chloride (NS) flush 10-40 mL  10-40 mL InterCATHeter Q8H    sodium chloride (NS) flush 20 mL  20 mL InterCATHeter Q24H    heparin (porcine) pf 300 Units  300 Units InterCATHeter PRN    [START ON 1/3/2018] VANCOMYCIN RANDOM LEVEL  1 Each Other ONCE    [START ON 1/3/2018] VANCOMYCIN INFORMATION NOTE   Other DAILY    [START ON 1/4/2018] levoFLOXacin (LEVAQUIN) 750 mg in D5W IVPB  750 mg IntraVENous Q48H    cefepime (MAXIPIME) 2 g in 0.9% sodium chloride (MBP/ADV) 100 mL  2 g IntraVENous Q12H    [START ON 1/3/2018] enoxaparin (LOVENOX) 90 mg  90 mg SubCUTAneous Q24H    [START ON 1/3/2018] famotidine (PF) (PEPCID) 20 mg in sodium chloride 0.9 % 10 mL injection  20 mg IntraVENous Q24H    hydrocortisone Sod Succ (PF) (SOLU-CORTEF) injection 50 mg  50 mg IntraVENous Q8H    And    insulin NPH (NOVOLIN N, HUMULIN N) injection 8 Units  8 Units SubCUTAneous Q8H    0.45% sodium chloride 1,000 mL with sodium bicarbonate (8. 4%) 618 mEq, folic acid 1 mg, thiamine 100 mg, mvi, adult no. 4 with vit K 10 mL, ascorbic acid (vitamin C) 500 mg infusion   IntraVENous CONTINUOUS    insulin lispro (HUMALOG) injection   SubCUTAneous Q6H    ondansetron (ZOFRAN) injection 4 mg  4 mg IntraVENous Q4H PRN    labetalol (NORMODYNE;TRANDATE) injection 10 mg  10 mg IntraVENous Q2H PRN    albuterol-ipratropium (DUO-NEB) 2.5 MG-0.5 MG/3 ML  3 mL Nebulization Q2H PRN    albuterol-ipratropium (DUO-NEB) 2.5 MG-0.5 MG/3 ML  3 mL Nebulization Q4H RT    sodium chloride (NS) flush 5-10 mL  5-10 mL IntraVENous Q8H    sodium chloride (NS) flush 5-10 mL  5-10 mL IntraVENous PRN    fluconazole (DIFLUCAN) 400mg/200 mL IVPB (premix)  400 mg IntraVENous DAILY    fentaNYL citrate (PF) injection  mcg   mcg IntraVENous Q2H PRN    LORazepam (ATIVAN) injection 1 mg  1 mg IntraVENous Q4H PRN    chlorhexidine (PERIDEX) 0.12 % mouthwash 15 mL  15 mL Oral Q12H    mupirocin calcium (BACTROBAN) 2 % cream   Topical BID    amiodarone (CORDARONE) 375 mg/250 mL D5W infusion  0.5 mg/min IntraVENous CONTINUOUS    metroNIDAZOLE (FLAGYL) IVPB premix 500 mg  500 mg IntraVENous Q12H    propofol (DIPRIVAN) infusion  0-50 mcg/kg/min IntraVENous TITRATE    NOREPINephrine (LEVOPHED) 8 mg in 5% dextrose 250mL infusion  2-30 mcg/min IntraVENous TITRATE    glucose chewable tablet 16 g  4 Tab Oral PRN    dextrose (D50W) injection syrg 12.5-25 g  12.5-25 g IntraVENous PRN    glucagon (GLUCAGEN) injection 1 mg  1 mg IntraMUSCular PRN        Objective:     Visit Vitals    /73    Pulse 100    Temp 97.7 °F (36.5 °C)    Resp 30    Ht 5' 7\" (1.702 m)    Wt 93.2 kg (205 lb 7.5 oz)    SpO2 90%    BMI 32.18 kg/m2    O2 Flow Rate (L/min): 15 l/min O2 Device: Endotracheal tube, Ventilator    Temp (24hrs), Av.5 °F (37.5 °C), Min:97.7 °F (36.5 °C), Max:100.5 °F (38.1 °C)      701 - 1900  In: 2269.9 [I.V.:2269.9]  Out: 570 [Urine:520]  1901 - 01/02 0700  In: 4569.2 [I.V.:4509.2]  Out: 1370 [Urine:920]   P/E  Intubated and sedated. Heent:perrla,at/nc,mouth dry,ET tube present. Neck:supple,no jvd  Lungs:rales both lungs  Heart: tach,s1s2 nl,no m/g  Abdm:soft,not tender,bs present. Extr:no edema,good pedis pulses. Neuro:intubated,sedated,unresponsive.     Data Review    Recent Results (from the past 12 hour(s))   GLUCOSE, POC    Collection Time: 01/02/18  6:31 AM   Result Value Ref Range    Glucose (POC) 308 (H) 65 - 100 mg/dL    Performed by Shana Ann, ARTERIAL    Collection Time: 01/02/18  6:32 AM   Result Value Ref Range    pH 7.25 (L) 7.35 - 7.45      PCO2 71 (H) 35.0 - 45.0 mmHg    PO2 59 (L) 80 - 100 mmHg    O2 SAT 85 (L) 92 - 97 %    BICARBONATE 31 (H) 22 - 26 mmol/L    BASE EXCESS 1.0 mmol/L    O2 METHOD VENTILATOR      FIO2 100 %    MODE PRESSURE CONTROL      SET RATE 30      SPONTANEOUS RATE 30.0      IPAP/PIP 26      EPAP/CPAP/PEEP 6.0      Sample source ARTERIAL      SITE LEFT BRACHIAL      SYBIL'S TEST YES      Critical value read back TERRY DING RN    GLUCOSE, POC    Collection Time: 01/02/18 11:38 AM   Result Value Ref Range    Glucose (POC) 298 (H) 65 - 100 mg/dL    Performed by Raguel Bamberger    ECG RHYTHM ANALYSIS ADULT    Collection Time: 01/02/18  1:46 PM   Result Value Ref Range    Ventricular Rate 96 BPM    Atrial Rate 96 BPM    P-R Interval 148 ms    QRS Duration 122 ms    Q-T Interval 366 ms    QTC Calculation (Bezet) 462 ms    Calculated P Axis 51 degrees    Calculated R Axis 113 degrees    Calculated T Axis -78 degrees    Diagnosis       Sinus rhythm with premature atrial complexes  Right bundle branch block  Lateral infarct (cited on or before 01-JAN-2018)  T wave abnormality, consider inferior ischemia  Abnormal ECG  When compared with ECG of 01-JAN-2018 11:04,  Sinus rhythm has replaced Atrial flutter  Right bundle branch block has replaced Nonspecific intraventricular block  Confirmed by Deanna Kurtz ([id number]) on 1/2/2018 5:34:52 PM     GLUCOSE, POC    Collection Time: 01/02/18  5:26 PM   Result Value Ref Range    Glucose (POC) 247 (H) 65 - 100 mg/dL    Performed by Roberta Her          Assessment/Plan:     Principal Problem:    Acute respiratory failure ([address] (1/1/2018)    Active Problems:    Acute encephalopathy (1/1/2018)      Typical atrial flutter ([address] (1/1/2018)      Acute on chronic systolic HF (heart failure) ([address] (1/2/2018)      Acute renal insufficiency (1/2/2018)      SOB (shortness of breath) (1/2/2018)      Abdominal distention (1/2/2018)      Care Plan   acute hypoxic, hypercarbic respiratory failure suspect acute on chronic causing Acute encephalopathy  Atrial fibrillation with rapid response  Sepsis due to Possible bilateral pneumonia  Hypotension post intubation  Chronic steroid use, possible adrenal insufficiency  Chronic left lung white out of unclear origin  -steroids to cover both possible adrenal insufficiency and respiratory exacerbation  -nebs prn and scheduled  -remains intubated with vent management per pulmonary  -unclear etiolgoy of chronic left lung white out - may need to discuss with pulmonary at [facility] for further insight and details as to what their next steps were to be and see if we can help further  -not stable for transfer at this time to [facility] which were his wishes  -will empirically treat for HAP given recent [facility] IP stay - cefepime, lq, vanco.   -will continue on diflucan as well given chronic steroids and lung condition  -tailor abx regimen based on culture results  -abg as needed  -lactic acid wnl  -CXR on 1/2/2018:PICC line in satisfactory position. No pneumothorax  -Had cardioversion today,now in suns and on amiodarone drip  -On levophed  -Will continue to follow cardiology and pulm recommendations.     Abdominal distension  -post NGT placement with copious output  -KUB on 1/1/2018 c/w Gastric dilatation. Bilateral airspace disease visualized lung bases.  -etiology as to why distension occurred unclear - will follow KUB and discuss with intensivist     Suspect coronary artery disease  -had stress test at [facility] results pending -      Possible DM2  -ssi + poc bs for now  -may need coverage scheduled while on steroids     We do not have medications on record and therefore suspect we will need to augment once records become available.     Patient remains acutely/critically ill with elevated risk for furhter decompensation.  To ICU.      I have personally reviewed the radiographs, laboratory data in Epic and decisions and statements above are based partially on this personal interpretation.     Code Status: Full Code  DVT Prophylaxis: Hep SQ  used

## 2023-08-26 NOTE — INTERDISCIPLINARY ROUNDS
Interdisciplinary team rounds were held 1/10/18 with the following team members:Care Management, Diabetes Treatment Specialist, Nursing, Nutrition, Pharmacy, Physical Therapy, Physician, Respiratory Therapy and Clinical Coordinator. Plan of care discussed. Goal: See MD orders and progress notes for further  interventions and desired outcomes. Magnesium 0.7